# Patient Record
Sex: FEMALE | Race: WHITE | NOT HISPANIC OR LATINO | URBAN - METROPOLITAN AREA
[De-identification: names, ages, dates, MRNs, and addresses within clinical notes are randomized per-mention and may not be internally consistent; named-entity substitution may affect disease eponyms.]

---

## 2018-01-14 ENCOUNTER — EMERGENCY (EMERGENCY)
Facility: HOSPITAL | Age: 83
LOS: 1 days | Discharge: HOME | End: 2018-01-14
Attending: EMERGENCY MEDICINE | Admitting: EMERGENCY MEDICINE
Payer: MEDICARE

## 2018-01-14 VITALS
HEART RATE: 84 BPM | OXYGEN SATURATION: 97 % | TEMPERATURE: 98 F | DIASTOLIC BLOOD PRESSURE: 78 MMHG | RESPIRATION RATE: 19 BRPM | SYSTOLIC BLOOD PRESSURE: 175 MMHG

## 2018-01-14 VITALS
RESPIRATION RATE: 16 BRPM | DIASTOLIC BLOOD PRESSURE: 62 MMHG | SYSTOLIC BLOOD PRESSURE: 164 MMHG | HEART RATE: 78 BPM | OXYGEN SATURATION: 100 % | TEMPERATURE: 98 F

## 2018-01-14 DIAGNOSIS — Z90.49 ACQUIRED ABSENCE OF OTHER SPECIFIED PARTS OF DIGESTIVE TRACT: Chronic | ICD-10-CM

## 2018-01-14 LAB
ALBUMIN SERPL ELPH-MCNC: 4.1 G/DL — SIGNIFICANT CHANGE UP (ref 3.3–5)
ALP SERPL-CCNC: 73 U/L — SIGNIFICANT CHANGE UP (ref 40–120)
ALT FLD-CCNC: 23 U/L — SIGNIFICANT CHANGE UP (ref 4–33)
APPEARANCE UR: CLEAR — SIGNIFICANT CHANGE UP
AST SERPL-CCNC: 25 U/L — SIGNIFICANT CHANGE UP (ref 4–32)
BACTERIA # UR AUTO: SIGNIFICANT CHANGE UP
BASE EXCESS BLDV CALC-SCNC: 3.9 MMOL/L — SIGNIFICANT CHANGE UP
BASOPHILS # BLD AUTO: 0.04 K/UL — SIGNIFICANT CHANGE UP (ref 0–0.2)
BASOPHILS NFR BLD AUTO: 0.5 % — SIGNIFICANT CHANGE UP (ref 0–2)
BILIRUB SERPL-MCNC: 0.5 MG/DL — SIGNIFICANT CHANGE UP (ref 0.2–1.2)
BILIRUB UR-MCNC: NEGATIVE — SIGNIFICANT CHANGE UP
BLOOD GAS VENOUS - CREATININE: 0.64 MG/DL — SIGNIFICANT CHANGE UP (ref 0.5–1.3)
BLOOD UR QL VISUAL: NEGATIVE — SIGNIFICANT CHANGE UP
BUN SERPL-MCNC: 14 MG/DL — SIGNIFICANT CHANGE UP (ref 7–23)
CALCIUM SERPL-MCNC: 8.9 MG/DL — SIGNIFICANT CHANGE UP (ref 8.4–10.5)
CHLORIDE BLDV-SCNC: 97 MMOL/L — SIGNIFICANT CHANGE UP (ref 96–108)
CHLORIDE SERPL-SCNC: 95 MMOL/L — LOW (ref 98–107)
CK MB BLD-MCNC: 2.56 NG/ML — SIGNIFICANT CHANGE UP (ref 1–4.7)
CK MB BLD-MCNC: SIGNIFICANT CHANGE UP (ref 0–2.5)
CK SERPL-CCNC: 67 U/L — SIGNIFICANT CHANGE UP (ref 25–170)
CO2 SERPL-SCNC: 26 MMOL/L — SIGNIFICANT CHANGE UP (ref 22–31)
COLOR SPEC: SIGNIFICANT CHANGE UP
CREAT SERPL-MCNC: 0.66 MG/DL — SIGNIFICANT CHANGE UP (ref 0.5–1.3)
EOSINOPHIL # BLD AUTO: 0.09 K/UL — SIGNIFICANT CHANGE UP (ref 0–0.5)
EOSINOPHIL NFR BLD AUTO: 1.1 % — SIGNIFICANT CHANGE UP (ref 0–6)
GAS PNL BLDV: 128 MMOL/L — LOW (ref 136–146)
GLUCOSE BLDV-MCNC: 121 — HIGH (ref 70–99)
GLUCOSE SERPL-MCNC: 118 MG/DL — HIGH (ref 70–99)
GLUCOSE UR-MCNC: NEGATIVE — SIGNIFICANT CHANGE UP
HCO3 BLDV-SCNC: 27 MMOL/L — SIGNIFICANT CHANGE UP (ref 20–27)
HCT VFR BLD CALC: 37.2 % — SIGNIFICANT CHANGE UP (ref 34.5–45)
HCT VFR BLDV CALC: 41.1 % — SIGNIFICANT CHANGE UP (ref 34.5–45)
HGB BLD-MCNC: 13.1 G/DL — SIGNIFICANT CHANGE UP (ref 11.5–15.5)
HGB BLDV-MCNC: 13.4 G/DL — SIGNIFICANT CHANGE UP (ref 11.5–15.5)
IMM GRANULOCYTES # BLD AUTO: 0.04 # — SIGNIFICANT CHANGE UP
IMM GRANULOCYTES NFR BLD AUTO: 0.5 % — SIGNIFICANT CHANGE UP (ref 0–1.5)
KETONES UR-MCNC: NEGATIVE — SIGNIFICANT CHANGE UP
LACTATE BLDV-MCNC: 1 MMOL/L — SIGNIFICANT CHANGE UP (ref 0.5–2)
LEUKOCYTE ESTERASE UR-ACNC: HIGH
LYMPHOCYTES # BLD AUTO: 1.78 K/UL — SIGNIFICANT CHANGE UP (ref 1–3.3)
LYMPHOCYTES # BLD AUTO: 21.9 % — SIGNIFICANT CHANGE UP (ref 13–44)
MCHC RBC-ENTMCNC: 31 PG — SIGNIFICANT CHANGE UP (ref 27–34)
MCHC RBC-ENTMCNC: 35.2 % — SIGNIFICANT CHANGE UP (ref 32–36)
MCV RBC AUTO: 87.9 FL — SIGNIFICANT CHANGE UP (ref 80–100)
MONOCYTES # BLD AUTO: 0.57 K/UL — SIGNIFICANT CHANGE UP (ref 0–0.9)
MONOCYTES NFR BLD AUTO: 7 % — SIGNIFICANT CHANGE UP (ref 2–14)
NEUTROPHILS # BLD AUTO: 5.62 K/UL — SIGNIFICANT CHANGE UP (ref 1.8–7.4)
NEUTROPHILS NFR BLD AUTO: 69 % — SIGNIFICANT CHANGE UP (ref 43–77)
NITRITE UR-MCNC: NEGATIVE — SIGNIFICANT CHANGE UP
NRBC # FLD: 0 — SIGNIFICANT CHANGE UP
PCO2 BLDV: 46 MMHG — SIGNIFICANT CHANGE UP (ref 41–51)
PH BLDV: 7.41 PH — SIGNIFICANT CHANGE UP (ref 7.32–7.43)
PH UR: 7 — SIGNIFICANT CHANGE UP (ref 4.6–8)
PLATELET # BLD AUTO: 315 K/UL — SIGNIFICANT CHANGE UP (ref 150–400)
PMV BLD: 8.7 FL — SIGNIFICANT CHANGE UP (ref 7–13)
PO2 BLDV: 54 MMHG — HIGH (ref 35–40)
POTASSIUM BLDV-SCNC: 3.7 MMOL/L — SIGNIFICANT CHANGE UP (ref 3.4–4.5)
POTASSIUM SERPL-MCNC: 4.1 MMOL/L — SIGNIFICANT CHANGE UP (ref 3.5–5.3)
POTASSIUM SERPL-SCNC: 4.1 MMOL/L — SIGNIFICANT CHANGE UP (ref 3.5–5.3)
PROT SERPL-MCNC: 7 G/DL — SIGNIFICANT CHANGE UP (ref 6–8.3)
PROT UR-MCNC: NEGATIVE MG/DL — SIGNIFICANT CHANGE UP
RBC # BLD: 4.23 M/UL — SIGNIFICANT CHANGE UP (ref 3.8–5.2)
RBC # FLD: 12.4 % — SIGNIFICANT CHANGE UP (ref 10.3–14.5)
RBC CASTS # UR COMP ASSIST: SIGNIFICANT CHANGE UP (ref 0–?)
SAO2 % BLDV: 87 % — HIGH (ref 60–85)
SODIUM SERPL-SCNC: 135 MMOL/L — SIGNIFICANT CHANGE UP (ref 135–145)
SP GR SPEC: 1 — LOW (ref 1–1.04)
SQUAMOUS # UR AUTO: SIGNIFICANT CHANGE UP
TROPONIN T SERPL-MCNC: < 0.06 NG/ML — SIGNIFICANT CHANGE UP (ref 0–0.06)
UROBILINOGEN FLD QL: NORMAL MG/DL — SIGNIFICANT CHANGE UP
WBC # BLD: 8.14 K/UL — SIGNIFICANT CHANGE UP (ref 3.8–10.5)
WBC # FLD AUTO: 8.14 K/UL — SIGNIFICANT CHANGE UP (ref 3.8–10.5)
WBC UR QL: SIGNIFICANT CHANGE UP (ref 0–?)

## 2018-01-14 PROCEDURE — 71046 X-RAY EXAM CHEST 2 VIEWS: CPT | Mod: 26

## 2018-01-14 PROCEDURE — 99285 EMERGENCY DEPT VISIT HI MDM: CPT | Mod: GC

## 2018-01-14 RX ORDER — SODIUM CHLORIDE 9 MG/ML
500 INJECTION INTRAMUSCULAR; INTRAVENOUS; SUBCUTANEOUS ONCE
Qty: 0 | Refills: 0 | Status: COMPLETED | OUTPATIENT
Start: 2018-01-14 | End: 2018-01-14

## 2018-01-14 RX ORDER — SODIUM CHLORIDE 9 MG/ML
1000 INJECTION INTRAMUSCULAR; INTRAVENOUS; SUBCUTANEOUS ONCE
Qty: 0 | Refills: 0 | Status: COMPLETED | OUTPATIENT
Start: 2018-01-14 | End: 2018-01-14

## 2018-01-14 RX ADMIN — SODIUM CHLORIDE 1000 MILLILITER(S): 9 INJECTION INTRAMUSCULAR; INTRAVENOUS; SUBCUTANEOUS at 18:49

## 2018-01-14 RX ADMIN — SODIUM CHLORIDE 500 MILLILITER(S): 9 INJECTION INTRAMUSCULAR; INTRAVENOUS; SUBCUTANEOUS at 17:25

## 2018-01-14 NOTE — ED PROVIDER NOTE - CARE PLAN
Instructions for follow-up, activity and diet:	Follow-up with your Primary Care Physician within 24-48 hours.  Please return to the Emergency Department immediately for any new, worsening or concerning symptoms; specifically those included in the attached information brochure. Principal Discharge DX:	URI (upper respiratory infection)  Instructions for follow-up, activity and diet:	Follow-up with your Primary Care Physician within 24-48 hours.  Please return to the Emergency Department immediately for any new, worsening or concerning symptoms; specifically those included in the attached information brochure. Principal Discharge DX:	URI (upper respiratory infection)  Assessment and plan of treatment:	Follow-up with your Primary Care Physician within 24-48 hours.  Please return to the Emergency Department immediately for any new, worsening or concerning symptoms; specifically those included in the attached information brochure.

## 2018-01-14 NOTE — ED ADULT NURSE NOTE - OBJECTIVE STATEMENT
81 y/o female presents to ED with c/o URI symptoms.  Pt states that she has had nasal congestion, non productive cough and generalized weakness since Tuesday.  Pt states that she went to see her PMD on Thursday and was given antibiotics, not feeling better.  Pt states that she feels weaker today.  Pt denies fever or chills, no n/v/d, denies SOB, no CP, no abd pain.  Pt states that she has been taking PO fluids, has not taken any Tylenol or Motrin.  Pt awake A&Ox3, ambulatory with steady gait, IV established, labs drawn and sent, providers evaluating.

## 2018-01-14 NOTE — ED PROVIDER NOTE - OBJECTIVE STATEMENT
82 year-old female with history of Hypertension presents to the Emergency Department for weakness x 1 day.  Patient mentions that he has been having URI symptoms of cough, sore throat and sinus congestion ongoing for the past 6 days.  Reports generalized weakness going on since this morning since AM.  No fevers, chills, nausea, vomiting.  Has SOB ongoing during this time as well - exacerbated with exertion.  On/off CP for years - not worsened lately - got a echo last year which did not show acute abnormalities.  Saw her PCP 3 days ago and had a CXR done - results not back yet - and given Levofloxacin.

## 2018-01-14 NOTE — ED PROVIDER NOTE - ATTENDING CONTRIBUTION TO CARE
romel: URI symptoms including cough, sore throat and sinus congestion approx one week. rx levoflox by PCP several days ago. CXR done, results?? No in addition c/o weakness.   exam unremarkable.   Impression: need to consider infection including viral. CXR nl .UA 10-25 wbcs. hydrated while in ED. Pt felt well enough to go home and recc opt f/u.   Continue levo for ua fidnings (while no sx of UTI)

## 2018-01-14 NOTE — ED PROVIDER NOTE - MEDICAL DECISION MAKING DETAILS
82 year-old female with history of Hypertension presents to the Emergency Department for weakness x 1 day ~ likely dehydration and malaise from recent illness.  Viral vs PNA.  Plan to do a CXR and EKG.  Basic labs with CBC, CMP.  Ongoing CP for years - not changing - unlikely to be due to ACS; plan to do troponin.

## 2018-01-15 ENCOUNTER — EMERGENCY (EMERGENCY)
Facility: HOSPITAL | Age: 83
LOS: 1 days | Discharge: HOME | End: 2018-01-15
Attending: EMERGENCY MEDICINE | Admitting: EMERGENCY MEDICINE
Payer: MEDICARE

## 2018-01-15 VITALS
DIASTOLIC BLOOD PRESSURE: 81 MMHG | TEMPERATURE: 98 F | SYSTOLIC BLOOD PRESSURE: 173 MMHG | HEART RATE: 87 BPM | RESPIRATION RATE: 20 BRPM | OXYGEN SATURATION: 99 %

## 2018-01-15 VITALS
HEART RATE: 75 BPM | RESPIRATION RATE: 16 BRPM | DIASTOLIC BLOOD PRESSURE: 77 MMHG | SYSTOLIC BLOOD PRESSURE: 134 MMHG | TEMPERATURE: 98 F | OXYGEN SATURATION: 99 %

## 2018-01-15 DIAGNOSIS — Z90.49 ACQUIRED ABSENCE OF OTHER SPECIFIED PARTS OF DIGESTIVE TRACT: Chronic | ICD-10-CM

## 2018-01-15 LAB
ALBUMIN SERPL ELPH-MCNC: 3.8 G/DL — SIGNIFICANT CHANGE UP (ref 3.3–5)
ALP SERPL-CCNC: 73 U/L — SIGNIFICANT CHANGE UP (ref 40–120)
ALT FLD-CCNC: 25 U/L — SIGNIFICANT CHANGE UP (ref 4–33)
APPEARANCE UR: CLEAR — SIGNIFICANT CHANGE UP
APTT BLD: 27.4 SEC — LOW (ref 27.5–37.4)
AST SERPL-CCNC: 26 U/L — SIGNIFICANT CHANGE UP (ref 4–32)
BASE EXCESS BLDV CALC-SCNC: 1.9 MMOL/L — SIGNIFICANT CHANGE UP
BILIRUB SERPL-MCNC: 0.7 MG/DL — SIGNIFICANT CHANGE UP (ref 0.2–1.2)
BILIRUB UR-MCNC: NEGATIVE — SIGNIFICANT CHANGE UP
BLOOD GAS VENOUS - CREATININE: 0.57 MG/DL — SIGNIFICANT CHANGE UP (ref 0.5–1.3)
BLOOD UR QL VISUAL: NEGATIVE — SIGNIFICANT CHANGE UP
BUN SERPL-MCNC: 7 MG/DL — SIGNIFICANT CHANGE UP (ref 7–23)
BUN SERPL-MCNC: 9 MG/DL — SIGNIFICANT CHANGE UP (ref 7–23)
CALCIUM SERPL-MCNC: 8 MG/DL — LOW (ref 8.4–10.5)
CALCIUM SERPL-MCNC: 8.6 MG/DL — SIGNIFICANT CHANGE UP (ref 8.4–10.5)
CHLORIDE BLDV-SCNC: 92 MMOL/L — LOW (ref 96–108)
CHLORIDE SERPL-SCNC: 104 MMOL/L — SIGNIFICANT CHANGE UP (ref 98–107)
CHLORIDE SERPL-SCNC: 89 MMOL/L — LOW (ref 98–107)
CO2 SERPL-SCNC: 22 MMOL/L — SIGNIFICANT CHANGE UP (ref 22–31)
CO2 SERPL-SCNC: 24 MMOL/L — SIGNIFICANT CHANGE UP (ref 22–31)
COLOR SPEC: SIGNIFICANT CHANGE UP
CREAT SERPL-MCNC: 0.54 MG/DL — SIGNIFICANT CHANGE UP (ref 0.5–1.3)
CREAT SERPL-MCNC: 0.6 MG/DL — SIGNIFICANT CHANGE UP (ref 0.5–1.3)
GAS PNL BLDV: 121 MMOL/L — LOW (ref 136–146)
GLUCOSE BLDV-MCNC: 105 — HIGH (ref 70–99)
GLUCOSE SERPL-MCNC: 105 MG/DL — HIGH (ref 70–99)
GLUCOSE SERPL-MCNC: 113 MG/DL — HIGH (ref 70–99)
GLUCOSE UR-MCNC: NEGATIVE — SIGNIFICANT CHANGE UP
HCO3 BLDV-SCNC: 26 MMOL/L — SIGNIFICANT CHANGE UP (ref 20–27)
HCT VFR BLD CALC: 35.9 % — SIGNIFICANT CHANGE UP (ref 34.5–45)
HCT VFR BLDV CALC: 40.2 % — SIGNIFICANT CHANGE UP (ref 34.5–45)
HGB BLD-MCNC: 13.2 G/DL — SIGNIFICANT CHANGE UP (ref 11.5–15.5)
HGB BLDV-MCNC: 13.1 G/DL — SIGNIFICANT CHANGE UP (ref 11.5–15.5)
INR BLD: 1.03 — SIGNIFICANT CHANGE UP (ref 0.88–1.17)
KETONES UR-MCNC: NEGATIVE — SIGNIFICANT CHANGE UP
LACTATE BLDV-MCNC: 0.9 MMOL/L — SIGNIFICANT CHANGE UP (ref 0.5–2)
LEUKOCYTE ESTERASE UR-ACNC: NEGATIVE — SIGNIFICANT CHANGE UP
MCHC RBC-ENTMCNC: 32 PG — SIGNIFICANT CHANGE UP (ref 27–34)
MCHC RBC-ENTMCNC: 36.8 % — HIGH (ref 32–36)
MCV RBC AUTO: 86.9 FL — SIGNIFICANT CHANGE UP (ref 80–100)
NITRITE UR-MCNC: NEGATIVE — SIGNIFICANT CHANGE UP
NRBC # FLD: 0 — SIGNIFICANT CHANGE UP
PCO2 BLDV: 41 MMHG — SIGNIFICANT CHANGE UP (ref 41–51)
PH BLDV: 7.42 PH — SIGNIFICANT CHANGE UP (ref 7.32–7.43)
PH UR: 7 — SIGNIFICANT CHANGE UP (ref 4.6–8)
PLATELET # BLD AUTO: 283 K/UL — SIGNIFICANT CHANGE UP (ref 150–400)
PMV BLD: 8.5 FL — SIGNIFICANT CHANGE UP (ref 7–13)
PO2 BLDV: 45 MMHG — HIGH (ref 35–40)
POTASSIUM BLDV-SCNC: 3.3 MMOL/L — LOW (ref 3.4–4.5)
POTASSIUM SERPL-MCNC: 3.6 MMOL/L — SIGNIFICANT CHANGE UP (ref 3.5–5.3)
POTASSIUM SERPL-MCNC: 3.8 MMOL/L — SIGNIFICANT CHANGE UP (ref 3.5–5.3)
POTASSIUM SERPL-SCNC: 3.6 MMOL/L — SIGNIFICANT CHANGE UP (ref 3.5–5.3)
POTASSIUM SERPL-SCNC: 3.8 MMOL/L — SIGNIFICANT CHANGE UP (ref 3.5–5.3)
PROT SERPL-MCNC: 6.8 G/DL — SIGNIFICANT CHANGE UP (ref 6–8.3)
PROT UR-MCNC: NEGATIVE MG/DL — SIGNIFICANT CHANGE UP
PROTHROM AB SERPL-ACNC: 11.9 SEC — SIGNIFICANT CHANGE UP (ref 9.8–13.1)
RBC # BLD: 4.13 M/UL — SIGNIFICANT CHANGE UP (ref 3.8–5.2)
RBC # FLD: 12.1 % — SIGNIFICANT CHANGE UP (ref 10.3–14.5)
RBC CASTS # UR COMP ASSIST: SIGNIFICANT CHANGE UP (ref 0–?)
SAO2 % BLDV: 81.3 % — SIGNIFICANT CHANGE UP (ref 60–85)
SODIUM SERPL-SCNC: 126 MMOL/L — LOW (ref 135–145)
SODIUM SERPL-SCNC: 140 MMOL/L — SIGNIFICANT CHANGE UP (ref 135–145)
SP GR SPEC: 1 — LOW (ref 1–1.04)
SQUAMOUS # UR AUTO: SIGNIFICANT CHANGE UP
UROBILINOGEN FLD QL: NORMAL MG/DL — SIGNIFICANT CHANGE UP
WBC # BLD: 9.89 K/UL — SIGNIFICANT CHANGE UP (ref 3.8–10.5)
WBC # FLD AUTO: 9.89 K/UL — SIGNIFICANT CHANGE UP (ref 3.8–10.5)
WBC UR QL: SIGNIFICANT CHANGE UP (ref 0–?)

## 2018-01-15 PROCEDURE — 99285 EMERGENCY DEPT VISIT HI MDM: CPT | Mod: GC

## 2018-01-15 PROCEDURE — 70450 CT HEAD/BRAIN W/O DYE: CPT | Mod: 26

## 2018-01-15 RX ORDER — SODIUM CHLORIDE 9 MG/ML
1000 INJECTION INTRAMUSCULAR; INTRAVENOUS; SUBCUTANEOUS ONCE
Qty: 0 | Refills: 0 | Status: COMPLETED | OUTPATIENT
Start: 2018-01-15 | End: 2018-01-15

## 2018-01-15 RX ORDER — CEFTRIAXONE 500 MG/1
1 INJECTION, POWDER, FOR SOLUTION INTRAMUSCULAR; INTRAVENOUS ONCE
Qty: 0 | Refills: 0 | Status: COMPLETED | OUTPATIENT
Start: 2018-01-15 | End: 2018-01-15

## 2018-01-15 RX ORDER — CEPHALEXIN 500 MG
1 CAPSULE ORAL
Qty: 14 | Refills: 0
Start: 2018-01-15 | End: 2018-01-21

## 2018-01-15 RX ORDER — MECLIZINE HCL 12.5 MG
25 TABLET ORAL ONCE
Qty: 0 | Refills: 0 | Status: COMPLETED | OUTPATIENT
Start: 2018-01-15 | End: 2018-01-15

## 2018-01-15 RX ADMIN — CEFTRIAXONE 100 GRAM(S): 500 INJECTION, POWDER, FOR SOLUTION INTRAMUSCULAR; INTRAVENOUS at 14:11

## 2018-01-15 RX ADMIN — Medication 25 MILLIGRAM(S): at 12:37

## 2018-01-15 RX ADMIN — SODIUM CHLORIDE 1000 MILLILITER(S): 9 INJECTION INTRAMUSCULAR; INTRAVENOUS; SUBCUTANEOUS at 12:32

## 2018-01-15 RX ADMIN — SODIUM CHLORIDE 1000 MILLILITER(S): 9 INJECTION INTRAMUSCULAR; INTRAVENOUS; SUBCUTANEOUS at 16:30

## 2018-01-15 NOTE — ED PROVIDER NOTE - ATTENDING CONTRIBUTION TO CARE
Chelly: 81 yo female seen yesterday in the ED for URI symptoms. Pt had a UTI but had already been on levaquin and was d/c home after IV hydration. Pt initially felt better but this morning woke up again c/o generalized weakness and was brought back  to the hospital by her son. Pt denies fevers, chills, abdominal pain, vomiting, chest pain and SOB. Pt c/o headache and unsteady gait. Exam: CNII-XII intact, AxOx4, 5/5 strength x 4 extremities. No dysmetria or disdiadochokinesia, no ataxia. pt mabulated multiple times to the bathroom observed by me without assistance with normal gait. cardiac, lung, and abdominal exam unremarkable. MMM, pink conjunctiva. A/P- 81 yo female with UTI/URI and headache. will obtain labs, u/a, cxr and ct head. will give iv hydration and reassess.

## 2018-01-15 NOTE — ED ADULT NURSE NOTE - OBJECTIVE STATEMENT
p/t is 82y old female,  received awake and responsive c/o of mild headaches, dizziness, and weakness for past few days, p/t ambulatory, denies any chest pain denies sob,  nad noted

## 2018-01-15 NOTE — ED ADULT TRIAGE NOTE - CHIEF COMPLAINT QUOTE
pt returning for persistent eladio. ring in ears and dizziness with HA and interm.  blur vision started this AM, denies weakness to upper and lower extremities pt AOX 3 no facial droop.

## 2018-01-15 NOTE — ED PROVIDER NOTE - SHIFT CHANGE DETAILS
I have signed over this patient to the above attending physician. Pertinent history, physical exam findings and workup thus far in the ED have been discussed. The pending tests and plan, including Ct head were signed over.  All questions from the above attending physician have been answered.

## 2018-01-15 NOTE — ED PROVIDER NOTE - MEDICAL DECISION MAKING DETAILS
81yo female with generalized weakness in setting of URI, now with dizziness/room spinning and ringing in ears, likely peripheral vertigo related to congestion, normal neuro exam (will assess gait after fluids), will hydrate, meclizine, CT head, reassess, if unable to walk will have neuro eval and admit 83yo female with generalized weakness in setting of URI, now with dizziness/room spinning and ringing in ears, likely peripheral vertigo related to congestion, normal neuro exam, ambulating here without difficulty, will hydrate, meclizine, CT head, reassess, Also with UTI on UA yesterday and still with urinary frequency while on levaquin, will switch to ceftriaxone

## 2018-01-15 NOTE — ED PROVIDER NOTE - OBJECTIVE STATEMENT
81yo female h/o htn and chronic bronchitis, seen in ED yesterday for generalized weakness in setting of URI currently on levaquin. Pt was evaluated in ED yesterday, neg chest xray and labs wnl, improved after fluids, now returns for worsening generalized weakness as well as dizziness and unsteady gait. Pt feels both weakness and room spinning when trying to walk. Also had headache and ear ringing earlier in day. Currently no symptoms at rest. No extremity weakness. Pt had episode of seeing dark but since resolved and no current vision changes. Pt has chronic cough, no worsening, no fevers, no chills. + chronic decreased appetitite, no nausea, no vomiting.

## 2018-01-15 NOTE — ED PROVIDER NOTE - PROGRESS NOTE DETAILS
[place]: Pt ambulating back and forth to bathroom without difficulty. Treated with ceftriaxone for possisble UTI from yesterdays positive urine and urinary frequency despite negative UA today as pt was on levaquin. Awaiting CT head. [place]: pt improved, awaiting Ct head read, if negative will dc with keflex and close pmd follow up RPT BMP improved, pt eating, ambulating, feels well for dc

## 2018-01-15 NOTE — ED PROVIDER NOTE - ENMT, MLM
Airway patent, Nasal mucosa clear. Mouth with DRY mucosa. Throat has no vesicles, no oropharyngeal exudates and uvula is midline. Left TM normal, right TM with cerumen

## 2018-01-16 LAB — SPECIMEN SOURCE: SIGNIFICANT CHANGE UP

## 2018-01-17 LAB — BACTERIA UR CULT: SIGNIFICANT CHANGE UP

## 2018-07-24 ENCOUNTER — EMERGENCY (EMERGENCY)
Facility: HOSPITAL | Age: 83
LOS: 1 days | Discharge: HOME | End: 2018-07-24
Attending: EMERGENCY MEDICINE | Admitting: EMERGENCY MEDICINE
Payer: MEDICARE

## 2018-07-24 VITALS
HEART RATE: 80 BPM | OXYGEN SATURATION: 99 % | TEMPERATURE: 98 F | DIASTOLIC BLOOD PRESSURE: 90 MMHG | SYSTOLIC BLOOD PRESSURE: 197 MMHG | RESPIRATION RATE: 16 BRPM

## 2018-07-24 DIAGNOSIS — Z90.49 ACQUIRED ABSENCE OF OTHER SPECIFIED PARTS OF DIGESTIVE TRACT: Chronic | ICD-10-CM

## 2018-07-24 LAB
ALBUMIN SERPL ELPH-MCNC: 4.1 G/DL — SIGNIFICANT CHANGE UP (ref 3.3–5)
ALP SERPL-CCNC: 84 U/L — SIGNIFICANT CHANGE UP (ref 40–120)
ALT FLD-CCNC: 29 U/L — SIGNIFICANT CHANGE UP (ref 4–33)
APPEARANCE UR: CLEAR — SIGNIFICANT CHANGE UP
APTT BLD: 28.9 SEC — SIGNIFICANT CHANGE UP (ref 27.5–37.4)
AST SERPL-CCNC: 40 U/L — HIGH (ref 4–32)
BASOPHILS # BLD AUTO: 0.04 K/UL — SIGNIFICANT CHANGE UP (ref 0–0.2)
BASOPHILS NFR BLD AUTO: 0.4 % — SIGNIFICANT CHANGE UP (ref 0–2)
BILIRUB SERPL-MCNC: 0.5 MG/DL — SIGNIFICANT CHANGE UP (ref 0.2–1.2)
BILIRUB UR-MCNC: NEGATIVE — SIGNIFICANT CHANGE UP
BLOOD UR QL VISUAL: HIGH
BUN SERPL-MCNC: 11 MG/DL — SIGNIFICANT CHANGE UP (ref 7–23)
CALCIUM SERPL-MCNC: 8.8 MG/DL — SIGNIFICANT CHANGE UP (ref 8.4–10.5)
CHLORIDE SERPL-SCNC: 91 MMOL/L — LOW (ref 98–107)
CO2 SERPL-SCNC: 24 MMOL/L — SIGNIFICANT CHANGE UP (ref 22–31)
COLOR SPEC: SIGNIFICANT CHANGE UP
CREAT SERPL-MCNC: 0.59 MG/DL — SIGNIFICANT CHANGE UP (ref 0.5–1.3)
EOSINOPHIL # BLD AUTO: 0.03 K/UL — SIGNIFICANT CHANGE UP (ref 0–0.5)
EOSINOPHIL NFR BLD AUTO: 0.3 % — SIGNIFICANT CHANGE UP (ref 0–6)
GLUCOSE SERPL-MCNC: 230 MG/DL — HIGH (ref 70–99)
GLUCOSE UR-MCNC: 300 — SIGNIFICANT CHANGE UP
HBA1C BLD-MCNC: 5.7 % — HIGH (ref 4–5.6)
HCT VFR BLD CALC: 38.5 % — SIGNIFICANT CHANGE UP (ref 34.5–45)
HGB BLD-MCNC: 13.5 G/DL — SIGNIFICANT CHANGE UP (ref 11.5–15.5)
IMM GRANULOCYTES # BLD AUTO: 0.06 # — SIGNIFICANT CHANGE UP
IMM GRANULOCYTES NFR BLD AUTO: 0.6 % — SIGNIFICANT CHANGE UP (ref 0–1.5)
INR BLD: 1 — SIGNIFICANT CHANGE UP (ref 0.88–1.17)
KETONES UR-MCNC: NEGATIVE — SIGNIFICANT CHANGE UP
LEUKOCYTE ESTERASE UR-ACNC: NEGATIVE — SIGNIFICANT CHANGE UP
LYMPHOCYTES # BLD AUTO: 0.98 K/UL — LOW (ref 1–3.3)
LYMPHOCYTES # BLD AUTO: 10.4 % — LOW (ref 13–44)
MCHC RBC-ENTMCNC: 30.5 PG — SIGNIFICANT CHANGE UP (ref 27–34)
MCHC RBC-ENTMCNC: 35.1 % — SIGNIFICANT CHANGE UP (ref 32–36)
MCV RBC AUTO: 87.1 FL — SIGNIFICANT CHANGE UP (ref 80–100)
MONOCYTES # BLD AUTO: 0.55 K/UL — SIGNIFICANT CHANGE UP (ref 0–0.9)
MONOCYTES NFR BLD AUTO: 5.8 % — SIGNIFICANT CHANGE UP (ref 2–14)
MUCOUS THREADS # UR AUTO: SIGNIFICANT CHANGE UP
NEUTROPHILS # BLD AUTO: 7.78 K/UL — HIGH (ref 1.8–7.4)
NEUTROPHILS NFR BLD AUTO: 82.5 % — HIGH (ref 43–77)
NITRITE UR-MCNC: NEGATIVE — SIGNIFICANT CHANGE UP
NRBC # FLD: 0 — SIGNIFICANT CHANGE UP
PH UR: 7 — SIGNIFICANT CHANGE UP (ref 4.6–8)
PLATELET # BLD AUTO: 277 K/UL — SIGNIFICANT CHANGE UP (ref 150–400)
PMV BLD: 9.3 FL — SIGNIFICANT CHANGE UP (ref 7–13)
POTASSIUM SERPL-MCNC: 3.6 MMOL/L — SIGNIFICANT CHANGE UP (ref 3.5–5.3)
POTASSIUM SERPL-SCNC: 3.6 MMOL/L — SIGNIFICANT CHANGE UP (ref 3.5–5.3)
PROT SERPL-MCNC: 7.4 G/DL — SIGNIFICANT CHANGE UP (ref 6–8.3)
PROT UR-MCNC: NEGATIVE MG/DL — SIGNIFICANT CHANGE UP
PROTHROM AB SERPL-ACNC: 11.1 SEC — SIGNIFICANT CHANGE UP (ref 9.8–13.1)
RBC # BLD: 4.42 M/UL — SIGNIFICANT CHANGE UP (ref 3.8–5.2)
RBC # FLD: 12.6 % — SIGNIFICANT CHANGE UP (ref 10.3–14.5)
RBC CASTS # UR COMP ASSIST: SIGNIFICANT CHANGE UP (ref 0–?)
SODIUM SERPL-SCNC: 129 MMOL/L — LOW (ref 135–145)
SP GR SPEC: 1 — LOW (ref 1–1.04)
TROPONIN T, HIGH SENSITIVITY: 12 NG/L — SIGNIFICANT CHANGE UP (ref ?–14)
TROPONIN T, HIGH SENSITIVITY: 12 NG/L — SIGNIFICANT CHANGE UP (ref ?–14)
UROBILINOGEN FLD QL: NORMAL MG/DL — SIGNIFICANT CHANGE UP
WBC # BLD: 9.44 K/UL — SIGNIFICANT CHANGE UP (ref 3.8–10.5)
WBC # FLD AUTO: 9.44 K/UL — SIGNIFICANT CHANGE UP (ref 3.8–10.5)
WBC UR QL: SIGNIFICANT CHANGE UP (ref 0–?)

## 2018-07-24 PROCEDURE — 71046 X-RAY EXAM CHEST 2 VIEWS: CPT | Mod: 26

## 2018-07-24 PROCEDURE — 73030 X-RAY EXAM OF SHOULDER: CPT | Mod: 26,LT

## 2018-07-24 PROCEDURE — 99219: CPT | Mod: GC

## 2018-07-24 PROCEDURE — 72125 CT NECK SPINE W/O DYE: CPT | Mod: 26

## 2018-07-24 PROCEDURE — 70450 CT HEAD/BRAIN W/O DYE: CPT | Mod: 26

## 2018-07-24 PROCEDURE — 93010 ELECTROCARDIOGRAM REPORT: CPT | Mod: 59,GC

## 2018-07-24 PROCEDURE — 93306 TTE W/DOPPLER COMPLETE: CPT | Mod: 26

## 2018-07-24 RX ORDER — ZOLPIDEM TARTRATE 10 MG/1
5 TABLET ORAL AT BEDTIME
Qty: 0 | Refills: 0 | Status: DISCONTINUED | OUTPATIENT
Start: 2018-07-24 | End: 2018-07-24

## 2018-07-24 RX ORDER — SODIUM CHLORIDE 9 MG/ML
500 INJECTION INTRAMUSCULAR; INTRAVENOUS; SUBCUTANEOUS ONCE
Qty: 0 | Refills: 0 | Status: COMPLETED | OUTPATIENT
Start: 2018-07-24 | End: 2018-07-24

## 2018-07-24 RX ORDER — TRAZODONE HCL 50 MG
50 TABLET ORAL AT BEDTIME
Qty: 0 | Refills: 0 | Status: DISCONTINUED | OUTPATIENT
Start: 2018-07-24 | End: 2018-07-28

## 2018-07-24 RX ORDER — ACETAMINOPHEN 500 MG
650 TABLET ORAL ONCE
Qty: 0 | Refills: 0 | Status: COMPLETED | OUTPATIENT
Start: 2018-07-24 | End: 2018-07-24

## 2018-07-24 RX ORDER — AMLODIPINE BESYLATE 2.5 MG/1
5 TABLET ORAL DAILY
Qty: 0 | Refills: 0 | Status: DISCONTINUED | OUTPATIENT
Start: 2018-07-24 | End: 2018-07-28

## 2018-07-24 RX ORDER — LISINOPRIL 2.5 MG/1
10 TABLET ORAL DAILY
Qty: 0 | Refills: 0 | Status: DISCONTINUED | OUTPATIENT
Start: 2018-07-24 | End: 2018-07-28

## 2018-07-24 RX ORDER — TETANUS TOXOID, REDUCED DIPHTHERIA TOXOID AND ACELLULAR PERTUSSIS VACCINE, ADSORBED 5; 2.5; 8; 8; 2.5 [IU]/.5ML; [IU]/.5ML; UG/.5ML; UG/.5ML; UG/.5ML
0.5 SUSPENSION INTRAMUSCULAR ONCE
Qty: 0 | Refills: 0 | Status: COMPLETED | OUTPATIENT
Start: 2018-07-24 | End: 2018-07-24

## 2018-07-24 RX ORDER — SERTRALINE 25 MG/1
100 TABLET, FILM COATED ORAL DAILY
Qty: 0 | Refills: 0 | Status: DISCONTINUED | OUTPATIENT
Start: 2018-07-24 | End: 2018-07-28

## 2018-07-24 RX ORDER — IPRATROPIUM/ALBUTEROL SULFATE 18-103MCG
3 AEROSOL WITH ADAPTER (GRAM) INHALATION ONCE
Qty: 0 | Refills: 0 | Status: COMPLETED | OUTPATIENT
Start: 2018-07-24 | End: 2018-07-24

## 2018-07-24 RX ORDER — METOPROLOL TARTRATE 50 MG
100 TABLET ORAL DAILY
Qty: 0 | Refills: 0 | Status: DISCONTINUED | OUTPATIENT
Start: 2018-07-24 | End: 2018-07-28

## 2018-07-24 RX ADMIN — Medication 3 MILLILITER(S): at 11:06

## 2018-07-24 RX ADMIN — Medication 650 MILLIGRAM(S): at 12:05

## 2018-07-24 RX ADMIN — Medication 650 MILLIGRAM(S): at 10:23

## 2018-07-24 RX ADMIN — TETANUS TOXOID, REDUCED DIPHTHERIA TOXOID AND ACELLULAR PERTUSSIS VACCINE, ADSORBED 0.5 MILLILITER(S): 5; 2.5; 8; 8; 2.5 SUSPENSION INTRAMUSCULAR at 16:59

## 2018-07-24 RX ADMIN — SODIUM CHLORIDE 1000 MILLILITER(S): 9 INJECTION INTRAMUSCULAR; INTRAVENOUS; SUBCUTANEOUS at 10:23

## 2018-07-24 RX ADMIN — SODIUM CHLORIDE 500 MILLILITER(S): 9 INJECTION INTRAMUSCULAR; INTRAVENOUS; SUBCUTANEOUS at 10:55

## 2018-07-24 RX ADMIN — Medication 3 MILLILITER(S): at 10:23

## 2018-07-24 RX ADMIN — SODIUM CHLORIDE 500 MILLILITER(S): 9 INJECTION INTRAMUSCULAR; INTRAVENOUS; SUBCUTANEOUS at 19:22

## 2018-07-24 RX ADMIN — SODIUM CHLORIDE 500 MILLILITER(S): 9 INJECTION INTRAMUSCULAR; INTRAVENOUS; SUBCUTANEOUS at 18:11

## 2018-07-24 RX ADMIN — SERTRALINE 100 MILLIGRAM(S): 25 TABLET, FILM COATED ORAL at 22:24

## 2018-07-24 RX ADMIN — Medication 50 MILLIGRAM(S): at 22:24

## 2018-07-24 NOTE — ED PROVIDER NOTE - MUSCULOSKELETAL, MLM
Spine appears normal, range of motion is not limited, no muscle or joint tenderness, No hip tenderness, no pain on axial loading, no midline spinal tenderness, FROM of all four extremities

## 2018-07-24 NOTE — SOCIAL WORK INITIAL EVALUATION ADULT - PLAN
Spoke with patient and family at bedside in ED.  Patient is a very pleasant 82 year old, Baptism, , white female.  She is alert and oriented x4, independent with all ADL's.  Patient declined need for services or community resources at this time.  Patient reports she is managing well and has good family support.

## 2018-07-24 NOTE — ED PROVIDER NOTE - CARE PLAN
Principal Discharge DX:	Syncope, unspecified syncope type  Secondary Diagnosis:	Bronchiectasis without complication

## 2018-07-24 NOTE — ED CDU PROVIDER INITIAL DAY NOTE - OBJECTIVE STATEMENT
82 F hx HTN, bronchiectasis, depression, comes to the ED sp a fall down 4 steps occurring approx 12 pm yesterday while standing from seated position. Fall was unwitnessed with trauma noted to left temporal area, left shoulder and right forearm. Found  by mail man. Was able  to ambulate indoors without difficulty, maintaining ADLs since incident. No reported incontinence, tongue biting, chest pain, palpitations, shortness of breath.  However patient cannot recall complete preceding event. No recent fevers/chills/ new cough/ new sob, sensation/.motor changes to the extremities. Notes generalized weakness and intermittent dizziness that is chronic- no dysuria/ n/v/d, travel or sick contacts, no new medication changes.  CT head and neck okay, Pt sent to CDU for tele monitoring and echo.       Home meds include amlodipine, benazepril, xanax .5mg, and metoprolol.

## 2018-07-24 NOTE — ED CDU PROVIDER INITIAL DAY NOTE - ATTENDING CONTRIBUTION TO CARE
I, Jennifer Cabot, MD, have performed a history and physical exam of the patient and discussed their management with the ACP.  I reviewed the PA's note and agree with the documented findings and plan of care. My medical decision making and observations are found above.    Cabot: 82F with PMH of HTN, bronchiectasis, depression, who comes to the ED after a fall down 4 steps that occurred ~ 12pm yesterday.  Pt does not remember how it happened.  Found by .  + L head strike.  No N/V.  Ambulated afterwards.  No numbness, weakness.  + pain to L shoulder and L face, otherwise no complaints.  Trauma noted to left temporal area, left shoulder and right forearm.  Pt denies F/C/N/V/D/urinary sx/CP/SOB, incontinence, tongue biting.  On exam, HDS, NAD, AAOx3, PERRLA, CN 2-12 intact, 5/5 strength, SILT, head with ecchymosis to L temporal area, no periorbital tenderness, cspine NTTP in midline, + FROM, rest of spine NTTP in midline, lungs CTAB, heart sounds normal, chest wall NTTP, abd benign, pelvis stable, L shoulder with ecchymosis and R forearm with ecchymosis and abrasions.  All ext without deformity or edema, + FROM, 2+ pulses throughout.  CT head and Cspine neg, CXR neg, EKG unchanged, trop 12 x 2, UA with mod blood.  Will admit to CDU for L shoulder XR, tetanus, ECHO.

## 2018-07-24 NOTE — ED PROVIDER NOTE - ATTENDING CONTRIBUTION TO CARE
Attending Attestation: Dr. Nichols  I have personally performed a history and physical examination of the patient and discussed management with the resident as well as the patient.  I reviewed the resident's note and agree with the documented findings and plan of care.  I have authored and modified critical sections of the Provider Note, including but not limited to HPI, Physical Exam and MDM. 82F with unwitnessed fall and LOC, no evidence of sz.  Also appears SOB and states it is more than usual. Will obtain labs/ cth, rule out electrolyte abnormalities, tele monitor for arrythmia, anemia, acs, ekg wnl.  Provide neb trial, reassess.  CDU vs admit.

## 2018-07-24 NOTE — ED ADULT TRIAGE NOTE - CHIEF COMPLAINT QUOTE
pt states that she was sitting on her steps outside yesterday and when she got up she fell.  Pt cannot recall why she fell, unknown LOC.  Pt c/o pain to left cheek and generalized weakness.  PMH HTN, depression

## 2018-07-24 NOTE — ED PROVIDER NOTE - MEDICAL DECISION MAKING DETAILS
82F with syncopal event, no evidence of sz, will obtain labs/ cth, rule out electrolyte abnormalities, tele monitor for arrythmia, anemia, acs, ekg wnl 82F with unwitnessed fall and LOC, no evidence of sz.  Also appears SOB and states it is more than usual. Will obtain labs/ cth, rule out electrolyte abnormalities, tele monitor for arrythmia, anemia, acs, ekg wnl.  Provide neb trial, reassess.  CDU vs admit.

## 2018-07-24 NOTE — ED ADULT NURSE NOTE - OBJECTIVE STATEMENT
Pt received in spot 25. Alert and oriented x3, ambulatory. Pt lives alone (grandson lives in upstairs apartment). Pt was sitting on stairs outside yesterday when she suddenly fell down 5 steps. Pt does not recall events prior to fall. +LOC for 2 minutes as per patient. Pt states when she woke up she was on the floor and a  was helping her off the floor. Ecchymosis noted to right arm and shoulder. Small abrasion noted above left eyebrow. Co pain to left cheek and generalized weakness. Hx of htn, depression. Denies recent med changes. Denies fevers, chills, n/v/d, dizziness. Labs sent. IV placed. On cardiac monitor. VS as stated. Report endorsed to primary RN Khadijah.

## 2018-07-24 NOTE — ED CDU PROVIDER INITIAL DAY NOTE - MEDICAL DECISION MAKING DETAILS
Syncope:  Tele monitoring, echo  shoulder pain- lt xray  abrasoins- tdap Syncope:  Tele monitoring, echo  shoulder pain- lt xray, abrasions- tdap    Cabot: 82F with PMH of HTN, bronchiectasis, depression, who comes to the ED after a fall down 4 steps that occurred ~ 12pm yesterday.  Pt does not remember how it happened.  Found by .  + L head strike.  No N/V.  Ambulated afterwards.  Trauma noted to left temporal area, left shoulder and right forearm.  Pt denies F/C/N/V/D/urinary sx/CP/SOB, incontinence, tongue biting.  On exam, HDS, NAD, AAOx3, PERRLA, CN 2-12 intact, 5/5 strength, SILT, head with ecchymosis to L temporal area, no periorbital tenderness, cspine NTTP in midline, + FROM, rest of spine NTTP in midline, lungs CTAB, heart sounds normal, chest wall NTTP, abd benign, pelvis stable, L shoulder with ecchymosis and R forearm with ecchymosis and abrasions.  All ext without deformity or edema, + FROM, 2+ pulses throughout.  CT head and Cspine neg, CXR neg, EKG unchanged, trop 12 x 2, UA with mod blood.  Will admit to CDU for L shoulder XR, tetanus, ECHO.

## 2018-07-24 NOTE — ED PROVIDER NOTE - OBJECTIVE STATEMENT
82 F hx HTN, bronchiectasis, depression, c/o fall down 4 steps occurring approx 12 pm while seated, unwitnessed with trauma to left temporal area, left shoulder and right forearm was found by , then was able to ambulate indoors without difficulty, maintaining ADLs, 82 F hx HTN, bronchiectasis, depression, c/o fall down 4 steps occurring approx 12 pm while standing from seated position, unwitnessed with trauma to left temporal area, left shoulder and right forearm was found by , then was able to ambulate indoors without difficulty, maintaining ADLs since incident. No reported incontinence, tongue biting, chest pain, palpitations, however patient cannot recall complete preceding event. No recent fevers/chills/ new cough/ new sob, notes generalized weakness and intermittent dizziness that is chronic- no dysuria/ n/v/d, travel or sick contacts, no new medication changes    Home meds include amlodipine, benazepril, xanax .5mg, and metoprolol. 82 F hx HTN, bronchiectasis, depression, c/o fall down 4 steps occurring approx 12 pm while trying to stand from seated position, unwitnessed with trauma to left temporal area, left shoulder and right forearm was found by , then was able to ambulate indoors without difficulty, maintaining ADLs since incident. No reported incontinence, tongue biting, chest pain, palpitations, however patient cannot recall complete preceding event. No recent fevers/chills/ new cough/ new sob, notes generalized weakness and intermittent dizziness that is chronic- no dysuria/ n/v/d, travel or sick contacts, no new medication changes.  States she has veeb feeling weaker than usual lately.     Home meds include amlodipine, benazepril, xanax .5mg, and metoprolol.

## 2018-07-24 NOTE — ED ADULT NURSE REASSESSMENT NOTE - NS ED NURSE REASSESS COMMENT FT1
Patient is alert and oriented x4, no c/o pain no respiratory distress. Pt. has bruising on her right lower arm inner aspect and left orbital area. Out of bed ambulating to bathroom no dizzyness, will continue to monitor.

## 2018-07-24 NOTE — ED PROVIDER NOTE - NEUROLOGICAL, MLM
Alert and oriented, no focal deficits, no motor or sensory deficits. CN II- XII intact,, normal gait.

## 2018-07-24 NOTE — ED CDU PROVIDER INITIAL DAY NOTE - PROGRESS NOTE DETAILS
[place]: 82F with PMH of HTN, bronchiectasis, depression, who comes to the ED after a fall down 4 steps that occurred ~ 12pm yesterday.  Pt does not remember how it happened.  Found by .  + L head strike.  No N/V.  Ambulated afterwards.  No numbness, weakness.  + pain to L shoulder and L face, otherwise no complaints.  Trauma noted to left temporal area, left shoulder and right forearm.  Pt denies F/C/N/V/D/urinary sx/CP/SOB, incontinence, tongue biting.  On exam, HDS, NAD, AAOx3, PERRLA, CN 2-12 intact, 5/5 strength, SILT, head with ecchymosis to L temporal area, no periorbital tenderness, cspine NTTP in midline, + FROM, rest of spine NTTP in midline, lungs CTAB, heart sounds normal, chest wall NTTP, abd benign, pelvis stable, L shoulder with ecchymosis and R forearm with ecchymosis and abrasions.  All ext without deformity or edema, + FROM, 2+ pulses throughout.  CT head and Cspine neg, CXR neg, EKG unchanged, trop 12 x 2, UA with mod blood.  Will admit to CDU for L shoulder XR, tetanus, ECHO.

## 2018-07-25 VITALS
OXYGEN SATURATION: 100 % | TEMPERATURE: 98 F | SYSTOLIC BLOOD PRESSURE: 172 MMHG | RESPIRATION RATE: 18 BRPM | HEART RATE: 77 BPM | DIASTOLIC BLOOD PRESSURE: 77 MMHG

## 2018-07-25 LAB
BACTERIA UR CULT: SIGNIFICANT CHANGE UP
SPECIMEN SOURCE: SIGNIFICANT CHANGE UP

## 2018-07-25 PROCEDURE — 99217: CPT | Mod: GC

## 2018-07-25 RX ADMIN — LISINOPRIL 10 MILLIGRAM(S): 2.5 TABLET ORAL at 06:55

## 2018-07-25 RX ADMIN — AMLODIPINE BESYLATE 5 MILLIGRAM(S): 2.5 TABLET ORAL at 06:55

## 2018-07-25 NOTE — ED CDU PROVIDER DISPOSITION NOTE - CLINICAL COURSE
Filomena: Syncope after standing yesterday. No HA/CP/SOB. EKG, labs, echo, CT, tele monitoring unremarkable. Walked in CDU w/o problem. Wants to go home. Close family lives upstairs. Discharge home.

## 2018-07-25 NOTE — ED CDU PROVIDER SUBSEQUENT DAY NOTE - ATTENDING CONTRIBUTION TO CARE
I performed a face-to-face evaluation of the patient and performed a history and physical examination. I agree with the history and physical examination.    [place]a: Syncope after standing yesterday. No HA/CP/SOB. EKG, labs, echo, CT, tele monitoring unremarkable. Walked in CDU w/o problem. Wants to go home. Close family lives upstairs.

## 2018-07-25 NOTE — ED CDU PROVIDER SUBSEQUENT DAY NOTE - HISTORY
82F with PMH of HTN, bronchiectasis, depression, who is admitted to the CDU for echo and tele monitoring s/o fall down stairs, unknown if trip and fall vs syncope w/ +head struck, questionable LOC. Pt presented to the ED BIBEMS, states she fell down 4 steps at home, which occurred around 12pm yesterday. Pt does not remember how it happened. Found by .  + L head strike.  No N/V.  Ambulated afterwards.  No numbness, weakness.  + pain to L shoulder and L face, otherwise no complaints. Denies F/C/N/V/D/urinary sx/CP/SOB, incontinence of bowel/bladder.

## 2018-07-25 NOTE — ED CDU PROVIDER SUBSEQUENT DAY NOTE - ENMT NEGATIVE STATEMENT, MLM
Ears: no ear pain and no hearing problems.Nose: no nasal congestion and no nasal drainage.Mouth/Throat: no dysphagia, no hoarseness and no throat pain.Neck: no lumps, no pain, no stiffness and no swollen glands Ears: no ear pain and no hearing problems. Nose: no nasal congestion and no nasal drainage. Mouth/Throat: no dysphagia, no hoarseness and no throat pain.Neck: no lumps, no pain, no stiffness and no swollen glands

## 2018-08-15 ENCOUNTER — EMERGENCY (EMERGENCY)
Facility: HOSPITAL | Age: 83
LOS: 1 days | Discharge: HOME | End: 2018-08-15
Attending: EMERGENCY MEDICINE | Admitting: EMERGENCY MEDICINE
Payer: COMMERCIAL

## 2018-08-15 VITALS
TEMPERATURE: 99 F | SYSTOLIC BLOOD PRESSURE: 192 MMHG | HEART RATE: 70 BPM | OXYGEN SATURATION: 96 % | DIASTOLIC BLOOD PRESSURE: 74 MMHG | RESPIRATION RATE: 18 BRPM

## 2018-08-15 DIAGNOSIS — Z90.49 ACQUIRED ABSENCE OF OTHER SPECIFIED PARTS OF DIGESTIVE TRACT: Chronic | ICD-10-CM

## 2018-08-15 PROBLEM — J47.9 BRONCHIECTASIS, UNCOMPLICATED: Chronic | Status: ACTIVE | Noted: 2018-07-24

## 2018-08-15 LAB
ALBUMIN SERPL ELPH-MCNC: 4.1 G/DL — SIGNIFICANT CHANGE UP (ref 3.3–5)
ALP SERPL-CCNC: 85 U/L — SIGNIFICANT CHANGE UP (ref 40–120)
ALT FLD-CCNC: 21 U/L — SIGNIFICANT CHANGE UP (ref 4–33)
AST SERPL-CCNC: 24 U/L — SIGNIFICANT CHANGE UP (ref 4–32)
BASE EXCESS BLDV CALC-SCNC: 4.6 MMOL/L — SIGNIFICANT CHANGE UP
BASOPHILS # BLD AUTO: 0.06 K/UL — SIGNIFICANT CHANGE UP (ref 0–0.2)
BASOPHILS NFR BLD AUTO: 0.7 % — SIGNIFICANT CHANGE UP (ref 0–2)
BILIRUB SERPL-MCNC: 0.5 MG/DL — SIGNIFICANT CHANGE UP (ref 0.2–1.2)
BLOOD GAS VENOUS - CREATININE: 0.59 MG/DL — SIGNIFICANT CHANGE UP (ref 0.5–1.3)
BUN SERPL-MCNC: 14 MG/DL — SIGNIFICANT CHANGE UP (ref 7–23)
CALCIUM SERPL-MCNC: 9.1 MG/DL — SIGNIFICANT CHANGE UP (ref 8.4–10.5)
CHLORIDE BLDV-SCNC: 100 MMOL/L — SIGNIFICANT CHANGE UP (ref 96–108)
CHLORIDE SERPL-SCNC: 96 MMOL/L — LOW (ref 98–107)
CO2 SERPL-SCNC: 25 MMOL/L — SIGNIFICANT CHANGE UP (ref 22–31)
CREAT SERPL-MCNC: 0.61 MG/DL — SIGNIFICANT CHANGE UP (ref 0.5–1.3)
EOSINOPHIL # BLD AUTO: 0.17 K/UL — SIGNIFICANT CHANGE UP (ref 0–0.5)
EOSINOPHIL NFR BLD AUTO: 1.9 % — SIGNIFICANT CHANGE UP (ref 0–6)
GAS PNL BLDV: 130 MMOL/L — LOW (ref 136–146)
GLUCOSE BLDV-MCNC: 140 — HIGH (ref 70–99)
GLUCOSE SERPL-MCNC: 130 MG/DL — HIGH (ref 70–99)
HCO3 BLDV-SCNC: 27 MMOL/L — SIGNIFICANT CHANGE UP (ref 20–27)
HCT VFR BLD CALC: 36.7 % — SIGNIFICANT CHANGE UP (ref 34.5–45)
HCT VFR BLDV CALC: 40.4 % — SIGNIFICANT CHANGE UP (ref 34.5–45)
HGB BLD-MCNC: 12.7 G/DL — SIGNIFICANT CHANGE UP (ref 11.5–15.5)
HGB BLDV-MCNC: 13.1 G/DL — SIGNIFICANT CHANGE UP (ref 11.5–15.5)
IMM GRANULOCYTES # BLD AUTO: 0.04 # — SIGNIFICANT CHANGE UP
IMM GRANULOCYTES NFR BLD AUTO: 0.4 % — SIGNIFICANT CHANGE UP (ref 0–1.5)
LACTATE BLDV-MCNC: 0.8 MMOL/L — SIGNIFICANT CHANGE UP (ref 0.5–2)
LYMPHOCYTES # BLD AUTO: 2.05 K/UL — SIGNIFICANT CHANGE UP (ref 1–3.3)
LYMPHOCYTES # BLD AUTO: 22.4 % — SIGNIFICANT CHANGE UP (ref 13–44)
MCHC RBC-ENTMCNC: 29.8 PG — SIGNIFICANT CHANGE UP (ref 27–34)
MCHC RBC-ENTMCNC: 34.6 % — SIGNIFICANT CHANGE UP (ref 32–36)
MCV RBC AUTO: 86.2 FL — SIGNIFICANT CHANGE UP (ref 80–100)
MONOCYTES # BLD AUTO: 0.79 K/UL — SIGNIFICANT CHANGE UP (ref 0–0.9)
MONOCYTES NFR BLD AUTO: 8.6 % — SIGNIFICANT CHANGE UP (ref 2–14)
NEUTROPHILS # BLD AUTO: 6.04 K/UL — SIGNIFICANT CHANGE UP (ref 1.8–7.4)
NEUTROPHILS NFR BLD AUTO: 66 % — SIGNIFICANT CHANGE UP (ref 43–77)
NRBC # FLD: 0 — SIGNIFICANT CHANGE UP
PCO2 BLDV: 53 MMHG — HIGH (ref 41–51)
PH BLDV: 7.37 PH — SIGNIFICANT CHANGE UP (ref 7.32–7.43)
PLATELET # BLD AUTO: 315 K/UL — SIGNIFICANT CHANGE UP (ref 150–400)
PMV BLD: 8.9 FL — SIGNIFICANT CHANGE UP (ref 7–13)
PO2 BLDV: 55 MMHG — HIGH (ref 35–40)
POTASSIUM BLDV-SCNC: 3.9 MMOL/L — SIGNIFICANT CHANGE UP (ref 3.4–4.5)
POTASSIUM SERPL-MCNC: 4.2 MMOL/L — SIGNIFICANT CHANGE UP (ref 3.5–5.3)
POTASSIUM SERPL-SCNC: 4.2 MMOL/L — SIGNIFICANT CHANGE UP (ref 3.5–5.3)
PROT SERPL-MCNC: 7.4 G/DL — SIGNIFICANT CHANGE UP (ref 6–8.3)
RBC # BLD: 4.26 M/UL — SIGNIFICANT CHANGE UP (ref 3.8–5.2)
RBC # FLD: 12.6 % — SIGNIFICANT CHANGE UP (ref 10.3–14.5)
SAO2 % BLDV: 84.8 % — SIGNIFICANT CHANGE UP (ref 60–85)
SODIUM SERPL-SCNC: 135 MMOL/L — SIGNIFICANT CHANGE UP (ref 135–145)
WBC # BLD: 9.15 K/UL — SIGNIFICANT CHANGE UP (ref 3.8–10.5)
WBC # FLD AUTO: 9.15 K/UL — SIGNIFICANT CHANGE UP (ref 3.8–10.5)

## 2018-08-15 PROCEDURE — 71046 X-RAY EXAM CHEST 2 VIEWS: CPT | Mod: 26

## 2018-08-15 PROCEDURE — 99283 EMERGENCY DEPT VISIT LOW MDM: CPT | Mod: GC

## 2018-08-15 NOTE — ED ADULT NURSE NOTE - OBJECTIVE STATEMENT
rec'd pt aaox3 in room 11 c/o a persistent cough, worsening over the past few days, states she noticed blood tinged sputum today when coughing up phlegm.  Pt. dx w/ bronchiectasis, states she coughs on a regular basis, has noticed blood in the phlegm that she coughs up in the past but not anytime in the recent months.  Pt. denies CP, reports mild 4/10 "lung pain" when coughing and mild SOB.  Denies HA, n/v/d, urinary symptoms, fever/chills, states she has never been dx or treated for tuberculosis in the past.  Pt. presently hypertensive, states she is complaint w/ her BP meds but is "nervous" at the present time.  VS otherwise stable.  20G IV saline lock placed in the L AC, labs drawn and sent as ordered.  Pt. awaiting results and dispo.

## 2018-08-15 NOTE — ED PROVIDER NOTE - ATTENDING CONTRIBUTION TO CARE
romel: hx bronchiectasis; etio??  last CT 2 months ago as opt with his pulmonary physician.  today, new onset coughing blood. small amounts. no other complaints.  exam: unremarkable. small amts of blood coughed in ED (few blood stains on towel)  CXR unremarkable.   Will discharge as pt is stable and no longer coughing blood and recc opt f/u with pulmonary next 1-2 days.

## 2018-08-15 NOTE — ED PROVIDER NOTE - OBJECTIVE STATEMENT
81 y/o F hx HTN, chronic bronchitis, bronchiectasis presents with hemoptysis 81 y/o F hx HTN, chronic bronchitis, bronchiectasis presents with hemoptysis x2 today described as small amount of bright red blood mixed with saliva. Patient states she has chronic cough and shortness of breath at baseline 2/2 bronchiectasis, but denies any acute worsening of the cough or shortness of breath. She denies any fever, chest pain, back pain, sore throat, rhinorrhea, neck pain, abd pain, n/v/d, or skin changes. She is followed by a pulmonologist, had a follow-up CT chest 2 months ago but unsure of the results. 83 y/o F hx HTN, chronic bronchitis, bronchiectasis presents with scant hemoptysis x2 today described as small amount of bright red blood mixed with saliva. Patient states she has chronic cough and shortness of breath at baseline 2/2 bronchiectasis, but denies any acute worsening of the cough or shortness of breath. She denies any fever, chest pain, back pain, sore throat, rhinorrhea, neck pain, abd pain, n/v/d, or skin changes. She is followed by a pulmonologist, had a follow-up CT chest 2 months ago but unsure of the results.

## 2018-08-15 NOTE — ED ADULT NURSE NOTE - NSIMPLEMENTINTERV_GEN_ALL_ED
Implemented All Fall with Harm Risk Interventions:  [place] to call system. Call bell, personal items and telephone within reach. Instruct patient to call for assistance. Room bathroom lighting operational. Non-slip footwear when patient is off stretcher. Physically safe environment: no spills, clutter or unnecessary equipment. Stretcher in lowest position, wheels locked, appropriate side rails in place. Provide visual cue, wrist band, yellow gown, etc. Monitor gait and stability. Monitor for mental status changes and reorient to person, place, and time. Review medications for side effects contributing to fall risk. Reinforce activity limits and safety measures with patient and family. Provide visual clues: red socks.

## 2018-08-15 NOTE — ED PROVIDER NOTE - PROGRESS NOTE DETAILS
Labs and chest x-ray were unremarkable. Patient has had no more hemoptysis in the Emergency Department. Patient currently asymptomatic. Has good follow-up with pulmonologist. Patient will be discharged home with instructions to follow-up with pulmonologist for outpatient work-up and return precautions.

## 2018-08-15 NOTE — ED PROVIDER NOTE - PLAN OF CARE
You were seen in the ED for coughing up blood. Your labwork and chest x-ray showed no abnormalities. You have not coughed up more blood while being observed in the ED. You should follow-up with your pulmonologist in the next 24-48 hours for further evaluation and work-up. Return to the ED immediately for any fevers, persistent coughing up blood, shortness of breath, chest pain, lightheadedness, or any other new or concerning symptoms.

## 2018-08-15 NOTE — ED PROVIDER NOTE - MEDICAL DECISION MAKING DETAILS
81 y/o F hx HTN, chronic bronchitis, bronchiectasis presents with scant hemoptysis x2. Will obtain basic labs, chest x-ray, reevaluate.

## 2018-08-15 NOTE — ED PROVIDER NOTE - CARE PLAN
Principal Discharge DX:	Hemoptysis Principal Discharge DX:	Hemoptysis  Assessment and plan of treatment:	You were seen in the ED for coughing up blood. Your labwork and chest x-ray showed no abnormalities. You have not coughed up more blood while being observed in the ED. You should follow-up with your pulmonologist in the next 24-48 hours for further evaluation and work-up. Return to the ED immediately for any fevers, persistent coughing up blood, shortness of breath, chest pain, lightheadedness, or any other new or concerning symptoms.

## 2018-08-16 VITALS
SYSTOLIC BLOOD PRESSURE: 174 MMHG | DIASTOLIC BLOOD PRESSURE: 60 MMHG | HEART RATE: 61 BPM | OXYGEN SATURATION: 97 % | RESPIRATION RATE: 16 BRPM

## 2019-04-19 NOTE — ED PROVIDER NOTE - CROS ED SKIN ALL NEG
Take motrin and tylenol for pain as needed. Nothing in vagina for 6 weeks, no sex, no tampons. Avoid strenous activity, no heavy lifting,  no pushing,  ambulation daily as tolerated. Shower daily, clean wound well and keep dry after. Follow up with your gynecologist in 2 weeks negative...

## 2019-07-06 ENCOUNTER — TRANSCRIPTION ENCOUNTER (OUTPATIENT)
Age: 84
End: 2019-07-06

## 2019-07-06 ENCOUNTER — EMERGENCY (EMERGENCY)
Facility: HOSPITAL | Age: 84
LOS: 1 days | Discharge: ANOTHER HOSPITAL | End: 2019-07-06
Attending: EMERGENCY MEDICINE | Admitting: EMERGENCY MEDICINE
Payer: MEDICARE

## 2019-07-06 ENCOUNTER — INPATIENT (INPATIENT)
Facility: HOSPITAL | Age: 84
LOS: 0 days | Discharge: HOME | DRG: 185 | End: 2019-07-07
Attending: SURGERY | Admitting: SURGERY
Payer: MEDICARE

## 2019-07-06 VITALS
HEART RATE: 60 BPM | TEMPERATURE: 98 F | SYSTOLIC BLOOD PRESSURE: 125 MMHG | OXYGEN SATURATION: 95 % | RESPIRATION RATE: 18 BRPM | DIASTOLIC BLOOD PRESSURE: 73 MMHG

## 2019-07-06 VITALS
HEART RATE: 60 BPM | RESPIRATION RATE: 16 BRPM | SYSTOLIC BLOOD PRESSURE: 127 MMHG | HEIGHT: 61 IN | DIASTOLIC BLOOD PRESSURE: 76 MMHG | WEIGHT: 119.93 LBS | OXYGEN SATURATION: 95 % | TEMPERATURE: 98 F

## 2019-07-06 VITALS
HEART RATE: 77 BPM | OXYGEN SATURATION: 98 % | SYSTOLIC BLOOD PRESSURE: 170 MMHG | DIASTOLIC BLOOD PRESSURE: 72 MMHG | RESPIRATION RATE: 16 BRPM | TEMPERATURE: 98 F

## 2019-07-06 DIAGNOSIS — Z90.49 ACQUIRED ABSENCE OF OTHER SPECIFIED PARTS OF DIGESTIVE TRACT: Chronic | ICD-10-CM

## 2019-07-06 LAB
ALBUMIN SERPL ELPH-MCNC: 4.1 G/DL — SIGNIFICANT CHANGE UP (ref 3.3–5)
ALP SERPL-CCNC: 75 U/L — SIGNIFICANT CHANGE UP (ref 40–120)
ALT FLD-CCNC: 26 U/L — SIGNIFICANT CHANGE UP (ref 4–33)
ANION GAP SERPL CALC-SCNC: 12 MMO/L — SIGNIFICANT CHANGE UP (ref 7–14)
ANION GAP SERPL CALC-SCNC: 12 MMO/L — SIGNIFICANT CHANGE UP (ref 7–14)
APPEARANCE UR: CLEAR — SIGNIFICANT CHANGE UP
APTT BLD: 28.2 SEC — SIGNIFICANT CHANGE UP (ref 27.5–36.3)
AST SERPL-CCNC: 32 U/L — SIGNIFICANT CHANGE UP (ref 4–32)
BASOPHILS # BLD AUTO: 0.03 K/UL — SIGNIFICANT CHANGE UP (ref 0–0.2)
BASOPHILS NFR BLD AUTO: 0.3 % — SIGNIFICANT CHANGE UP (ref 0–2)
BILIRUB SERPL-MCNC: 0.7 MG/DL — SIGNIFICANT CHANGE UP (ref 0.2–1.2)
BILIRUB UR-MCNC: NEGATIVE — SIGNIFICANT CHANGE UP
BLD GP AB SCN SERPL QL: NEGATIVE — SIGNIFICANT CHANGE UP
BLOOD UR QL VISUAL: NEGATIVE — SIGNIFICANT CHANGE UP
BUN SERPL-MCNC: 11 MG/DL — SIGNIFICANT CHANGE UP (ref 7–23)
BUN SERPL-MCNC: 9 MG/DL — SIGNIFICANT CHANGE UP (ref 7–23)
CALCIUM SERPL-MCNC: 9.4 MG/DL — SIGNIFICANT CHANGE UP (ref 8.4–10.5)
CALCIUM SERPL-MCNC: 9.5 MG/DL — SIGNIFICANT CHANGE UP (ref 8.4–10.5)
CHLORIDE SERPL-SCNC: 92 MMOL/L — LOW (ref 98–107)
CHLORIDE SERPL-SCNC: 95 MMOL/L — LOW (ref 98–107)
CHLORIDE UR-SCNC: 24 MMOL/L — SIGNIFICANT CHANGE UP
CO2 SERPL-SCNC: 25 MMOL/L — SIGNIFICANT CHANGE UP (ref 22–31)
CO2 SERPL-SCNC: 28 MMOL/L — SIGNIFICANT CHANGE UP (ref 22–31)
COLOR SPEC: COLORLESS — SIGNIFICANT CHANGE UP
CREAT SERPL-MCNC: 0.6 MG/DL — SIGNIFICANT CHANGE UP (ref 0.5–1.3)
CREAT SERPL-MCNC: 0.61 MG/DL — SIGNIFICANT CHANGE UP (ref 0.5–1.3)
EOSINOPHIL # BLD AUTO: 0.02 K/UL — SIGNIFICANT CHANGE UP (ref 0–0.5)
EOSINOPHIL NFR BLD AUTO: 0.2 % — SIGNIFICANT CHANGE UP (ref 0–6)
GLUCOSE SERPL-MCNC: 113 MG/DL — HIGH (ref 70–99)
GLUCOSE SERPL-MCNC: 136 MG/DL — HIGH (ref 70–99)
GLUCOSE UR-MCNC: NEGATIVE — SIGNIFICANT CHANGE UP
HCT VFR BLD CALC: 34.9 % — SIGNIFICANT CHANGE UP (ref 34.5–45)
HCT VFR BLD CALC: 35.7 % — SIGNIFICANT CHANGE UP (ref 34.5–45)
HGB BLD-MCNC: 12.6 G/DL — SIGNIFICANT CHANGE UP (ref 11.5–15.5)
HGB BLD-MCNC: 12.8 G/DL — SIGNIFICANT CHANGE UP (ref 11.5–15.5)
IMM GRANULOCYTES NFR BLD AUTO: 0.5 % — SIGNIFICANT CHANGE UP (ref 0–1.5)
INR BLD: 1.04 — SIGNIFICANT CHANGE UP (ref 0.88–1.17)
KETONES UR-MCNC: NEGATIVE — SIGNIFICANT CHANGE UP
LEUKOCYTE ESTERASE UR-ACNC: NEGATIVE — SIGNIFICANT CHANGE UP
LYMPHOCYTES # BLD AUTO: 1.14 K/UL — SIGNIFICANT CHANGE UP (ref 1–3.3)
LYMPHOCYTES # BLD AUTO: 11.6 % — LOW (ref 13–44)
MCHC RBC-ENTMCNC: 30.7 PG — SIGNIFICANT CHANGE UP (ref 27–34)
MCHC RBC-ENTMCNC: 32.3 PG — SIGNIFICANT CHANGE UP (ref 27–34)
MCHC RBC-ENTMCNC: 35.9 GM/DL — SIGNIFICANT CHANGE UP (ref 32–36)
MCHC RBC-ENTMCNC: 36.1 % — HIGH (ref 32–36)
MCV RBC AUTO: 85.1 FL — SIGNIFICANT CHANGE UP (ref 80–100)
MCV RBC AUTO: 90.2 FL — SIGNIFICANT CHANGE UP (ref 80–100)
MONOCYTES # BLD AUTO: 0.69 K/UL — SIGNIFICANT CHANGE UP (ref 0–0.9)
MONOCYTES NFR BLD AUTO: 7 % — SIGNIFICANT CHANGE UP (ref 2–14)
NEUTROPHILS # BLD AUTO: 7.9 K/UL — HIGH (ref 1.8–7.4)
NEUTROPHILS NFR BLD AUTO: 80.4 % — HIGH (ref 43–77)
NITRITE UR-MCNC: NEGATIVE — SIGNIFICANT CHANGE UP
NRBC # FLD: 0 K/UL — SIGNIFICANT CHANGE UP (ref 0–0)
OSMOLALITY SERPL: 284 MOSMO/KG — SIGNIFICANT CHANGE UP (ref 275–295)
OSMOLALITY UR: 130 MOSMO/KG — SIGNIFICANT CHANGE UP (ref 50–1200)
PH UR: 7.5 — SIGNIFICANT CHANGE UP (ref 5–8)
PLATELET # BLD AUTO: 271 K/UL — SIGNIFICANT CHANGE UP (ref 150–400)
PLATELET # BLD AUTO: 288 K/UL — SIGNIFICANT CHANGE UP (ref 150–400)
PMV BLD: 8.6 FL — SIGNIFICANT CHANGE UP (ref 7–13)
POTASSIUM SERPL-MCNC: 3.9 MMOL/L — SIGNIFICANT CHANGE UP (ref 3.5–5.3)
POTASSIUM SERPL-MCNC: 4 MMOL/L — SIGNIFICANT CHANGE UP (ref 3.5–5.3)
POTASSIUM SERPL-SCNC: 3.9 MMOL/L — SIGNIFICANT CHANGE UP (ref 3.5–5.3)
POTASSIUM SERPL-SCNC: 4 MMOL/L — SIGNIFICANT CHANGE UP (ref 3.5–5.3)
POTASSIUM UR-SCNC: 13 MMOL/L — SIGNIFICANT CHANGE UP
PROT SERPL-MCNC: 7.1 G/DL — SIGNIFICANT CHANGE UP (ref 6–8.3)
PROT UR-MCNC: NEGATIVE — SIGNIFICANT CHANGE UP
PROTHROM AB SERPL-ACNC: 11.6 SEC — SIGNIFICANT CHANGE UP (ref 9.8–13.1)
RBC # BLD: 3.95 M/UL — SIGNIFICANT CHANGE UP (ref 3.8–5.2)
RBC # BLD: 4.1 M/UL — SIGNIFICANT CHANGE UP (ref 3.8–5.2)
RBC # FLD: 11.6 % — SIGNIFICANT CHANGE UP (ref 10.3–14.5)
RBC # FLD: 12.3 % — SIGNIFICANT CHANGE UP (ref 10.3–14.5)
RH IG SCN BLD-IMP: NEGATIVE — SIGNIFICANT CHANGE UP
SODIUM SERPL-SCNC: 129 MMOL/L — LOW (ref 135–145)
SODIUM SERPL-SCNC: 135 MMOL/L — SIGNIFICANT CHANGE UP (ref 135–145)
SODIUM UR-SCNC: 30 MMOL/L — SIGNIFICANT CHANGE UP
SP GR SPEC: 1 — SIGNIFICANT CHANGE UP (ref 1–1.04)
TROPONIN T, HIGH SENSITIVITY: 11 NG/L — SIGNIFICANT CHANGE UP (ref ?–14)
TROPONIN T, HIGH SENSITIVITY: 11 NG/L — SIGNIFICANT CHANGE UP (ref ?–14)
TSH SERPL-MCNC: 1.86 UIU/ML — SIGNIFICANT CHANGE UP (ref 0.27–4.2)
UROBILINOGEN FLD QL: NORMAL — SIGNIFICANT CHANGE UP
WBC # BLD: 9.2 K/UL — SIGNIFICANT CHANGE UP (ref 3.8–10.5)
WBC # BLD: 9.83 K/UL — SIGNIFICANT CHANGE UP (ref 3.8–10.5)
WBC # FLD AUTO: 9.2 K/UL — SIGNIFICANT CHANGE UP (ref 3.8–10.5)
WBC # FLD AUTO: 9.83 K/UL — SIGNIFICANT CHANGE UP (ref 3.8–10.5)

## 2019-07-06 PROCEDURE — 99285 EMERGENCY DEPT VISIT HI MDM: CPT | Mod: 25

## 2019-07-06 PROCEDURE — 74177 CT ABD & PELVIS W/CONTRAST: CPT | Mod: 26

## 2019-07-06 PROCEDURE — 70450 CT HEAD/BRAIN W/O DYE: CPT | Mod: 26

## 2019-07-06 PROCEDURE — 93010 ELECTROCARDIOGRAM REPORT: CPT | Mod: 77

## 2019-07-06 PROCEDURE — 71101 X-RAY EXAM UNILAT RIBS/CHEST: CPT | Mod: 26,RT

## 2019-07-06 PROCEDURE — 93010 ELECTROCARDIOGRAM REPORT: CPT

## 2019-07-06 PROCEDURE — 71260 CT THORAX DX C+: CPT | Mod: 26

## 2019-07-06 PROCEDURE — 71046 X-RAY EXAM CHEST 2 VIEWS: CPT | Mod: 26,59

## 2019-07-06 RX ORDER — MORPHINE SULFATE 50 MG/1
4 CAPSULE, EXTENDED RELEASE ORAL ONCE
Refills: 0 | Status: DISCONTINUED | OUTPATIENT
Start: 2019-07-06 | End: 2019-07-06

## 2019-07-06 RX ORDER — ACETAMINOPHEN 500 MG
650 TABLET ORAL ONCE
Refills: 0 | Status: COMPLETED | OUTPATIENT
Start: 2019-07-06 | End: 2019-07-06

## 2019-07-06 RX ORDER — LIDOCAINE 4 G/100G
1 CREAM TOPICAL ONCE
Refills: 0 | Status: COMPLETED | OUTPATIENT
Start: 2019-07-06 | End: 2019-07-06

## 2019-07-06 RX ORDER — OXYCODONE HYDROCHLORIDE 5 MG/1
5 TABLET ORAL ONCE
Refills: 0 | Status: DISCONTINUED | OUTPATIENT
Start: 2019-07-06 | End: 2019-07-06

## 2019-07-06 RX ORDER — SODIUM CHLORIDE 9 MG/ML
500 INJECTION INTRAMUSCULAR; INTRAVENOUS; SUBCUTANEOUS ONCE
Refills: 0 | Status: COMPLETED | OUTPATIENT
Start: 2019-07-06 | End: 2019-07-06

## 2019-07-06 RX ORDER — MORPHINE SULFATE 50 MG/1
2 CAPSULE, EXTENDED RELEASE ORAL ONCE
Refills: 0 | Status: DISCONTINUED | OUTPATIENT
Start: 2019-07-06 | End: 2019-07-06

## 2019-07-06 RX ORDER — IBUPROFEN 200 MG
600 TABLET ORAL ONCE
Refills: 0 | Status: COMPLETED | OUTPATIENT
Start: 2019-07-06 | End: 2019-07-06

## 2019-07-06 RX ADMIN — OXYCODONE HYDROCHLORIDE 5 MILLIGRAM(S): 5 TABLET ORAL at 23:10

## 2019-07-06 RX ADMIN — LIDOCAINE 1 PATCH: 4 CREAM TOPICAL at 23:10

## 2019-07-06 RX ADMIN — MORPHINE SULFATE 4 MILLIGRAM(S): 50 CAPSULE, EXTENDED RELEASE ORAL at 21:35

## 2019-07-06 RX ADMIN — MORPHINE SULFATE 2 MILLIGRAM(S): 50 CAPSULE, EXTENDED RELEASE ORAL at 16:28

## 2019-07-06 RX ADMIN — LIDOCAINE 1 PATCH: 4 CREAM TOPICAL at 16:27

## 2019-07-06 RX ADMIN — MORPHINE SULFATE 4 MILLIGRAM(S): 50 CAPSULE, EXTENDED RELEASE ORAL at 21:18

## 2019-07-06 RX ADMIN — SODIUM CHLORIDE 500 MILLILITER(S): 9 INJECTION INTRAMUSCULAR; INTRAVENOUS; SUBCUTANEOUS at 20:04

## 2019-07-06 RX ADMIN — Medication 650 MILLIGRAM(S): at 23:10

## 2019-07-06 RX ADMIN — Medication 600 MILLIGRAM(S): at 23:10

## 2019-07-06 NOTE — ED PROVIDER NOTE - NS ED ROS FT
CONST: no fevers, chills, or lightheadedness  EYES: no pain, no vision change  HENT: atraumatic, no sore throat  CV: no chest pain, no palpitations  RESP: no shortness of breath  ABD: + abdominal pain, no nausea/vomiting  : no dysuria, no hematuria  MSK: R sided back/flank pain  NEURO: no headache, no focal weakness or loss of sensation  SKIN:  bruising on R flank area

## 2019-07-06 NOTE — ED PROVIDER NOTE - OBJECTIVE STATEMENT
Pt is an 84 y/o F nonsmoker PMHx HTN, chronic bronchitis, cholecystectomy p/w right chest wall pain since last night.  Pt states she fell in bathroom yesterday evening, but cannot recall fall or how she fell; pt states she does not think she hit her head.  Pt recalls walking to bathroom last night and she recalls getting up from floor and walking back to bed.  Pt notes moderate intensity right sided chest wall pain and bruising, which worsens with movement and deep breaths.  Pt states she takes Aspirin, but no other blood thinning agents.  Pt denies any fevers, chills, numbness, weakness, SOB, palpitations, headache, neck pain, back pain, abdominal pain, hematuria, dizziness, lightheadedness, calf pain/swelling, h/o dvt/pe, hemoptysis, h/o malignancy, recent surgeries, hormonal replacement therapy or any other specific complaints.

## 2019-07-06 NOTE — ED ADULT NURSE NOTE - NSIMPLEMENTINTERV_GEN_ALL_ED
Implemented All Fall Risk Interventions:  [place] to call system. Call bell, personal items and telephone within reach. Instruct patient to call for assistance. Room bathroom lighting operational. Non-slip footwear when patient is off stretcher. Physically safe environment: no spills, clutter or unnecessary equipment. Stretcher in lowest position, wheels locked, appropriate side rails in place. Provide visual cue, wrist band, yellow gown, etc. Monitor gait and stability. Monitor for mental status changes and reorient to person, place, and time. Review medications for side effects contributing to fall risk. Reinforce activity limits and safety measures with patient and family.

## 2019-07-06 NOTE — ED ADULT NURSE NOTE - NSIMPLEMENTINTERV_GEN_ALL_ED
Implemented All Fall Risk Interventions:  [place] to call system. Call bell, personal items and telephone within reach. Instruct patient to call for assistance. Room bathroom lighting operational. Non-slip footwear when patient is off stretcher. Physically safe environment: no spills, clutter or unnecessary equipment. Stretcher in lowest position, wheels locked, appropriate side rails in place. Provide visual cue, wrist band, yellow gown, etc. Monitor gait and stability. Monitor for mental status changes and reorient to person, place, and time. Review medications for side effects contributing to fall risk. Reinforce activity limits and safety measures with patient and family.

## 2019-07-06 NOTE — CONSULT NOTE ADULT - SUBJECTIVE AND OBJECTIVE BOX
Level III Trauma Activation    CC: Patient is a 83y old  Female who presents with a chief complaint of Fall     HPI: 83 F with PMH of HTN and bronchiectasis transferred from [facility] for multiple rib fractures. Patient had a fall last night, tripped over a rug on her way to the bathroom. Patient was able to pick herself up and went back to sleep, doesn't recall the details of what happened. She denies any lightheadedness or dizziness prior to the fall. No chest pain or shortness of breath. No nausea, vomiting, abdominal pain, diarrhea. Patient woke up in the morning with R sided chest wall and flank pain, saw that she had a lot of bruising and went to the ED. At [facility] patient had CTH, CT A/P, Xrays showing rib fractures.    Primary Survey  A - airway intact  B - bilateral breath sounds and good chest rise  C - initially BP: 141/56 (07-06-19 @ 22:22)  D - GCS 15 on arrival  Exposure obtained    Secondary survey  Gen: NAD  HEENT: NC/AT  CV: s1, s2, RRR  Pulm: CTA B/L  Chest: R chest wall tenderness to palpation, ecchymosis present throughout. 750+ on IS.   Abd: Soft, ND, NT, no rebound, no guarding  Groin: Normal appearing, No hip instability.   Back: no TTP, no palpable runoff, stepoff, or deformity    PMH  HTN, depression, bronchiectasis, macular degeneration.     PSH  Cholecystectomy at [facility]  ASA.   Allergies    No Known Allergies    Intolerances        Social:   Grandson lives upstairs, no smoking, drinking.     Labs:  Obtained at [facility].                     Imaging:   Obtained at [facility]. Level III Trauma Activation    CC: Patient is a 83y old  Female who presents with a chief complaint of Fall     HPI: 83 F with PMH of HTN and bronchiectasis transferred from [facility] for multiple rib fractures. Patient had a fall last night, tripped over a rug on her way to the bathroom. Patient was able to pick herself up and went back to sleep, doesn't recall the details of what happened. She denies any lightheadedness or dizziness prior to the fall. No chest pain or shortness of breath. No nausea, vomiting, abdominal pain, diarrhea. Patient woke up in the morning with R sided chest wall and flank pain, saw that she had a lot of bruising and went to the ED. At [facility] patient had CTH, CT A/P, Xrays showing rib fractures.    Primary Survey  A - airway intact  B - bilateral breath sounds and good chest rise  C - initially BP: 141/56 (07-06-19 @ 22:22)  D - GCS 15 on arrival  Exposure obtained    Secondary survey  Gen: NAD  HEENT: NC/AT  CV: s1, s2, RRR  Pulm: CTA B/L  Chest: R chest wall tenderness to palpation, ecchymosis present throughout. 750+ on IS.   Abd: Soft, ND, NT, no rebound, no guarding  Groin: Normal appearing, No hip instability.   Back: no TTP, no palpable runoff, stepoff, or deformity    PMH  HTN, depression, bronchiectasis, macular degeneration.     PSH  Cholecystectomy at [facility].   Allergies    No Known Allergies    Intolerances        Social:   Grandson lives upstairs, no smoking, drinking.     Labs:  Obtained at [facility]: largely unremarkable.     Imaging:   ******PRELIMINARY REPORT******            INTERPRETATION:  Acute fracture of right 10th and 11th ribs.    ******PRELIMINARY REPORT******            INTERPRETATION:  Acute fractures of inferior right lateral ribs.    Clear lungs.    CT head noncon  IMPRESSION:     Stable exam. No acute intracranial hemorrhage, mass effect from edema or   calvarial fracture. Chronic microvascular changes.    CT C/A/P  IMPRESSION:     Chest CT: Acute fractures of the right 9th, 10th and 11th ribs. No   pneumothorax or pleural effusion.  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 5 mm triangular nodule adjacent to the minor fissure the right   middle lobe is likely a intrafissural lymph node.    CT abdomen/pelvis: No posttraumatic sequela in the abdomen or pelvis.  2.6 x 1.6 cm indeterminate right adrenal nodule for which nonemergent   adrenal CT or MRI can be performed.

## 2019-07-06 NOTE — CONSULT NOTE ADULT - ATTENDING COMMENTS
seen and examined    right 9-10 lateral segmental displaced rib fractures  right 11-12 posterior nondisplaced rib fractures  -pain control    cystic bronchiectasis, severe in bilateral lower lobes, mild in RML and lingula  -unlikely MAC infection since most severe in lower lobes    2.6 x 1.6 cm indeterminate right adrenal nodule  -will need adrenal protocol CT as an outpatient    I discussed fall prevention with the patient and her son at bedside in the ER. She tripped and fell while using the bathroom overnight. I recommended installing nightlight and avoiding walking with socks on hardwood floors. seen and examined    right 9-10 lateral segmental displaced rib fractures  right 11-12 posterior nondisplaced rib fractures  -pain control    cystic bronchiectasis, severe in bilateral lower lobes, mild in RML and lingula  -unlikely MAC infection (as suggested in CT scan report) since most severe in lower lobes    2.6 x 1.6 cm indeterminate right adrenal nodule (HU = 60)  -will need adrenal protocol CT as an outpatient      I discussed fall prevention with the patient and her son at bedside in the ER. She tripped and fell while using the bathroom overnight. I recommended installing nightlight and avoiding walking with socks on hardwood floors.

## 2019-07-06 NOTE — ED PROVIDER NOTE - PHYSICAL EXAMINATION
*GEN:   comfortable, in no acute distress, AOx3  *EYES:   pupils equally round and reactive to light  *HEENT:   airway patent, moist mucosal membranes, full ROM neck  *CV:   regular rate and rhythm  *RESP:   clear to auscultation bilaterally, non-labored  *ABD:   soft, non-tender  *:   no cva/flank tenderness  *EXTREM:   R sided chest wall, abdominal tenderness  *SKIN:   dry, intact, bruising on R lateral side of abdomen, back   *NEURO:   AOx3, no focal weakness or loss of sensation

## 2019-07-06 NOTE — ED ADULT NURSE NOTE - OBJECTIVE STATEMENT
82 yo F w/ PMHx of HTN presents to ED via EMS transfer from [facility] c/o R rib fx x3. EMS reports pt being transferred for trauma consult, was seen at [facility] today after fall last night. Pt states she fell last night while walking to bathroom, but does not remember events of the fall. Pt denies pain or symptoms beside pain to R flank. Bruising present to R side flank and R shoulder. Pt reports increase in pain w/ movement. Pt denies any CP, SOB, N/V, fever, chills, urinary complaints, constipation, diarrhea, HA, dizziness, weakness. Pt A&Ox4, lungs CTA, +central pulses. Abdomen soft, not tender, not distended. Ambulating w/ steady gait, safety and comfort maintained, no acute distress noted at this time. 82 yo F w/ PMHx of HTN presents to ED via EMS transfer from [facility] c/o R rib fx x3. EMS reports pt being transferred for trauma consult, was seen at [facility] today after fall last night. Pt states she fell last night while walking to bathroom, but does not remember events of the fall. Today was still experiencing pain, went to [facility] for further eval, found to have 3 consecutive rib fx on R side. Received morphine for pain at [facility] prior to transfer. Pt denies pain or symptoms beside pain to R flank. Bruising present to R side flank and R shoulder. Pt reports increase in pain w/ movement. Pt denies any CP, SOB, N/V, fever, chills, urinary complaints, constipation, diarrhea, HA, dizziness, weakness. Pt A&Ox4, lungs CTA, +central pulses. Abdomen soft, not tender, not distended. Ambulates at baseline w/ no assistance, safety and comfort maintained, no acute distress noted at this time.

## 2019-07-06 NOTE — ED PROVIDER NOTE - ATTENDING CONTRIBUTION TO CARE
Dr. Wetzel (Attending Physician)  I performed a history and physical exam of the patient and discussed their management with the resident. I reviewed the resident's note and agree with the documented findings and plan of care. My medical decision making and observations are found above.

## 2019-07-06 NOTE — ED ADULT TRIAGE NOTE - CHIEF COMPLAINT QUOTE
unwitnessed fall last night in bathroom, denies hitting head/LOC, but states "I don't know why I fell", on 81mg ASA daily, c/o rt side pain, denies CP/HA/SOB/N/V/dizzy, pt ambulating in waiting room with no assistive device with steady gait

## 2019-07-06 NOTE — CONSULT NOTE ADULT - ASSESSMENT
83F presenting as transfer for Level III Trauma Consult following fall, found to have R rib fractures.     - Multimodal pain control for Rib Fx.   - Patient currently with adequate inspiration, continue to minimize splinting.   - Recommend Medicine admit for workup for possible syncope, pain control.     Trauma Surgery Pager #[phone] 83F presenting as transfer for Level III Trauma Consult following fall, found to have R rib fractures.     - Multimodal pain control for Rib Fx.   - Patient currently with adequate inspiration, continue to minimize splinting.   - Recommend Medicine admit for workup for possible syncope, pain control.   - Discussed with Trauma Attending Surgeon On-Call.     Trauma Surgery Pager #[phone]

## 2019-07-06 NOTE — ED PROVIDER NOTE - OBJECTIVE STATEMENT
83 F with PMH of HTN and bronchiectasis transferred from [facility] for multiple rib fractures. Patient had a fall last night, tripped over a rug on her way to the bathroom. Patient was able to pick herself up and went back to sleep, doesn't recall the details of what happened. She denies any lightheadedness or dizziness prior to the fall. No chest pain or shortness of breath. No nausea, vomiting, abdominal pain, diarrhea. Patient woke up in the morning with R sided chest wall and flank pain, saw that she had a lot of bruising and went to the ED. At [facility] patient had CTH, CT A/P, Xrays showing rib fractures. 83 F with PMH of HTN and bronchiectasis transferred from [facility] for multiple rib fractures. Patient had a fall last night, tripped over a rug in the bathroom. Patient was able to pick herself up and went back to sleep, doesn't recall the details of what happened. She did not hit her head or lose consciousness. Patient denies any lightheadedness or dizziness prior to the fall. No chest pain or shortness of breath. No nausea, vomiting, abdominal pain, diarrhea. Patient woke up in the morning with R sided chest wall and flank pain, saw that she had a lot of bruising and went to the ED. At [facility] patient had CTH, CT A/P, Xrays showing rib fractures.

## 2019-07-06 NOTE — ED PROVIDER NOTE - ATTENDING CONTRIBUTION TO CARE
I performed a face to face evaluation of this patient and obtained a history and performed a full exam.  I agree with the history, physical exam and plan of the PA.    Pt is an 82 y/o F nonsmoker PMHx HTN, chronic bronchitis, cholecystectomy p/w right chest wall pain since last night.  Patient reports fall in bathroom, was able to ambulate to bed after incident.  She does not recall further details and does not know if she sustained syncope vs. mechanical fall.  On ASA 81 mg, no blood thinners.  Currently reports only right sided chest wall pain, no sob.  VSS.  Exam non-toxic appearing, HEENT atraumatic, no c/t/l spine tenderness or stepoff, +large right sided ecchymoses to right chest with tenderness, +b/l breath sounds, no focal neuro deficit, distal pulses 2+.  Will obtain CT, x-rays, labs, ua, ekg.  If negative trauma workup, will likely admit for syncope workup given unk etiology of fall last night.

## 2019-07-06 NOTE — ED ADULT NURSE NOTE - CHPI ED NUR SYMPTOMS NEG
no back pain/no tingling/no abrasion/no deformity/no fever/no numbness/no stiffness/no weakness/no difficulty bearing weight

## 2019-07-06 NOTE — ED PROVIDER NOTE - PROGRESS NOTE DETAILS
ZACK OLIVEIRA DO- accepted s/o from Dr. Mcclure. elderly female with fall vs syncope yesterday and brusing to L chest/flank. cxr showing rib fractures. signed out to f/u CT chest/abd/pelvis to eval for amount of rib fractures and other injuries. PA RODRIGUES:  CT shows three consecutive rib fractures w/o pneumothorax.  Pt accepted for transfer to [facility].

## 2019-07-06 NOTE — ED PROVIDER NOTE - CLINICAL SUMMARY MEDICAL DECISION MAKING FREE TEXT BOX
Pt is an 84 y/o F nonsmoker PMHx HTN, chronic bronchitis, cholecystectomy p/w right chest wall pain since last night -- r/o rib fracture, r/o intra-abdominal pathology, r/o pneumothorax -- labs, trop, ct head, ct chest, ct abd and pelvis, cxr, xray ribs, pain control

## 2019-07-07 ENCOUNTER — TRANSCRIPTION ENCOUNTER (OUTPATIENT)
Age: 84
End: 2019-07-07

## 2019-07-07 VITALS
RESPIRATION RATE: 18 BRPM | DIASTOLIC BLOOD PRESSURE: 74 MMHG | HEART RATE: 62 BPM | SYSTOLIC BLOOD PRESSURE: 138 MMHG | OXYGEN SATURATION: 95 % | TEMPERATURE: 98 F

## 2019-07-07 DIAGNOSIS — S22.39XA FRACTURE OF ONE RIB, UNSPECIFIED SIDE, INITIAL ENCOUNTER FOR CLOSED FRACTURE: ICD-10-CM

## 2019-07-07 LAB
ANION GAP SERPL CALC-SCNC: 9 MMOL/L — SIGNIFICANT CHANGE UP (ref 5–17)
BUN SERPL-MCNC: 9 MG/DL — SIGNIFICANT CHANGE UP (ref 7–23)
CALCIUM SERPL-MCNC: 8.5 MG/DL — SIGNIFICANT CHANGE UP (ref 8.4–10.5)
CHLORIDE SERPL-SCNC: 97 MMOL/L — SIGNIFICANT CHANGE UP (ref 96–108)
CO2 SERPL-SCNC: 28 MMOL/L — SIGNIFICANT CHANGE UP (ref 22–31)
CREAT SERPL-MCNC: 0.58 MG/DL — SIGNIFICANT CHANGE UP (ref 0.5–1.3)
GLUCOSE SERPL-MCNC: 167 MG/DL — HIGH (ref 70–99)
MAGNESIUM SERPL-MCNC: 2.1 MG/DL — SIGNIFICANT CHANGE UP (ref 1.6–2.6)
POTASSIUM SERPL-MCNC: 4.2 MMOL/L — SIGNIFICANT CHANGE UP (ref 3.5–5.3)
POTASSIUM SERPL-SCNC: 4.2 MMOL/L — SIGNIFICANT CHANGE UP (ref 3.5–5.3)
SODIUM SERPL-SCNC: 134 MMOL/L — LOW (ref 135–145)
TROPONIN T, HIGH SENSITIVITY RESULT: 16 NG/L — SIGNIFICANT CHANGE UP (ref 0–51)

## 2019-07-07 PROCEDURE — 83735 ASSAY OF MAGNESIUM: CPT

## 2019-07-07 PROCEDURE — 99285 EMERGENCY DEPT VISIT HI MDM: CPT

## 2019-07-07 PROCEDURE — 85027 COMPLETE CBC AUTOMATED: CPT

## 2019-07-07 PROCEDURE — 99232 SBSQ HOSP IP/OBS MODERATE 35: CPT

## 2019-07-07 PROCEDURE — 97161 PT EVAL LOW COMPLEX 20 MIN: CPT

## 2019-07-07 PROCEDURE — 84484 ASSAY OF TROPONIN QUANT: CPT

## 2019-07-07 PROCEDURE — G0378: CPT

## 2019-07-07 PROCEDURE — 80048 BASIC METABOLIC PNL TOTAL CA: CPT

## 2019-07-07 PROCEDURE — 93005 ELECTROCARDIOGRAM TRACING: CPT

## 2019-07-07 RX ORDER — LIDOCAINE 4 G/100G
1 CREAM TOPICAL EVERY 24 HOURS
Refills: 0 | Status: DISCONTINUED | OUTPATIENT
Start: 2019-07-07 | End: 2019-07-07

## 2019-07-07 RX ORDER — ACETAMINOPHEN 500 MG
2 TABLET ORAL
Qty: 0 | Refills: 0 | DISCHARGE
Start: 2019-07-07

## 2019-07-07 RX ORDER — METOPROLOL TARTRATE 50 MG
100 TABLET ORAL DAILY
Refills: 0 | Status: DISCONTINUED | OUTPATIENT
Start: 2019-07-07 | End: 2019-07-07

## 2019-07-07 RX ORDER — ACETAMINOPHEN 500 MG
2 TABLET ORAL
Qty: 56 | Refills: 0
Start: 2019-07-07 | End: 2019-07-13

## 2019-07-07 RX ORDER — ZOLPIDEM TARTRATE 10 MG/1
5 TABLET ORAL AT BEDTIME
Refills: 0 | Status: DISCONTINUED | OUTPATIENT
Start: 2019-07-07 | End: 2019-07-07

## 2019-07-07 RX ORDER — TRAZODONE HCL 50 MG
50 TABLET ORAL DAILY
Refills: 0 | Status: DISCONTINUED | OUTPATIENT
Start: 2019-07-07 | End: 2019-07-07

## 2019-07-07 RX ORDER — HEPARIN SODIUM 5000 [USP'U]/ML
5000 INJECTION INTRAVENOUS; SUBCUTANEOUS EVERY 8 HOURS
Refills: 0 | Status: DISCONTINUED | OUTPATIENT
Start: 2019-07-07 | End: 2019-07-07

## 2019-07-07 RX ORDER — IBUPROFEN 200 MG
1 TABLET ORAL
Qty: 28 | Refills: 0
Start: 2019-07-07 | End: 2019-07-13

## 2019-07-07 RX ORDER — MULTIVIT-MIN/FERROUS GLUCONATE 9 MG/15 ML
1 LIQUID (ML) ORAL DAILY
Refills: 0 | Status: DISCONTINUED | OUTPATIENT
Start: 2019-07-07 | End: 2019-07-07

## 2019-07-07 RX ORDER — AMLODIPINE BESYLATE 2.5 MG/1
10 TABLET ORAL DAILY
Refills: 0 | Status: DISCONTINUED | OUTPATIENT
Start: 2019-07-07 | End: 2019-07-07

## 2019-07-07 RX ORDER — ACETAMINOPHEN 500 MG
650 TABLET ORAL EVERY 6 HOURS
Refills: 0 | Status: DISCONTINUED | OUTPATIENT
Start: 2019-07-07 | End: 2019-07-07

## 2019-07-07 RX ORDER — AMLODIPINE BESYLATE 2.5 MG/1
1 TABLET ORAL
Qty: 0 | Refills: 0 | DISCHARGE

## 2019-07-07 RX ORDER — ENOXAPARIN SODIUM 100 MG/ML
30 INJECTION SUBCUTANEOUS DAILY
Refills: 0 | Status: DISCONTINUED | OUTPATIENT
Start: 2019-07-07 | End: 2019-07-07

## 2019-07-07 RX ORDER — SERTRALINE 25 MG/1
100 TABLET, FILM COATED ORAL DAILY
Refills: 0 | Status: DISCONTINUED | OUTPATIENT
Start: 2019-07-07 | End: 2019-07-07

## 2019-07-07 RX ORDER — IBUPROFEN 200 MG
400 TABLET ORAL EVERY 6 HOURS
Refills: 0 | Status: DISCONTINUED | OUTPATIENT
Start: 2019-07-07 | End: 2019-07-07

## 2019-07-07 RX ORDER — IBUPROFEN 200 MG
1 TABLET ORAL
Qty: 0 | Refills: 0 | DISCHARGE
Start: 2019-07-07

## 2019-07-07 RX ORDER — LOSARTAN POTASSIUM 100 MG/1
100 TABLET, FILM COATED ORAL DAILY
Refills: 0 | Status: DISCONTINUED | OUTPATIENT
Start: 2019-07-07 | End: 2019-07-07

## 2019-07-07 RX ORDER — TRAMADOL HYDROCHLORIDE 50 MG/1
25 TABLET ORAL EVERY 6 HOURS
Refills: 0 | Status: DISCONTINUED | OUTPATIENT
Start: 2019-07-07 | End: 2019-07-07

## 2019-07-07 RX ORDER — ASPIRIN/CALCIUM CARB/MAGNESIUM 324 MG
81 TABLET ORAL DAILY
Refills: 0 | Status: DISCONTINUED | OUTPATIENT
Start: 2019-07-07 | End: 2019-07-07

## 2019-07-07 RX ADMIN — Medication 1 TABLET(S): at 12:39

## 2019-07-07 RX ADMIN — LOSARTAN POTASSIUM 100 MILLIGRAM(S): 100 TABLET, FILM COATED ORAL at 05:49

## 2019-07-07 RX ADMIN — Medication 650 MILLIGRAM(S): at 05:49

## 2019-07-07 RX ADMIN — Medication 650 MILLIGRAM(S): at 06:19

## 2019-07-07 RX ADMIN — Medication 650 MILLIGRAM(S): at 00:46

## 2019-07-07 RX ADMIN — Medication 650 MILLIGRAM(S): at 12:39

## 2019-07-07 RX ADMIN — LIDOCAINE 1 PATCH: 4 CREAM TOPICAL at 12:37

## 2019-07-07 RX ADMIN — Medication 600 MILLIGRAM(S): at 00:46

## 2019-07-07 RX ADMIN — Medication 50 MILLIGRAM(S): at 12:39

## 2019-07-07 RX ADMIN — Medication 650 MILLIGRAM(S): at 13:20

## 2019-07-07 RX ADMIN — SERTRALINE 100 MILLIGRAM(S): 25 TABLET, FILM COATED ORAL at 12:39

## 2019-07-07 RX ADMIN — Medication 100 MILLIGRAM(S): at 10:44

## 2019-07-07 RX ADMIN — OXYCODONE HYDROCHLORIDE 5 MILLIGRAM(S): 5 TABLET ORAL at 00:46

## 2019-07-07 RX ADMIN — ENOXAPARIN SODIUM 30 MILLIGRAM(S): 100 INJECTION SUBCUTANEOUS at 12:39

## 2019-07-07 RX ADMIN — Medication 81 MILLIGRAM(S): at 12:40

## 2019-07-07 RX ADMIN — AMLODIPINE BESYLATE 10 MILLIGRAM(S): 2.5 TABLET ORAL at 05:49

## 2019-07-07 RX ADMIN — LIDOCAINE 1 PATCH: 4 CREAM TOPICAL at 08:08

## 2019-07-07 NOTE — PHYSICAL THERAPY INITIAL EVALUATION ADULT - PERTINENT HX OF CURRENT PROBLEM, REHAB EVAL
83 F transferred from [facility] for multiple rib fractures. Pt had a fall last night, tripped over a rug on her way to the bathroom. Pt was able to pick herself up and went back to sleep, doesn't recall the details of what happened. She denies any lightheadedness or dizziness prior to the fall. No chest pain or shortness of breath.

## 2019-07-07 NOTE — H&P ADULT - ASSESSMENT
83F presenting as transfer for Level III Trauma Consult following fall, found to have R rib fractures.     - Admit to Trauma Surgery.   - Multimodal pain control for Rib Fx.   - Patient currently with adequate inspiration, continue to minimize splinting.   - Regular diet as tolerated.   - Discussed with Trauma Attending Surgeon On-Call.     Trauma Surgery Pager #[phone]

## 2019-07-07 NOTE — DISCHARGE NOTE PROVIDER - CARE PROVIDER_API CALL
Mg Bone)  Surgery; Surgical Critical Care  [address] [place] [address]  Phone: [phone]  Fax: [phone]  Follow Up Time: Joe Choudhury (MD)  Surgery; Surgical Critical Care  [address] [facility], [address], [place] [id number]  Phone: [phone]  Fax: [phone]  Follow Up Time:

## 2019-07-07 NOTE — PROGRESS NOTE ADULT - SUBJECTIVE AND OBJECTIVE BOX
HPI: 83 F with PMH of HTN and bronchiectasis transferred from [facility] for multiple rib fractures. Patient had a fall last night, tripped over a rug on her way to the bathroom. Patient was able to pick herself up and went back to sleep, doesn't recall the details of what happened. She denies any lightheadedness or dizziness prior to the fall. No chest pain or shortness of breath. No nausea, vomiting, abdominal pain, diarrhea. Patient woke up in the morning with R sided chest wall and flank pain, saw that she had a lot of bruising and went to the ED. At [facility] patient had CTH, CT A/P, Xrays showing rib fractures.    Vital Signs Last 24 Hrs  T(C): 36.7 (07 Jul 2019 04:51), Max: 36.7 (07 Jul 2019 04:51)  T(F): 98.1 (07 Jul 2019 04:51), Max: 98.1 (07 Jul 2019 04:51)  HR: 67 (07 Jul 2019 10:43) (57 - 71)  BP: 154/82 (07 Jul 2019 10:43) (127/76 - 154/82)  BP(mean): --  RR: 18 (07 Jul 2019 04:51) (16 - 18)  SpO2: 94% (07 Jul 2019 04:51) (94% - 98%)    I&O's Summary    06 Jul 2019 07:01  -  07 Jul 2019 07:00  --------------------------------------------------------  IN: 240 mL / OUT: 0 mL / NET: 240 mL        SUBJECTIVE: Pt seen and examined at the bedside. She is resting comfortably. Pain controlled. She has been out of bed. No acute events o/n.    Pain: [X ] YES [ ] NO  Pain (0-10):              Pain Control Adequate: [X ] YES [ ] NO  SOB: [ ]YES [X ] NO  Chest Discomfort: [ ] YES [X ] NO    Nausea: [ ] YES [X ] NO           Vomiting: [ ] YES [X ] NO  Flatus: [ ] YES [ ] NO             Bowel Movement: [ ] YES [ ] NO     Void: [ ]YES [ ]No    Physical Exam:  General Appearance: Appears well, NAD  Neck: Supple  Chest: Equal expansion bilaterally, equal breath sounds. Moderate chest wall tenderness, appropriate  CV: Pulse regular presently  Abdomen: Soft, nontense, appropriate incisional tenderness, dressings clean and dry and intact  Extremities: Grossly symmetric, SCD's in place     LABS:                        12.8   9.2   )-----------( 288      ( 06 Jul 2019 23:21 )             35.7     07-06    134<L>  |  97  |  9   ----------------------------<  167<H>  4.2   |  28  |  0.58    Ca    8.5      06 Jul 2019 23:21  Mg     2.1     07-06            RADIOLOGY & ADDITIONAL STUDIES:    Bari Carney MD  Surgery Resident

## 2019-07-07 NOTE — H&P ADULT - HISTORY OF PRESENT ILLNESS
CC: Patient is a 83y old  Female who presents with a chief complaint of Fall     HPI: 83 F with PMH of HTN and bronchiectasis transferred from [facility] for multiple rib fractures. Patient had a fall last night, tripped over a rug on her way to the bathroom. Patient was able to pick herself up and went back to sleep, doesn't recall the details of what happened. She denies any lightheadedness or dizziness prior to the fall. No chest pain or shortness of breath. No nausea, vomiting, abdominal pain, diarrhea. Patient woke up in the morning with R sided chest wall and flank pain, saw that she had a lot of bruising and went to the ED. At [facility] patient had CTH, CT A/P, Xrays showing rib fractures.    Primary Survey  A - airway intact  B - bilateral breath sounds and good chest rise  C - initially BP: 141/56 (07-06-19 @ 22:22)  D - GCS 15 on arrival  Exposure obtained    Secondary survey  Gen: NAD  HEENT: NC/AT  CV: s1, s2, RRR  Pulm: CTA B/L  Chest: R chest wall tenderness to palpation, ecchymosis present throughout. 750+ on IS.   Abd: Soft, ND, NT, no rebound, no guarding  Groin: Normal appearing, No hip instability.   Back: no TTP, no palpable runoff, stepoff, or deformity

## 2019-07-07 NOTE — H&P ADULT - ATTENDING COMMENTS
Patient seen and examined on AM rounds  Doing very well   Ambulating independently on unit  Pain with very good control  No new complaints    - I have discussed the incidental finding of an adrenal nodule on the patient's CT scan with her and have directly given her a copy of the CT report.  I have discussed that she finds to follow up with her primary care physician.  - ok to discharge home from trauma standpoint.

## 2019-07-07 NOTE — DISCHARGE NOTE PROVIDER - NSDCCPCAREPLAN_GEN_ALL_CORE_FT
PRINCIPAL DISCHARGE DIAGNOSIS  Diagnosis: Rib fractures  Assessment and Plan of Treatment:       SECONDARY DISCHARGE DIAGNOSES  Diagnosis: Right adrenal mass  Assessment and Plan of Treatment: Follow up with your primary care doctor to schedule a CT scan with adrenal protocol.

## 2019-07-07 NOTE — PROGRESS NOTE ADULT - ASSESSMENT
83F presenting as transfer for Level III Trauma Consult following fall, found to have R rib fractures.     Plan:  -Multimodal pain control for Rib Fx.   - Patient currently with adequate inspiration, continue to minimize splinting.   - Regular diet as tolerated.   -Discuss incidental adrenal nodule found on imaging and advise pt. to f/u with PCP  -D/c home following PT eval & OK

## 2019-07-07 NOTE — PHYSICAL THERAPY INITIAL EVALUATION ADULT - PRECAUTIONS/LIMITATIONS, REHAB EVAL
Pt woke up in the morning with R sided chest wall and flank pain, saw that she had a lot of bruising and went to the ED. At [facility] patient had CTH, CT A/P, Xrays showing rib fractures./fall precautions

## 2019-07-07 NOTE — H&P ADULT - NSHPLABSRESULTS_GEN_ALL_CORE
Labs Obtained at [facility]: largely unremarkable.     INTERPRETATION:  Acute fractures of inferior right lateral ribs.    Clear lungs.    CT head noncon  IMPRESSION:     Stable exam. No acute intracranial hemorrhage, mass effect from edema or   calvarial fracture. Chronic microvascular changes.    CT C/A/P  IMPRESSION:     Chest CT: Acute fractures of the right 9th, 10th and 11th ribs. No   pneumothorax or pleural effusion.  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 5 mm triangular nodule adjacent to the minor fissure the right   middle lobe is likely a intrafissural lymph node.    CT abdomen/pelvis: No posttraumatic sequela in the abdomen or pelvis.  2.6 x 1.6 cm indeterminate right adrenal nodule for wh

## 2019-07-07 NOTE — PHYSICAL THERAPY INITIAL EVALUATION ADULT - ADDITIONAL COMMENTS
Pt lives in private home with 5 steps to enter, first floor set-up inside home. Grandson lives upstairs. Prior to admission pt was independent with all functional mobility and did not use an AD to ambulate.

## 2019-07-07 NOTE — DISCHARGE NOTE PROVIDER - NSDCFUADDINST_GEN_ALL_CORE_FT
Please follow-up with your primary care provider within 1 week.    Please follow-up with Dr. Bone in 1-2 weeks regarding this issue.

## 2019-07-07 NOTE — DISCHARGE NOTE PROVIDER - HOSPITAL COURSE
HPI: 83 F with PMH of HTN and bronchiectasis transferred from [facility] for multiple rib fractures. Patient had a fall last night, tripped over a rug on her way to the bathroom. Patient was able to pick herself up and went back to sleep, doesn't recall the details of what happened. She denies any lightheadedness or dizziness prior to the fall. No chest pain or shortness of breath. No nausea, vomiting, abdominal pain, diarrhea. Patient woke up in the morning with R sided chest wall and flank pain, saw that she had a lot of bruising and went to the ED. At [facility] patient had CTH, CT A/P, Xrays showing rib fractures. HPI: 83 F with PMH of HTN and bronchiectasis transferred from [facility] for multiple rib fractures. Patient had a fall last night, tripped over a rug on her way to the bathroom. Patient was able to pick herself up and went back to sleep, doesn't recall the details of what happened. She denies any lightheadedness or dizziness prior to the fall. No chest pain or shortness of breath. No nausea, vomiting, abdominal pain, diarrhea. Patient woke up in the morning of presentation with R sided chest wall and flank pain, saw that she had a lot of bruising and went to the ED. At [facility] patient had CTH, CT A/P, Xrays showing rib fractures. She was transferred to [facility] and admitted to the trauma service. The patient's pain was controlled with ibuprofen and tylenol as needed for moderate pain. She was given supportive care as needed and her imaging was reviewed.         At the time of discharge, the patient is pulling approximately 1000mL on incentive spirometry and notes no significant issues. Her pain is well controlled.        There was also an incidental finding on this CT A/P of a right adrenal nodule measuring 2.6 x 1.6 cm. This nodule should be followed up by the patient's primary care provider.        At the time of discharge, the patient was hemodynamically stable, was tolerating PO diet, was voiding urine, was ambulating, and was comfortable with adequate pain control. The patient was instructed to follow up with Dr. Choudhury, within 1-2 weeks after discharge from the hospital. The patient/family felt comfortable with discharge. The patient was discharged to home.  The patient had no other issues.

## 2019-07-07 NOTE — DISCHARGE NOTE NURSING/CASE MANAGEMENT/SOCIAL WORK - NSDCDPATPORTLINK_GEN_ALL_CORE
You can access the [employer] Patient Portal, offered by [facility], by registering with the following website: [email or web address]

## 2019-07-07 NOTE — DISCHARGE NOTE PROVIDER - CARE PROVIDERS DIRECT ADDRESSES
[email or web address]

## 2019-07-08 LAB
BACTERIA UR CULT: SIGNIFICANT CHANGE UP
SPECIMEN SOURCE: SIGNIFICANT CHANGE UP

## 2019-07-09 NOTE — ED POST DISCHARGE NOTE - RESULT SUMMARY
culture grew 3 or more types of organisms  which indicate collection contamination, consider recollection only if clinically indicated. Patient elderly pt/ Patient contact # [phone] no answer alt # [phone] message left with Call Back  P.A. number and hours for return call back. and Admin resident #.

## 2019-07-27 ENCOUNTER — INPATIENT (INPATIENT)
Facility: HOSPITAL | Age: 84
LOS: 0 days | Discharge: HOME | End: 2019-07-28
Attending: HOSPITALIST | Admitting: HOSPITALIST
Payer: MEDICARE

## 2019-07-27 VITALS
DIASTOLIC BLOOD PRESSURE: 78 MMHG | SYSTOLIC BLOOD PRESSURE: 178 MMHG | RESPIRATION RATE: 18 BRPM | OXYGEN SATURATION: 96 % | HEART RATE: 98 BPM | TEMPERATURE: 98 F

## 2019-07-27 DIAGNOSIS — Z90.49 ACQUIRED ABSENCE OF OTHER SPECIFIED PARTS OF DIGESTIVE TRACT: Chronic | ICD-10-CM

## 2019-07-27 DIAGNOSIS — E87.1 HYPO-OSMOLALITY AND HYPONATREMIA: ICD-10-CM

## 2019-07-27 LAB
ALBUMIN SERPL ELPH-MCNC: 4 G/DL — SIGNIFICANT CHANGE UP (ref 3.3–5)
ALP SERPL-CCNC: 85 U/L — SIGNIFICANT CHANGE UP (ref 40–120)
ALT FLD-CCNC: 19 U/L — SIGNIFICANT CHANGE UP (ref 4–33)
ANION GAP SERPL CALC-SCNC: 11 MMO/L — SIGNIFICANT CHANGE UP (ref 7–14)
ANION GAP SERPL CALC-SCNC: 12 MMO/L — SIGNIFICANT CHANGE UP (ref 7–14)
APPEARANCE UR: CLEAR — SIGNIFICANT CHANGE UP
AST SERPL-CCNC: 20 U/L — SIGNIFICANT CHANGE UP (ref 4–32)
BASOPHILS # BLD AUTO: 0.05 K/UL — SIGNIFICANT CHANGE UP (ref 0–0.2)
BASOPHILS NFR BLD AUTO: 0.5 % — SIGNIFICANT CHANGE UP (ref 0–2)
BILIRUB SERPL-MCNC: 0.4 MG/DL — SIGNIFICANT CHANGE UP (ref 0.2–1.2)
BILIRUB UR-MCNC: NEGATIVE — SIGNIFICANT CHANGE UP
BLOOD UR QL VISUAL: NEGATIVE — SIGNIFICANT CHANGE UP
BUN SERPL-MCNC: 14 MG/DL — SIGNIFICANT CHANGE UP (ref 7–23)
BUN SERPL-MCNC: 14 MG/DL — SIGNIFICANT CHANGE UP (ref 7–23)
CALCIUM SERPL-MCNC: 9 MG/DL — SIGNIFICANT CHANGE UP (ref 8.4–10.5)
CALCIUM SERPL-MCNC: 9.2 MG/DL — SIGNIFICANT CHANGE UP (ref 8.4–10.5)
CHLORIDE SERPL-SCNC: 92 MMOL/L — LOW (ref 98–107)
CHLORIDE SERPL-SCNC: 95 MMOL/L — LOW (ref 98–107)
CO2 SERPL-SCNC: 22 MMOL/L — SIGNIFICANT CHANGE UP (ref 22–31)
CO2 SERPL-SCNC: 23 MMOL/L — SIGNIFICANT CHANGE UP (ref 22–31)
COLOR SPEC: COLORLESS — SIGNIFICANT CHANGE UP
CREAT SERPL-MCNC: 0.5 MG/DL — SIGNIFICANT CHANGE UP (ref 0.5–1.3)
CREAT SERPL-MCNC: 0.54 MG/DL — SIGNIFICANT CHANGE UP (ref 0.5–1.3)
EOSINOPHIL # BLD AUTO: 0.18 K/UL — SIGNIFICANT CHANGE UP (ref 0–0.5)
EOSINOPHIL NFR BLD AUTO: 1.9 % — SIGNIFICANT CHANGE UP (ref 0–6)
GLUCOSE SERPL-MCNC: 121 MG/DL — HIGH (ref 70–99)
GLUCOSE SERPL-MCNC: 187 MG/DL — HIGH (ref 70–99)
GLUCOSE UR-MCNC: NEGATIVE — SIGNIFICANT CHANGE UP
HCT VFR BLD CALC: 33.6 % — LOW (ref 34.5–45)
HGB BLD-MCNC: 11.8 G/DL — SIGNIFICANT CHANGE UP (ref 11.5–15.5)
IMM GRANULOCYTES NFR BLD AUTO: 0.3 % — SIGNIFICANT CHANGE UP (ref 0–1.5)
KETONES UR-MCNC: NEGATIVE — SIGNIFICANT CHANGE UP
LEUKOCYTE ESTERASE UR-ACNC: NEGATIVE — SIGNIFICANT CHANGE UP
LYMPHOCYTES # BLD AUTO: 1.25 K/UL — SIGNIFICANT CHANGE UP (ref 1–3.3)
LYMPHOCYTES # BLD AUTO: 13.4 % — SIGNIFICANT CHANGE UP (ref 13–44)
MAGNESIUM SERPL-MCNC: 1.7 MG/DL — SIGNIFICANT CHANGE UP (ref 1.6–2.6)
MCHC RBC-ENTMCNC: 29.9 PG — SIGNIFICANT CHANGE UP (ref 27–34)
MCHC RBC-ENTMCNC: 35.1 % — SIGNIFICANT CHANGE UP (ref 32–36)
MCV RBC AUTO: 85.3 FL — SIGNIFICANT CHANGE UP (ref 80–100)
MONOCYTES # BLD AUTO: 0.68 K/UL — SIGNIFICANT CHANGE UP (ref 0–0.9)
MONOCYTES NFR BLD AUTO: 7.3 % — SIGNIFICANT CHANGE UP (ref 2–14)
NEUTROPHILS # BLD AUTO: 7.14 K/UL — SIGNIFICANT CHANGE UP (ref 1.8–7.4)
NEUTROPHILS NFR BLD AUTO: 76.6 % — SIGNIFICANT CHANGE UP (ref 43–77)
NITRITE UR-MCNC: NEGATIVE — SIGNIFICANT CHANGE UP
NRBC # FLD: 0 K/UL — SIGNIFICANT CHANGE UP (ref 0–0)
OSMOLALITY SERPL: 267 MOSMO/KG — LOW (ref 275–295)
OSMOLALITY UR: 223 MOSMO/KG — SIGNIFICANT CHANGE UP (ref 50–1200)
PH UR: 7 — SIGNIFICANT CHANGE UP (ref 5–8)
PHOSPHATE SERPL-MCNC: 2.5 MG/DL — SIGNIFICANT CHANGE UP (ref 2.5–4.5)
PLATELET # BLD AUTO: 358 K/UL — SIGNIFICANT CHANGE UP (ref 150–400)
PMV BLD: 8.2 FL — SIGNIFICANT CHANGE UP (ref 7–13)
POTASSIUM SERPL-MCNC: 3.7 MMOL/L — SIGNIFICANT CHANGE UP (ref 3.5–5.3)
POTASSIUM SERPL-MCNC: 4.4 MMOL/L — SIGNIFICANT CHANGE UP (ref 3.5–5.3)
POTASSIUM SERPL-SCNC: 3.7 MMOL/L — SIGNIFICANT CHANGE UP (ref 3.5–5.3)
POTASSIUM SERPL-SCNC: 4.4 MMOL/L — SIGNIFICANT CHANGE UP (ref 3.5–5.3)
PROT SERPL-MCNC: 6.4 G/DL — SIGNIFICANT CHANGE UP (ref 6–8.3)
PROT UR-MCNC: NEGATIVE — SIGNIFICANT CHANGE UP
RBC # BLD: 3.94 M/UL — SIGNIFICANT CHANGE UP (ref 3.8–5.2)
RBC # FLD: 12 % — SIGNIFICANT CHANGE UP (ref 10.3–14.5)
SODIUM SERPL-SCNC: 126 MMOL/L — LOW (ref 135–145)
SODIUM SERPL-SCNC: 129 MMOL/L — LOW (ref 135–145)
SODIUM UR-SCNC: 37 MMOL/L — SIGNIFICANT CHANGE UP
SP GR SPEC: 1.01 — SIGNIFICANT CHANGE UP (ref 1–1.04)
TROPONIN T, HIGH SENSITIVITY: 10 NG/L — SIGNIFICANT CHANGE UP (ref ?–14)
TROPONIN T, HIGH SENSITIVITY: 11 NG/L — SIGNIFICANT CHANGE UP (ref ?–14)
TSH SERPL-MCNC: 1.23 UIU/ML — SIGNIFICANT CHANGE UP (ref 0.27–4.2)
UROBILINOGEN FLD QL: NORMAL — SIGNIFICANT CHANGE UP
WBC # BLD: 9.33 K/UL — SIGNIFICANT CHANGE UP (ref 3.8–10.5)
WBC # FLD AUTO: 9.33 K/UL — SIGNIFICANT CHANGE UP (ref 3.8–10.5)

## 2019-07-27 PROCEDURE — 71046 X-RAY EXAM CHEST 2 VIEWS: CPT | Mod: 26

## 2019-07-27 NOTE — ED ADULT NURSE REASSESSMENT NOTE - NS ED NURSE REASSESS COMMENT FT1
Received report from DORIAN Hill. Pt A&OX3, laying in bed comfortably. Vitals as noted. Respirations are equal and nonlabored, no distress noted. Pt c/o weakness but denies pain, sob, dizziness. Pt appears to be in no distress at this time. family at bedside. awaiting further plan

## 2019-07-27 NOTE — ED PROVIDER NOTE - MUSCULOSKELETAL NEGATIVE STATEMENT, MLM
positive weakness, mild pain over fx ribs, no back pain, no gout, no musculoskeletal pain, no neck pain

## 2019-07-27 NOTE — ED PROVIDER NOTE - PHYSICAL EXAMINATION
General appearance: NAD, conversant, afebrile    Eyes: anicteric sclerae, YASEMIN, EOMI   Pulm: CTA bl, normal respiratory effort and no intercostal retractions, normal work of breathing,    CV: RRR, No murmurs, rubs, or gallops. 2+ peripheral pulses.   Abdomen: Soft, non-tender, non-distended; no masses or hepatosplenomegaly.   Extremities: 1+ peripheral edema or extremity lymphadenopathy. 5/5 strength in all four extremities.   Skin: Dry, normal temperature, turgor and texture; no rash, ulcers or subcutaneous nodules   Psych: Cooperative; alert and oriented to person, place and time   Neuro: CN 2-12 intact, no fnds

## 2019-07-27 NOTE — ED ADULT TRIAGE NOTE - CHIEF COMPLAINT QUOTE
Pt. c/o weakness, dry cough, dizziness, and sob x few days. States she recently had dosages changed on her depression meds. Denies cp or fevers. Pmhx; bronchiectasis

## 2019-07-27 NOTE — ED PROVIDER NOTE - ATTENDING CONTRIBUTION TO CARE
I have personally performed a face to face bedside history and physical examination of this patient. I have discussed the history, examination, review of systems, assessment and plan of management with the resident. I have reviewed the electronic medical record and amended it to reflect my history, review of systems, physical exam, assessment and plan.    84 yo F pmh depression, anxiety, bronchiectasis, recent hospitalization for right sided rib fx presents with fatigue, sob.  Patient vague with history, but reports these sx for the last months but worsening in  3 days.  Denies cp, fever, cough, nausea, vomiting, dysuria.  Per son, has had similar presentations to ED with largely unremarkable workups.  No new falls or trauma.  Recently had anti-depressent decreased by psychiatrist.  VSS afebrile.  Exam non-toxic appearing, no focal neuro deficit, lungs clear, healing right sided chest wall ecchymoses that is non-tender, abdomen soft.  Unclear etiology of sx, no clear infectious source.  No c/f cva.  No c/f PE, ACS.  Will send labs, ekg, cxr, ua.  Dispo pending.

## 2019-07-27 NOTE — ED PROVIDER NOTE - CLINICAL SUMMARY MEDICAL DECISION MAKING FREE TEXT BOX
Patient with hyponatremia; consider SIADH.  Would send TSH and cortisol levels.  Repeat Na is 129 from 126; consider fluid restricting; appears euvolemic on exam.

## 2019-07-27 NOTE — ED ADULT NURSE NOTE - OBJECTIVE STATEMENT
Prince RN: Patient is an 84 y/o F a&ox4, primarily Polish speaking, translation services offered, patient refused requesting son to translate at bedside, p/w a c/c of SOB x3 days.  Patient endorses cough, denies fevers/chills, abd pain, GI/ symptoms, chest pain, N/V/D.  Patient reports she had recent change on xanax dosage from 0.5mg to 0.25mg.  Patient in nad, respirations unlabored, appears comfortable.

## 2019-07-27 NOTE — ED PROVIDER NOTE - OBJECTIVE STATEMENT
84yo female with pmhx depression, anxiety, brochiectasis, presenting with weakness, fatigue and sob.  States symptoms began 2 weeks ago following a fall where she fractured right ribs 9-11.  She was evaluated in the ED and discharged with pain medication that she has since finished and now takes tylenol for the pain.  States she feels sob at rest, not positionally dependent.  Also states alprazolam and zolpidem dosages were halved wednesday by pmd.  Denies fevers, n/v, cough, cp, abd pain.

## 2019-07-28 ENCOUNTER — TRANSCRIPTION ENCOUNTER (OUTPATIENT)
Age: 84
End: 2019-07-28

## 2019-07-28 VITALS
DIASTOLIC BLOOD PRESSURE: 97 MMHG | SYSTOLIC BLOOD PRESSURE: 121 MMHG | OXYGEN SATURATION: 97 % | TEMPERATURE: 98 F | RESPIRATION RATE: 17 BRPM | HEART RATE: 78 BPM

## 2019-07-28 DIAGNOSIS — J47.9 BRONCHIECTASIS, UNCOMPLICATED: ICD-10-CM

## 2019-07-28 DIAGNOSIS — F41.9 ANXIETY DISORDER, UNSPECIFIED: ICD-10-CM

## 2019-07-28 DIAGNOSIS — R53.83 OTHER FATIGUE: ICD-10-CM

## 2019-07-28 DIAGNOSIS — I10 ESSENTIAL (PRIMARY) HYPERTENSION: ICD-10-CM

## 2019-07-28 DIAGNOSIS — E87.1 HYPO-OSMOLALITY AND HYPONATREMIA: ICD-10-CM

## 2019-07-28 DIAGNOSIS — J90 PLEURAL EFFUSION, NOT ELSEWHERE CLASSIFIED: ICD-10-CM

## 2019-07-28 DIAGNOSIS — Z29.9 ENCOUNTER FOR PROPHYLACTIC MEASURES, UNSPECIFIED: ICD-10-CM

## 2019-07-28 LAB
ANION GAP SERPL CALC-SCNC: 11 MMO/L — SIGNIFICANT CHANGE UP (ref 7–14)
BUN SERPL-MCNC: 12 MG/DL — SIGNIFICANT CHANGE UP (ref 7–23)
CALCIUM SERPL-MCNC: 9.1 MG/DL — SIGNIFICANT CHANGE UP (ref 8.4–10.5)
CHLORIDE SERPL-SCNC: 95 MMOL/L — LOW (ref 98–107)
CO2 SERPL-SCNC: 23 MMOL/L — SIGNIFICANT CHANGE UP (ref 22–31)
CORTIS SERPL-MCNC: 28 UG/DL — HIGH (ref 2.7–18.4)
CREAT SERPL-MCNC: 0.53 MG/DL — SIGNIFICANT CHANGE UP (ref 0.5–1.3)
GLUCOSE SERPL-MCNC: 126 MG/DL — HIGH (ref 70–99)
HCT VFR BLD CALC: 33.8 % — LOW (ref 34.5–45)
HGB BLD-MCNC: 12.1 G/DL — SIGNIFICANT CHANGE UP (ref 11.5–15.5)
MAGNESIUM SERPL-MCNC: 1.9 MG/DL — SIGNIFICANT CHANGE UP (ref 1.6–2.6)
MCHC RBC-ENTMCNC: 31.4 PG — SIGNIFICANT CHANGE UP (ref 27–34)
MCHC RBC-ENTMCNC: 35.8 % — SIGNIFICANT CHANGE UP (ref 32–36)
MCV RBC AUTO: 87.8 FL — SIGNIFICANT CHANGE UP (ref 80–100)
NRBC # FLD: 0 K/UL — SIGNIFICANT CHANGE UP (ref 0–0)
PHOSPHATE SERPL-MCNC: 3.1 MG/DL — SIGNIFICANT CHANGE UP (ref 2.5–4.5)
PLATELET # BLD AUTO: 371 K/UL — SIGNIFICANT CHANGE UP (ref 150–400)
PMV BLD: 8.4 FL — SIGNIFICANT CHANGE UP (ref 7–13)
POTASSIUM SERPL-MCNC: 4.1 MMOL/L — SIGNIFICANT CHANGE UP (ref 3.5–5.3)
POTASSIUM SERPL-SCNC: 4.1 MMOL/L — SIGNIFICANT CHANGE UP (ref 3.5–5.3)
RBC # BLD: 3.85 M/UL — SIGNIFICANT CHANGE UP (ref 3.8–5.2)
RBC # FLD: 12.2 % — SIGNIFICANT CHANGE UP (ref 10.3–14.5)
SODIUM SERPL-SCNC: 129 MMOL/L — LOW (ref 135–145)
WBC # BLD: 7.16 K/UL — SIGNIFICANT CHANGE UP (ref 3.8–10.5)
WBC # FLD AUTO: 7.16 K/UL — SIGNIFICANT CHANGE UP (ref 3.8–10.5)

## 2019-07-28 PROCEDURE — 99223 1ST HOSP IP/OBS HIGH 75: CPT

## 2019-07-28 PROCEDURE — 12345: CPT | Mod: NC

## 2019-07-28 RX ORDER — LOSARTAN POTASSIUM 100 MG/1
100 TABLET, FILM COATED ORAL DAILY
Refills: 0 | Status: DISCONTINUED | OUTPATIENT
Start: 2019-07-28 | End: 2019-07-28

## 2019-07-28 RX ORDER — BUDESONIDE AND FORMOTEROL FUMARATE DIHYDRATE 160; 4.5 UG/1; UG/1
2 AEROSOL RESPIRATORY (INHALATION)
Refills: 0 | Status: DISCONTINUED | OUTPATIENT
Start: 2019-07-28 | End: 2019-07-28

## 2019-07-28 RX ORDER — SERTRALINE 25 MG/1
100 TABLET, FILM COATED ORAL DAILY
Refills: 0 | Status: DISCONTINUED | OUTPATIENT
Start: 2019-07-28 | End: 2019-07-28

## 2019-07-28 RX ORDER — ZOLPIDEM TARTRATE 10 MG/1
1 TABLET ORAL
Qty: 0 | Refills: 0 | DISCHARGE

## 2019-07-28 RX ORDER — TRAZODONE HCL 50 MG
50 TABLET ORAL DAILY
Refills: 0 | Status: DISCONTINUED | OUTPATIENT
Start: 2019-07-28 | End: 2019-07-28

## 2019-07-28 RX ORDER — METOPROLOL TARTRATE 50 MG
100 TABLET ORAL
Qty: 0 | Refills: 0 | DISCHARGE

## 2019-07-28 RX ORDER — METOPROLOL TARTRATE 50 MG
100 TABLET ORAL DAILY
Refills: 0 | Status: DISCONTINUED | OUTPATIENT
Start: 2019-07-28 | End: 2019-07-28

## 2019-07-28 RX ORDER — ALPRAZOLAM 0.25 MG
0.25 TABLET ORAL AT BEDTIME
Refills: 0 | Status: DISCONTINUED | OUTPATIENT
Start: 2019-07-28 | End: 2019-07-28

## 2019-07-28 RX ORDER — AMLODIPINE BESYLATE 2.5 MG/1
10 TABLET ORAL DAILY
Refills: 0 | Status: DISCONTINUED | OUTPATIENT
Start: 2019-07-28 | End: 2019-07-28

## 2019-07-28 RX ORDER — ALPRAZOLAM 0.25 MG
0 TABLET ORAL
Qty: 0 | Refills: 0 | DISCHARGE

## 2019-07-28 RX ORDER — ACETAMINOPHEN 500 MG
650 TABLET ORAL EVERY 6 HOURS
Refills: 0 | Status: DISCONTINUED | OUTPATIENT
Start: 2019-07-28 | End: 2019-07-28

## 2019-07-28 RX ORDER — ASPIRIN/CALCIUM CARB/MAGNESIUM 324 MG
81 TABLET ORAL DAILY
Refills: 0 | Status: DISCONTINUED | OUTPATIENT
Start: 2019-07-28 | End: 2019-07-28

## 2019-07-28 RX ADMIN — AMLODIPINE BESYLATE 10 MILLIGRAM(S): 2.5 TABLET ORAL at 07:26

## 2019-07-28 RX ADMIN — LOSARTAN POTASSIUM 100 MILLIGRAM(S): 100 TABLET, FILM COATED ORAL at 07:26

## 2019-07-28 RX ADMIN — Medication 81 MILLIGRAM(S): at 12:54

## 2019-07-28 RX ADMIN — Medication 100 MILLIGRAM(S): at 07:26

## 2019-07-28 NOTE — DISCHARGE NOTE PROVIDER - NSDCCPCAREPLAN_GEN_ALL_CORE_FT
PRINCIPAL DISCHARGE DIAGNOSIS  Diagnosis: Fatigue  Assessment and Plan of Treatment: You presented to the hospital with weakness and fatigue, however after few hours reported that you are back to baseline. Your workup in the hospital did not reveal any source of infection, and your blood work was largely normal. Please follow up with your PCP regularly.      SECONDARY DISCHARGE DIAGNOSES  Diagnosis: Hyponatremia  Assessment and Plan of Treatment: You were found to have low sodium on your blood work, which improved during your stay. Please restrict your fluid intake to 1.5L per day for the next few days until you see your primary care doctor for repeat blood work.

## 2019-07-28 NOTE — H&P ADULT - NSHPPHYSICALEXAM_GEN_ALL_CORE
Vital Signs Last 24 Hrs  T(C): 36.6 (28 Jul 2019 01:16), Max: 36.8 (27 Jul 2019 14:41)  T(F): 97.8 (28 Jul 2019 01:16), Max: 98.3 (27 Jul 2019 14:41)  HR: 77 (28 Jul 2019 01:16) (70 - 98)  BP: 154/65 (28 Jul 2019 01:16) (138/60 - 178/78)  BP(mean): --  RR: 16 (28 Jul 2019 01:16) (16 - 18)  SpO2: 96% (28 Jul 2019 01:16) (96% - 97%)    GENERAL: No acute distress, well-developed  ENT: EOMI, PERRL, conjunctiva and sclera clear, Neck supple, No JVD, moist mucosa  CHEST/LUNG: Clear to auscultation bilaterally; No wheeze, equal breath sounds bilaterally, no pain on palpation of the R lower lung  HEART: Regular rate and rhythm; No murmurs, rubs, or gallops  ABDOMEN: Soft, Nontender, Nondistended; Bowel sounds present  EXTREMITIES: No clubbing, cyanosis, or edema  PSYCH: Nl behavior, mildly anxious  NEUROLOGY: AAOx3, non-focal  SKIN: Normal color, No rashes or lesions

## 2019-07-28 NOTE — PROGRESS NOTE ADULT - PROBLEM SELECTOR PLAN 1
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  - Will also check orthostatic vitals as patient c/o dizziness with standing - Suspect her worsening fatigue is likely in the setting of the recent fall with R sided rib fractures, while the patient's son states that she is mobile and eating well I suspect she has likely become more sedentary since her fall  - this morning, patient reports she is feeling well and would like to go home  - will discuss with son regarding PT evaluation   - 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

## 2019-07-28 NOTE — DISCHARGE NOTE PROVIDER - NSDCFUADDAPPT_GEN_ALL_CORE_FT
1. Please follow up with your primary care doctor and pulmonologist within 1 week of discharge. You will need repeat blood work at your PCP's office to evaluate your low sodium, and a chest x ray with your lung doctor to evaluation a small fluid collection around your lung.

## 2019-07-28 NOTE — DISCHARGE NOTE PROVIDER - PROVIDER TOKENS
FREE:[LAST:[Parish],FIRST:[Preet],PHONE:[(   )    -],FAX:[(   )    -],ADDRESS:[Address: [address] [place] [address]  Phone: [phone]]]

## 2019-07-28 NOTE — H&P ADULT - PROBLEM SELECTOR PLAN 6
- c/w home sertraline, trazodone, and decreased dose of alprazolam prn (Reference #: [id number]), holding ambien for now

## 2019-07-28 NOTE — PROGRESS NOTE ADULT - PROBLEM SELECTOR PLAN 3
- Noted to have a small pleural effusion associated with the area of her known rib fractures  - Would check a AM CXR to assess stability, consider Thoracic eval in AM if the effusion is worsening  - Would encourage incentive spirometer, pain control as needed - Noted to have a small pleural effusion associated with the area of her known rib fractures  - follow up AM CXR to evaluate for stability   - Would encourage incentive spirometer, pain control as needed

## 2019-07-28 NOTE — PROGRESS NOTE ADULT - PROBLEM SELECTOR PLAN 5
- Breo ellipta not on formulary, will order for symbicort for now - Breo ellipta not on formulary, will order for symbicort for now  - CT chest from 7/6 concerning for LINDSEY- however patient does not have any symptoms of disseminated LINDSEY  - will need close pulmonary follow up

## 2019-07-28 NOTE — H&P ADULT - NSHPREVIEWOFSYSTEMS_GEN_ALL_CORE
REVIEW OF SYSTEMS:    CONSTITUTIONAL: +Generalized weakness/fatigue, No fevers or chills  EYES: No visual changes or eye discharge  ENT: No rhinorrhea or sore throat  NECK: No pain or stiffness  RESPIRATORY: +Chronic cough 2/2 bronchiectasis, no sputum production, no pleuritic chest pain, No wheezing, No shortness of breath  CARDIOVASCULAR: No chest pain or palpitations; No lower extremity edema  GASTROINTESTINAL: No abdominal or epigastric pain. No nausea, vomiting, or hematemesis; No diarrhea or constipation. No melena or hematochezia.  BACK: No back pain  GENITOURINARY: No dysuria, frequency or hematuria  NEUROLOGICAL: No numbness or weakness  SKIN: No itching, burning, rashes, or lesions

## 2019-07-28 NOTE — DISCHARGE NOTE PROVIDER - CARE PROVIDER_API CALL
Preet Lugo  Address: [address] [place] [address]  Phone: [phone]  Phone: (   )    -  Fax: (   )    -  Follow Up Time:

## 2019-07-28 NOTE — H&P ADULT - PROBLEM SELECTOR PLAN 3
- Noted to have a small pleural effusion associated with the area of her known rib fractures  - Would check a AM CXR to assess stability, consider Thoracic eval in AM if the effusion is worsening  - Would encourage incentive spirometer, pain control as needed

## 2019-07-28 NOTE — PROGRESS NOTE ADULT - ASSESSMENT
This is an 83F with history as above who presents to the hospital with complaints of worsening fatigue. Also with mild hyponatremia and new small R sided pleural effusion. 83F with PMhx of     This is an 83F with history as above who presents to the hospital with complaints of worsening fatigue. Also with mild hyponatremia and new small R sided pleural effusion. 83F with PMhx of HTN, Bronchiectasis, and asthma, with recent admission for rib fracture, presents to the ED with worsening fatigue with course complicated by mild hyponatremia and new pleural effusion.

## 2019-07-28 NOTE — H&P ADULT - PROBLEM SELECTOR PLAN 7
Subjective:       Patient ID:  Patsy Morris is a 71 y.o. female who presents for   Chief Complaint   Patient presents with    Rash     under breast/ creases, curretn [place] of neosproin/lotrimun/neosporin      Pt is here today with a c/o of rash under her breast and to the creases of her legs. Pt states that rash has been present for about 3 weeks. She has tried OTC neosporin, gold bond powder and lotrimin powder.       Rash  - Initial  Affected locations: chest  Duration: 3 weeks  Signs / symptoms: burning and redness  Severity: mild  Timing: constant  Aggravated by: sweating  Relieving factors/Treatments tried: OTC antibiotic cream and OTC antifungals  Improvement on treatment: no relief    Lesion  - Initial  Affected locations: left cheek  Duration: several months.  Signs / symptoms: itching  Severity: moderate  Timing: intermittent  Aggravated by: nothing  Relieving factors/Treatments tried: nothing      Review of Systems   Skin: Positive for itching and rash.   Hematologic/Lymphatic: Does not bruise/bleed easily.        Objective:    Physical Exam   Constitutional: She appears well-developed and well-nourished. No distress.   HENT:   Mouth/Throat: Lips normal.    Eyes: Lids are normal.  No conjunctival no injection.   Neurological: She is alert and oriented to person, place, and time. She is not disoriented.   Psychiatric: She has a normal mood and affect.   Skin:   Areas Examined (abnormalities noted in diagram):   Head / Face Inspection Performed  Neck Inspection Performed  Chest / Axilla Inspection Performed  Abdomen Inspection Performed  Genitals / Buttocks / Groin Inspection Performed  RUE Inspected  LUE Inspection Performed  RLE Inspected  LLE Inspection Performed                   Diagram Legend     Erythematous scaling macule/papule c/w actinic keratosis       Vascular papule c/w angioma      Pigmented verrucoid papule/plaque c/w seborrheic keratosis      Yellow umbilicated papule c/w sebaceous  hyperplasia      Irregularly shaped tan macule c/w lentigo     1-2 mm smooth white papules consistent with Milia      Movable subcutaneous cyst with punctum c/w epidermal inclusion cyst      Subcutaneous movable cyst c/w pilar cyst      Firm pink to brown papule c/w dermatofibroma      Pedunculated fleshy papule(s) c/w skin tag(s)      Evenly pigmented macule c/w junctional nevus     Mildly variegated pigmented, slightly irregular-bordered macule c/w mildly atypical nevus      Flesh colored to evenly pigmented papule c/w intradermal nevus       Pink pearly papule/plaque c/w basal cell carcinoma      Erythematous hyperkeratotic cursted plaque c/w SCC      Surgical scar with no sign of skin cancer recurrence      Open and closed comedones      Inflammatory papules and pustules      Verrucoid papule consistent consistent with wart     Erythematous eczematous patches and plaques     Dystrophic onycholytic nail with subungual debris c/w onychomycosis     Umbilicated papule    Erythematous-base heme-crusted tan verrucoid plaque consistent with inflamed seborrheic keratosis     Erythematous Silvery Scaling Plaque c/w Psoriasis     See annotation      Assessment / Plan:        Inflamed seborrheic keratosis  Cryosurgery procedure note:  Risk, benefits, and alternatives of cryosurgery are discussed with the patient, including risk of hypo- or hyperpigmentation, scar, infection, recurrence, and need for additional treatment of site. Verbal consent obtained from patient. Liquid nitrogen cryosurgery applied to 1 lesion(s) to produce a freeze injury. Counseled patient that blisters may form and instructed patient on wound care with gentle cleansing and use of Vaseline ointment to keep moist until healed. Handout was provided, and patient was instructed to return to clinic in 1-2 months if lesions do not completely resolve.    Candidal intertrigo + Pruritus  -     ketoconazole (NIZORAL) 2 % cream; Apply to affected areas of body  BID.  Dispense: 30 g; Refill: 3  -     triamcinolone acetonide 0.025% (KENALOG) 0.025 % cream; AAA of body bid  Dispense: 30 g; Refill: 3  -  Mix above with milk of magnesia  -  Blow dry on cool after shower.  -  Recommended Zeasorb AF powder to affected areas daily for maintenance.    Seborrheic keratosis  These are benign, inherited growths without a malignant potential. Reassurance given to patient. No treatment is necessary. Handout was provided.    Follow-up in about 3 weeks (around 9/28/2018), or if symptoms worsen or fail to improve.   - SCDs for DVT ppx  - Fall precautions  - PT eval

## 2019-07-28 NOTE — H&P ADULT - ASSESSMENT
This is an 83F with history as above who presents to the hospital with complaints of worsening fatigue. Also with mild hyponatremia and new small R sided pleural effusion.

## 2019-07-28 NOTE — DISCHARGE NOTE NURSING/CASE MANAGEMENT/SOCIAL WORK - NSDCPNINST_GEN_ALL_CORE
Pt A/Ox4, able to make needs known. Patient remains clinically stable throughout the shift. Patient medicated as per order. patient is in no acute distress, VSS, afebrile.

## 2019-07-28 NOTE — PROGRESS NOTE ADULT - SUBJECTIVE AND OBJECTIVE BOX
Ezequiellucia Miriam PGY 2  Pager: [phone]/ [phone]    Patient is a 83y old  Female who presents with a chief complaint of Worsening fatigue/generalized weakness (2019 02:59)      SUBJECTIVE / OVERNIGHT EVENTS:  Patient seen and examined at bedside. Patient reports she is feeling well this morning. She states that at home she has been very weak and fatigued since she fell, and had some shortness of breath with exertion. She has not had any falls since she left. She denies headaches, chest pain, difficulty breathing at rest, abd pain or dysuria.     Other Review of Systems Negative.    MEDICATIONS  (STANDING):  amLODIPine   Tablet 10 milliGRAM(s) Oral daily  aspirin  chewable 81 milliGRAM(s) Oral daily  buDESOnide 160 MICROgram(s)/formoterol 4.5 MICROgram(s) Inhaler 2 Puff(s) Inhalation two times a day  losartan 100 milliGRAM(s) Oral daily  metoprolol succinate  milliGRAM(s) Oral daily  sertraline 100 milliGRAM(s) Oral daily  traZODone 50 milliGRAM(s) Oral daily    MEDICATIONS  (PRN):  acetaminophen   Tablet .. 650 milliGRAM(s) Oral every 6 hours PRN Severe Pain (7 - 10)  ALPRAZolam 0.25 milliGRAM(s) Oral at bedtime PRN Anxiety      OBJECTIVE:    Vital Signs Last 24 Hrs  T(C): 36.6 (2019 07:20), Max: 36.8 (2019 14:41)  T(F): 97.8 (2019 07:20), Max: 98.3 (2019 14:41)  HR: 77 (2019 01:16) (70 - 98)  BP: 154/65 (2019 01:16) (138/60 - 178/78)  BP(mean): --  RR: 18 (2019 07:20) (16 - 18)  SpO2: 98% (2019 07:20) (96% - 98%)    CAPILLARY BLOOD GLUCOSE        I&O's Summary      PHYSICAL EXAM:  GENERAL: NAD, well-developed,  sitting in bed speaking in full sentences  HEAD:  Atraumatic, Normocephalic  EYES: EOMI, PERRLA, conjunctiva and sclera clear  NECK: Supple, No JVD  CHEST/LUNG: bilateral lower lobe coarse rhonchi;  L>>R  HEART: Regular rate and rhythm; No murmurs, rubs, or gallops  ABDOMEN: Soft, Nontender, Nondistended; Bowel sounds present  EXTREMITIES:  2+ Peripheral Pulses, No clubbing, cyanosis, or edema  PSYCH: AAOx3  NEUROLOGY: non-focal, CN II- XII intact; 5/5 strength in upper and lower extremities bilaterally; sensation grossly intact      LABS:                        12.1   7.16  )-----------( 371      ( 2019 06:40 )             33.8     Auto Eosinophil # x     / Auto Eosinophil % x     / Auto Neutrophil # x     / Auto Neutrophil % x     / BANDS % x                            11.8   9.33  )-----------( 358      ( 2019 16:55 )             33.6     Auto Eosinophil # 0.18  / Auto Eosinophil % 1.9   / Auto Neutrophil # 7.14  / Auto Neutrophil % 76.6  / BANDS % x        07    129<L>  |  95<L>  |  12  ----------------------------<  126<H>  4.1   |  23  |  0.53      129<L>  |  95<L>  |  14  ----------------------------<  187<H>  3.7   |  23  |  0.54      126<L>  |  92<L>  |  14  ----------------------------<  121<H>  4.4   |  22  |  0.50    Ca    9.1      2019 06:40  Mg     1.9       Phos  3.1       TPro  6.4  /  Alb  4.0  /  TBili  0.4  /  DBili  x   /  AST  20  /  ALT  19  /  AlkPhos  85            Urinalysis Basic - ( 2019 16:45 )    Color: COLORLESS / Appearance: CLEAR / S.007 / pH: 7.0  Gluc: NEGATIVE / Ketone: NEGATIVE  / Bili: NEGATIVE / Urobili: NORMAL   Blood: NEGATIVE / Protein: NEGATIVE / Nitrite: NEGATIVE   Leuk Esterase: NEGATIVE / RBC: x / WBC x   Sq Epi: x / Non Sq Epi: x / Bacteria: x      < from: CT Chest w/ IV Cont (19 @ 18:20) >  IMPRESSION:     Chest CT: Acute fractures of the right 9th, 10th and 11th ribs. No   pneumothorax or pleural effusion.  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 5 mm triangular nodule adjacent to the minor fissure the right   middle lobe is likely a intrafissural lymph node.    CT abdomen/pelvis: No posttraumatic sequela in the abdomen or pelvis.  2.6 x 1.6 cm indeterminate right adrenal nodule for which nonemergent   adrenal CT or MRI can be performed.    < end of copied text > Ezequiellucia Miriam PGY 2  Pager: [phone]/ [phone]    Patient is a 83y old  Female who presents with a chief complaint of Worsening fatigue/generalized weakness (2019 02:59)      SUBJECTIVE / OVERNIGHT EVENTS:  Patient seen and examined at bedside. Patient reports she is feeling well this morning. She states that at home she has been very weak and fatigued since she fell, and had some shortness of breath with exertion. She has not had any falls since she left. She denies headaches, chest pain, difficulty breathing at rest, abd pain or dysuria.     Other Review of Systems Negative.    MEDICATIONS  (STANDING):  amLODIPine   Tablet 10 milliGRAM(s) Oral daily  aspirin  chewable 81 milliGRAM(s) Oral daily  buDESOnide 160 MICROgram(s)/formoterol 4.5 MICROgram(s) Inhaler 2 Puff(s) Inhalation two times a day  losartan 100 milliGRAM(s) Oral daily  metoprolol succinate  milliGRAM(s) Oral daily  sertraline 100 milliGRAM(s) Oral daily  traZODone 50 milliGRAM(s) Oral daily    MEDICATIONS  (PRN):  acetaminophen   Tablet .. 650 milliGRAM(s) Oral every 6 hours PRN Severe Pain (7 - 10)  ALPRAZolam 0.25 milliGRAM(s) Oral at bedtime PRN Anxiety      OBJECTIVE:    Vital Signs Last 24 Hrs  T(C): 36.6 (2019 07:20), Max: 36.8 (2019 14:41)  T(F): 97.8 (2019 07:20), Max: 98.3 (2019 14:41)  HR: 77 (2019 01:16) (70 - 98)  BP: 154/65 (2019 01:16) (138/60 - 178/78)  BP(mean): --  RR: 18 (2019 07:20) (16 - 18)  SpO2: 98% (2019 07:20) (96% - 98%)    CAPILLARY BLOOD GLUCOSE        I&O's Summary      PHYSICAL EXAM:  GENERAL: NAD, well-developed,  sitting in bed speaking in full sentences  HEAD:  Atraumatic, Normocephalic  EYES: EOMI, PERRLA, conjunctiva and sclera clear  NECK: Supple, No JVD  CHEST/LUNG: bilateral lower lobe coarse rhonchi;  L>>R  HEART: Regular rate and rhythm; No murmurs, rubs, or gallops  ABDOMEN: Soft, Nontender, Nondistended; Bowel sounds present  EXTREMITIES:  2+ Peripheral Pulses, No clubbing, cyanosis, or edema  PSYCH: AAOx3  NEUROLOGY: non-focal, CN II- XII intact; 5/5 strength in upper and lower extremities bilaterally; sensation grossly intact      LABS:                        12.1   7.16  )-----------( 371      ( 2019 06:40 )             33.8     Auto Eosinophil # x     / Auto Eosinophil % x     / Auto Neutrophil # x     / Auto Neutrophil % x     / BANDS % x                            11.8   9.33  )-----------( 358      ( 2019 16:55 )             33.6     Auto Eosinophil # 0.18  / Auto Eosinophil % 1.9   / Auto Neutrophil # 7.14  / Auto Neutrophil % 76.6  / BANDS % x        07    129<L>  |  95<L>  |  12  ----------------------------<  126<H>  4.1   |  23  |  0.53      129<L>  |  95<L>  |  14  ----------------------------<  187<H>  3.7   |  23  |  0.54      126<L>  |  92<L>  |  14  ----------------------------<  121<H>  4.4   |  22  |  0.50    Ca    9.1      2019 06:40  Mg     1.9       Phos  3.1       TPro  6.4  /  Alb  4.0  /  TBili  0.4  /  DBili  x   /  AST  20  /  ALT  19  /  AlkPhos  85            Urinalysis Basic - ( 2019 16:45 )    Color: COLORLESS / Appearance: CLEAR / S.007 / pH: 7.0  Gluc: NEGATIVE / Ketone: NEGATIVE  / Bili: NEGATIVE / Urobili: NORMAL   Blood: NEGATIVE / Protein: NEGATIVE / Nitrite: NEGATIVE   Leuk Esterase: NEGATIVE / RBC: x / WBC x   Sq Epi: x / Non Sq Epi: x / Bacteria: x    < from: Xray Chest 2 Views PA/Lat (19 @ 18:16) >  IMPRESSION:   Small right-sided pleural effusion which is new compared to study from   2019.  Redemonstration of right lower rib fractures as well as bilateral lower   lung scarring and bronchiectasis.      < end of copied text >    < from: CT Chest w/ IV Cont (19 @ 18:20) >  IMPRESSION:     Chest CT: Acute fractures of the right 9th, 10th and 11th ribs. No   pneumothorax or pleural effusion.  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 5 mm triangular nodule adjacent to the minor fissure the right   middle lobe is likely a intrafissural lymph node.    CT abdomen/pelvis: No posttraumatic sequela in the abdomen or pelvis.  2.6 x 1.6 cm indeterminate right adrenal nodule for which nonemergent   adrenal CT or MRI can be performed.    < end of copied text >

## 2019-07-28 NOTE — H&P ADULT - PROBLEM SELECTOR PLAN 1
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  - Will also check orthostatic vitals as patient c/o dizziness with standing

## 2019-07-28 NOTE — DISCHARGE NOTE NURSING/CASE MANAGEMENT/SOCIAL WORK - NSDCDPATPORTLINK_GEN_ALL_CORE
You can access the [facility] Portal, offered by [facility], by registering with the following website: [email or web address]

## 2019-07-28 NOTE — H&P ADULT - PROBLEM SELECTOR PLAN 2
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

## 2019-07-28 NOTE — H&P ADULT - NSHPSOCIALHISTORY_GEN_ALL_CORE
Lives in a 2 wilmer house on the bottom level  Ambulates independently  Denies any tobacco, EtOH, or illicit substance use

## 2019-07-28 NOTE — PROGRESS NOTE ADULT - PROBLEM SELECTOR PLAN 6
- c/w home sertraline, trazodone, and decreased dose of alprazolam prn (Reference #: [id number]), holding ambien for now
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
## 2019-07-28 NOTE — H&P ADULT - NSHPLABSRESULTS_GEN_ALL_CORE
LABS and ADDITIONAL STUDIES:                        11.8   9.33  )-----------( 358      ( 2019 16:55 )             33.6         129<L>  |  95<L>  |  14  ----------------------------<  187<H>  3.7   |  23  |  0.54    Ca    9.0      2019 21:21  Phos  2.5       Mg     1.7         TPro  6.4  /  Alb  4.0  /  TBili  0.4  /  DBili  x   /  AST  20  /  ALT  19  /  AlkPhos  85      LIVER FUNCTIONS - ( 2019 16:55 )  Alb: 4.0 g/dL / Pro: 6.4 g/dL / ALK PHOS: 85 u/L / ALT: 19 u/L / AST: 20 u/L / GGT: x           Urinalysis Basic - ( 2019 16:45 )  Color: COLORLESS / Appearance: CLEAR / S.007 / pH: 7.0  Gluc: NEGATIVE / Ketone: NEGATIVE  / Bili: NEGATIVE / Urobili: NORMAL   Blood: NEGATIVE / Protein: NEGATIVE / Nitrite: NEGATIVE   Leuk Esterase: NEGATIVE / RBC: x / WBC x   Sq Epi: x / Non Sq Epi: x / Bacteria: x    Troponin T, High Sensitivity (19 @ 16:55)    Troponin T, High Sensitivity: 10  Troponin T, High Sensitivity (19 @ 19:30)    Troponin T, High Sensitivity: 11    Osmolality, Serum (19 @ 18:20)    Osmolality, Serum: 267 mosmo/kg  Osmolality, Random Urine (19 @ 18:20)    Osmolality, Random Urine: 223 mosmo/kg  Sodium, Random Urine (19 @ 18:20)    Sodium, Random Urine: 37: Reference range not established for this test mmol/L    < from: Xray Chest 2 Views PA/Lat (19 @ 18:16) >  ******PRELIMINARY REPORT******        INTERPRETATION:  Small right pleural effusion, new from 2019.   Redemonstration of right lower rib fractures and bilateral lower lung   scarring and bronchiectasis.    < end of copied text >    EKG - NSR at 88, nl axis, QTc 404, no significant ST-T wave changes

## 2019-07-28 NOTE — DISCHARGE NOTE PROVIDER - HOSPITAL COURSE
83F with history of HTN, Bronchiectasis, and Anxiety who presents to the hospital with complaints of worsening fatigue/generalized weakness since her recent fall. As per chart review, the patient had a recent fall earlier in July where she fell after tripping of a rug. Had suffered fractures of the R lower ribs and was transferred to [facility] for a trauma eval. There she was noted to be stable and was discharged on July 7th to home (no PT needs as per PT eval). As per son, since coming home, the patient has become more tired and fatigued. Said that she has been compliant with her incentive spirometer and has been able to keep up her PO intake but she is still weak. Said that the patient also gets dizzy when she goes to stand up from laying or sitting down. He denies any worsening of the chest pain from her rib fractures, in fact said that the patient has not required any medications for pain in a while. He and the patient deny any fevers, chills or other infectious complaints. They said that her alprazolam and zolpidem doses were reduced in half recently by her psychiatrist as she was worried that the patient's fall might have been due to them. Otherwise no other complaints.         On arrival to the ED, her vitals were T 98.3, P 98, /78, RR 18, O2 sat 96% RA. Her lab work was significant for mild hyponatremia that improved in the ED without interventions. She had a CXR done that showed a new R sided small pleural effusion. She was then admitted to medicine.             On HD 2, patient reported she was feeling back to baseline. She was able to ambulate without assistance up and down the hallway without any shortness of breath, and asked to be discharged home. She was also found to have hyponatremia, which may be 2/2 to SIADH. Son reports family had been pushing her to drink more water over the last few days for fear that she was becoming dehydrated. Patient placed on fluid restriction and Na came up from 126-->129. Patient deferring any rehab or PT eval at this time. She will be discharged home with script for outpatient PT, and close follow up with her PCP for repeat blood work and her pulmonologist for repeat CXR for pleural effusion.

## 2019-07-28 NOTE — H&P ADULT - HISTORY OF PRESENT ILLNESS
This is an 83F with history of HTN, Bronchiectasis, and Anxiety who presents to the hospital with complaints of worsening fatigue/generalized weakness since her recent fall. As per chart review, the patient had a recent fall earlier in July where she fell after tripping of a rug. Had suffered fractures of the R lower ribs and was transferred to [facility] for a trauma eval. There she was noted to be stable and was discharged on July 7th to home (no PT needs as per PT eval). This is an 83F with history of HTN, Bronchiectasis, and Anxiety who presents to the hospital with complaints of worsening fatigue/generalized weakness since her recent fall. As per chart review, the patient had a recent fall earlier in July where she fell after tripping of a rug. Had suffered fractures of the R lower ribs and was transferred to [facility] for a trauma eval. There she was noted to be stable and was discharged on July 7th to home (no PT needs as per PT eval). As per son, since coming home, the patient has become more tired and fatigued. Said that she has been compliant with her incentive spirometer and has been able to keep up her PO intake but she is still weak. Said that the patient also gets dizzy when she goes to stand up from laying or sitting down. He denies any worsening of the chest pain from her rib fractures, in fact said that the patient has not required any medications for pain in a while. He and the patient deny any fevers, chills or other infectious complaints. They said that her alprazolam and zolpidem doses were reduced in half recently by her psychiatrist as she was worried that the patient's fall might have been due to them. Otherwise no other complaints.     On arrival to the ED, her vitals were T 98.3, P 98, /78, RR 18, O2 sat 96% RA. Her lab work was significant for mild hyponatremia that improved in the ED without interventions. She had a CXR done that showed a new R sided small pleural effusion. She was then admitted to medicine.

## 2020-01-26 NOTE — ED CDU PROVIDER SUBSEQUENT DAY NOTE - CPE EDP RESP NORM
Documentation clarification is required for accuracy in coding and billing, claim validation and reporting severity of illness, quality data and risk of mortality.
normal...

## 2020-08-24 ENCOUNTER — INPATIENT (INPATIENT)
Facility: HOSPITAL | Age: 85
LOS: 4 days | Discharge: HOME | End: 2020-08-29
Attending: STUDENT IN AN ORGANIZED HEALTH CARE EDUCATION/TRAINING PROGRAM | Admitting: STUDENT IN AN ORGANIZED HEALTH CARE EDUCATION/TRAINING PROGRAM
Payer: MEDICARE

## 2020-08-24 VITALS
TEMPERATURE: 97 F | SYSTOLIC BLOOD PRESSURE: 160 MMHG | DIASTOLIC BLOOD PRESSURE: 66 MMHG | RESPIRATION RATE: 16 BRPM | OXYGEN SATURATION: 95 % | HEART RATE: 75 BPM

## 2020-08-24 DIAGNOSIS — E87.1 HYPO-OSMOLALITY AND HYPONATREMIA: ICD-10-CM

## 2020-08-24 DIAGNOSIS — Z90.49 ACQUIRED ABSENCE OF OTHER SPECIFIED PARTS OF DIGESTIVE TRACT: Chronic | ICD-10-CM

## 2020-08-24 DIAGNOSIS — I10 ESSENTIAL (PRIMARY) HYPERTENSION: ICD-10-CM

## 2020-08-24 DIAGNOSIS — F41.9 ANXIETY DISORDER, UNSPECIFIED: ICD-10-CM

## 2020-08-24 DIAGNOSIS — J47.1 BRONCHIECTASIS WITH (ACUTE) EXACERBATION: ICD-10-CM

## 2020-08-24 DIAGNOSIS — Z29.9 ENCOUNTER FOR PROPHYLACTIC MEASURES, UNSPECIFIED: ICD-10-CM

## 2020-08-24 LAB
ALBUMIN SERPL ELPH-MCNC: 4.4 G/DL — SIGNIFICANT CHANGE UP (ref 3.3–5)
ALP SERPL-CCNC: 88 U/L — SIGNIFICANT CHANGE UP (ref 40–120)
ALT FLD-CCNC: 39 U/L — HIGH (ref 4–33)
ANION GAP SERPL CALC-SCNC: 12 MMO/L — SIGNIFICANT CHANGE UP (ref 7–14)
APPEARANCE UR: CLEAR — SIGNIFICANT CHANGE UP
AST SERPL-CCNC: 42 U/L — HIGH (ref 4–32)
BASOPHILS # BLD AUTO: 0.03 K/UL — SIGNIFICANT CHANGE UP (ref 0–0.2)
BASOPHILS NFR BLD AUTO: 0.2 % — SIGNIFICANT CHANGE UP (ref 0–2)
BILIRUB SERPL-MCNC: 0.7 MG/DL — SIGNIFICANT CHANGE UP (ref 0.2–1.2)
BILIRUB UR-MCNC: NEGATIVE — SIGNIFICANT CHANGE UP
BLOOD UR QL VISUAL: NEGATIVE — SIGNIFICANT CHANGE UP
BUN SERPL-MCNC: 15 MG/DL — SIGNIFICANT CHANGE UP (ref 7–23)
CA-I BLD-SCNC: 1.12 MMOL/L — SIGNIFICANT CHANGE UP (ref 1.03–1.23)
CALCIUM SERPL-MCNC: 9.4 MG/DL — SIGNIFICANT CHANGE UP (ref 8.4–10.5)
CHLORIDE SERPL-SCNC: 90 MMOL/L — LOW (ref 98–107)
CHLORIDE UR-SCNC: 90 MMOL/L — SIGNIFICANT CHANGE UP
CO2 SERPL-SCNC: 24 MMOL/L — SIGNIFICANT CHANGE UP (ref 22–31)
COLOR SPEC: COLORLESS — SIGNIFICANT CHANGE UP
CREAT SERPL-MCNC: 0.65 MG/DL — SIGNIFICANT CHANGE UP (ref 0.5–1.3)
EOSINOPHIL # BLD AUTO: 0.01 K/UL — SIGNIFICANT CHANGE UP (ref 0–0.5)
EOSINOPHIL NFR BLD AUTO: 0.1 % — SIGNIFICANT CHANGE UP (ref 0–6)
GLUCOSE SERPL-MCNC: 142 MG/DL — HIGH (ref 70–99)
GLUCOSE UR-MCNC: NEGATIVE — SIGNIFICANT CHANGE UP
HCT VFR BLD CALC: 34.8 % — SIGNIFICANT CHANGE UP (ref 34.5–45)
HGB BLD-MCNC: 12.8 G/DL — SIGNIFICANT CHANGE UP (ref 11.5–15.5)
IMM GRANULOCYTES NFR BLD AUTO: 1.2 % — SIGNIFICANT CHANGE UP (ref 0–1.5)
KETONES UR-MCNC: NEGATIVE — SIGNIFICANT CHANGE UP
LEUKOCYTE ESTERASE UR-ACNC: NEGATIVE — SIGNIFICANT CHANGE UP
LYMPHOCYTES # BLD AUTO: 1.5 K/UL — SIGNIFICANT CHANGE UP (ref 1–3.3)
LYMPHOCYTES # BLD AUTO: 12.3 % — LOW (ref 13–44)
MAGNESIUM SERPL-MCNC: 2 MG/DL — SIGNIFICANT CHANGE UP (ref 1.6–2.6)
MCHC RBC-ENTMCNC: 30.7 PG — SIGNIFICANT CHANGE UP (ref 27–34)
MCHC RBC-ENTMCNC: 36.8 % — HIGH (ref 32–36)
MCV RBC AUTO: 83.5 FL — SIGNIFICANT CHANGE UP (ref 80–100)
MONOCYTES # BLD AUTO: 1.08 K/UL — HIGH (ref 0–0.9)
MONOCYTES NFR BLD AUTO: 8.9 % — SIGNIFICANT CHANGE UP (ref 2–14)
NEUTROPHILS # BLD AUTO: 9.41 K/UL — HIGH (ref 1.8–7.4)
NEUTROPHILS NFR BLD AUTO: 77.3 % — HIGH (ref 43–77)
NITRITE UR-MCNC: NEGATIVE — SIGNIFICANT CHANGE UP
NRBC # FLD: 0 K/UL — SIGNIFICANT CHANGE UP (ref 0–0)
OSMOLALITY SERPL: 272 MOSMO/KG — LOW (ref 275–295)
OSMOLALITY UR: 298 MOSMO/KG — SIGNIFICANT CHANGE UP (ref 50–1200)
PH UR: 7 — SIGNIFICANT CHANGE UP (ref 5–8)
PLATELET # BLD AUTO: 378 K/UL — SIGNIFICANT CHANGE UP (ref 150–400)
PMV BLD: 8.2 FL — SIGNIFICANT CHANGE UP (ref 7–13)
POTASSIUM SERPL-MCNC: 4.1 MMOL/L — SIGNIFICANT CHANGE UP (ref 3.5–5.3)
POTASSIUM SERPL-SCNC: 4.1 MMOL/L — SIGNIFICANT CHANGE UP (ref 3.5–5.3)
POTASSIUM UR-SCNC: 23.2 MMOL/L — SIGNIFICANT CHANGE UP
PROT SERPL-MCNC: 7.2 G/DL — SIGNIFICANT CHANGE UP (ref 6–8.3)
PROT UR-MCNC: NEGATIVE — SIGNIFICANT CHANGE UP
RBC # BLD: 4.17 M/UL — SIGNIFICANT CHANGE UP (ref 3.8–5.2)
RBC # FLD: 12.5 % — SIGNIFICANT CHANGE UP (ref 10.3–14.5)
SODIUM SERPL-SCNC: 126 MMOL/L — LOW (ref 135–145)
SODIUM UR-SCNC: 92 MMOL/L — SIGNIFICANT CHANGE UP
SP GR SPEC: 1.01 — SIGNIFICANT CHANGE UP (ref 1–1.04)
TSH SERPL-MCNC: 1.2 UIU/ML — SIGNIFICANT CHANGE UP (ref 0.27–4.2)
UROBILINOGEN FLD QL: NORMAL — SIGNIFICANT CHANGE UP
UUN UR-MCNC: 251 MG/DL — SIGNIFICANT CHANGE UP
WBC # BLD: 12.17 K/UL — HIGH (ref 3.8–10.5)
WBC # FLD AUTO: 12.17 K/UL — HIGH (ref 3.8–10.5)

## 2020-08-24 PROCEDURE — 71045 X-RAY EXAM CHEST 1 VIEW: CPT | Mod: 26

## 2020-08-24 PROCEDURE — 99223 1ST HOSP IP/OBS HIGH 75: CPT

## 2020-08-24 PROCEDURE — 99285 EMERGENCY DEPT VISIT HI MDM: CPT

## 2020-08-24 RX ORDER — SERTRALINE 25 MG/1
1 TABLET, FILM COATED ORAL
Qty: 0 | Refills: 0 | DISCHARGE

## 2020-08-24 RX ORDER — MULTIVIT-MIN/FERROUS GLUCONATE 9 MG/15 ML
1 LIQUID (ML) ORAL
Qty: 0 | Refills: 0 | DISCHARGE

## 2020-08-24 RX ORDER — ALPRAZOLAM 0.25 MG
0.5 TABLET ORAL
Qty: 0 | Refills: 0 | DISCHARGE

## 2020-08-24 RX ORDER — ALBUTEROL 90 UG/1
2 AEROSOL, METERED ORAL EVERY 6 HOURS
Refills: 0 | Status: DISCONTINUED | OUTPATIENT
Start: 2020-08-24 | End: 2020-08-29

## 2020-08-24 RX ORDER — AMLODIPINE BESYLATE 2.5 MG/1
10 TABLET ORAL DAILY
Refills: 0 | Status: DISCONTINUED | OUTPATIENT
Start: 2020-08-24 | End: 2020-08-28

## 2020-08-24 RX ORDER — BUDESONIDE AND FORMOTEROL FUMARATE DIHYDRATE 160; 4.5 UG/1; UG/1
2 AEROSOL RESPIRATORY (INHALATION)
Refills: 0 | Status: DISCONTINUED | OUTPATIENT
Start: 2020-08-24 | End: 2020-08-29

## 2020-08-24 RX ORDER — HEPARIN SODIUM 5000 [USP'U]/ML
5000 INJECTION INTRAVENOUS; SUBCUTANEOUS EVERY 8 HOURS
Refills: 0 | Status: DISCONTINUED | OUTPATIENT
Start: 2020-08-24 | End: 2020-08-29

## 2020-08-24 RX ORDER — CEFTRIAXONE 500 MG/1
1000 INJECTION, POWDER, FOR SOLUTION INTRAMUSCULAR; INTRAVENOUS EVERY 24 HOURS
Refills: 0 | Status: DISCONTINUED | OUTPATIENT
Start: 2020-08-24 | End: 2020-08-25

## 2020-08-24 RX ORDER — ZOLPIDEM TARTRATE 10 MG/1
0.5 TABLET ORAL
Qty: 0 | Refills: 0 | DISCHARGE

## 2020-08-24 RX ORDER — ONDANSETRON 8 MG/1
4 TABLET, FILM COATED ORAL EVERY 6 HOURS
Refills: 0 | Status: DISCONTINUED | OUTPATIENT
Start: 2020-08-24 | End: 2020-08-24

## 2020-08-24 RX ORDER — SERTRALINE 25 MG/1
25 TABLET, FILM COATED ORAL AT BEDTIME
Refills: 0 | Status: DISCONTINUED | OUTPATIENT
Start: 2020-08-24 | End: 2020-08-28

## 2020-08-24 RX ORDER — LOSARTAN POTASSIUM 100 MG/1
100 TABLET, FILM COATED ORAL DAILY
Refills: 0 | Status: DISCONTINUED | OUTPATIENT
Start: 2020-08-24 | End: 2020-08-29

## 2020-08-24 RX ORDER — METOPROLOL TARTRATE 50 MG
100 TABLET ORAL DAILY
Refills: 0 | Status: DISCONTINUED | OUTPATIENT
Start: 2020-08-24 | End: 2020-08-29

## 2020-08-24 RX ORDER — TRAZODONE HCL 50 MG
0 TABLET ORAL
Qty: 0 | Refills: 0 | DISCHARGE

## 2020-08-24 RX ORDER — AZITHROMYCIN 500 MG/1
500 TABLET, FILM COATED ORAL DAILY
Refills: 0 | Status: DISCONTINUED | OUTPATIENT
Start: 2020-08-24 | End: 2020-08-25

## 2020-08-24 RX ORDER — ASPIRIN/CALCIUM CARB/MAGNESIUM 324 MG
81 TABLET ORAL DAILY
Refills: 0 | Status: DISCONTINUED | OUTPATIENT
Start: 2020-08-24 | End: 2020-08-29

## 2020-08-24 RX ADMIN — SERTRALINE 25 MILLIGRAM(S): 25 TABLET, FILM COATED ORAL at 23:43

## 2020-08-24 RX ADMIN — AMLODIPINE BESYLATE 10 MILLIGRAM(S): 2.5 TABLET ORAL at 23:01

## 2020-08-24 RX ADMIN — AZITHROMYCIN 500 MILLIGRAM(S): 500 TABLET, FILM COATED ORAL at 23:48

## 2020-08-24 RX ADMIN — CEFTRIAXONE 100 MILLIGRAM(S): 500 INJECTION, POWDER, FOR SOLUTION INTRAMUSCULAR; INTRAVENOUS at 23:44

## 2020-08-24 NOTE — H&P ADULT - PROBLEM SELECTOR PLAN 2
Has history of bronchiectasis.  Now with possible pneumonia or bronchiectasis exacerbation.  -Treated recently with doxycycline but will change to ctx/azithro  -Complete outpatient steroid taper as well.

## 2020-08-24 NOTE — ED ADULT TRIAGE NOTE - CHIEF COMPLAINT QUOTE
Pt arrives sent in by PMD for abnormal lab result. Pt states she has been feeling fatigued and headache x1-2 weeks, had blood drawn on Friday and was called back to come to ED for low sodium level. PMHx HTN, Bronchiectasis

## 2020-08-24 NOTE — H&P ADULT - NSHPLABSRESULTS_GEN_ALL_CORE
Diagnostics reviewed and remarkable for:  CBC w/ WBC 12.17  CMP w/ Na 126, Cl 90, glucose 142, AST 42, ALT 39  ICa 1.12, Mg 2.0, TSH 1.20, SOsm 272  Junito 92, UOsm 298  UA w/ neg Nit/LE  COVID pending  CXR personally reviewed and possible opacity in left lower lobe, retrocardiac space.

## 2020-08-24 NOTE — ED ADULT NURSE NOTE - OBJECTIVE STATEMENT
Prince RN: 83 y/o F received to room 26 c/o abnormal lab results. Pt a&ox4 and ambulatory. Pt states she was at her PCP and was told to go to ED for low sodium Pt states she is unsure how low. Pt states she had a ha 5/10 earlier today that has since resided. Pt respirations even and unlabored. Pt abdomen soft nontender nondistended. Vital signs aS noted, call bell in md sai evaluated, comfort measures provided, 20G IV placed R AC, will give report to primary RN.

## 2020-08-24 NOTE — H&P ADULT - NSHPPHYSICALEXAM_GEN_ALL_CORE
Physical exam:  Vital signs reviewed as below:  T(C): 36.6 (08-24-20 @ 20:55), Max: 36.6 (08-24-20 @ 20:55)  HR: 78 (08-24-20 @ 20:55) (75 - 78)  BP: 172/84 (08-24-20 @ 20:55) (160/66 - 172/84)  RR: 16 (08-24-20 @ 20:55) (16 - 16)  SpO2: 97% (08-24-20 @ 20:55) (95% - 97%)  Constitutional-awake, alert, not in acute distress  Neuro-awake, alert, appropriately interactive, moving all extremities,   face symmetric  Eyes-pupils equally round and reactive to light, non icteric, no discharge noted  Ears, nose, mouth, throat-no abnormal discharge, moist mucous membranes, oropharynx clear without noted exudate  CV-regular rate and rhythm, no rubs, murmurs, gallops appreciated, 1+ b/l lower extremity edema  Resp-few crackles left lower lobe  GI-abdomen soft and nontender, no rebound or guarding present  -not examined  MSK-normal range of motion of bilateral elbows and knees, no obvious deformities   Skin-warm, dry, no rash appreciated  Psych-calm, cooperative, normal affect, not attending to internal stimuli

## 2020-08-24 NOTE — ED PROVIDER NOTE - ATTENDING CONTRIBUTION TO CARE
DR. RECIO, ATTENDING MD-  I have reviewed and discussed the medical student’s documentation and findings with the student. After personally examining the patient, my findings have been added to this documentation.    DR. RECIO, ATTENDING MD-  I performed a face to face bedside interview with the patient regarding history of present illness, review of symptoms and past medical history. I completed an independent physical exam.  I have discussed the patient's plan of care with the resident.   Documentation as above in the note.    83 y/o female h/o htn bronchiectasis sent in by pcp for hyponatremia on labs drawn Friday.  Pt states she has felt fatigued with mild ha x1-2 weeks.  Gradually worsening.  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  Evaluate for lyte abn, anemia, occult infection.  Obtain cbc cmp ua ucx urine osm urine lytes cxr ekg likely admission.

## 2020-08-24 NOTE — ED PROVIDER NOTE - PMH
Anxiety    Bronchiectasis    HTN (hypertension)    Major depressive disorder, remission status unspecified, unspecified whether recurrent

## 2020-08-24 NOTE — ED PROVIDER NOTE - OBJECTIVE STATEMENT
Patient is a 70 y/o female with a pmhx of bronchiectasis and hypertension whose PCP called her and reported a result of hyponatremia from blood drawn 3 days ago urging her to come to the ER. Patient is a 72 y/o female with a pmhx of bronchiectasis and hypertension whose PCP called her and reported a result of hyponatremia from blood drawn 3 days ago urging her to come to the ER. This morning the patient admits to a headache but it resolved before she came in. The patient does admit to SOB & coughing with green mucous and she is on day 5 of a course of doxycline for what she said was related to her lungs. She also admits to urinating more frequently than normal. She denies any fever, chills, diaphoresis, LOC, change in bowel habits, sensation dysfunction, or trouble moving.     Physician: Dr Preet Lugo  Pharmacy: [facility] [address] [place], [address]

## 2020-08-24 NOTE — H&P ADULT - HISTORY OF PRESENT ILLNESS
Colleen Santana is an 84 year old woman with a history of bronchiectasis, anxiety, HTN who presents for hyponatremia.    She has been having some shortness of breath and cough productive of mucous for the past week or so.  She was prescribed doxycycline and a prednisone taper for this by her outpatient physician.  On labs drawn three days ago, she was found to be hyponatremic and was advised to present to [facility] for evaluation today.  She endorses a mild short lived headache this morning but denies fevers, chills, chest pain, dysuria, diaphoresis, n/v/d/constipation.      In the ED, she was afebrile with unremarkable vital signs.  Diagnostics revealed Na of 126, normal TSH, UOsm>SOsm, and urine sodium of 92.  She was admitted for further management.    On evaluation, she gave the above history.  She reports she drinks a lot of water at home but was unable to quantify specifically, but did report 4-5 500mL bottles at least and she has also been urinating more.

## 2020-08-24 NOTE — H&P ADULT - NSHPREVIEWOFSYSTEMS_GEN_ALL_CORE
ROS:  Constitutional:  (+) none; (-) fevers, chills, sweats, unintentional weight loss, fatigue, sick contacts, recent travel, trauma  Ears/Nose/Mouth/Throat: (+) none; (-) throat pain, rhinorrhea, dysphagia/odynophagia  CV: (+) none; (-) chest pain, palpitations, lower extremity edema, orthopnea, PND  Resp: (+) shortness of breath, cough (-) hemoptysis  GI: (+) none; (-) abdominal pain, nausea, vomiting, diarrhea, constipation, melana, hematochezia  : (+) none; (-) dysuria, hematuria  MSK: (+) none; (-) joint pain, joint swelling  Skin: (+) none; (-) rash, new yellowing/darkening of skin  Neuro: (+) HA, (-) vision changes, weakness, confusion, lightheadedness/dizziness  Endo: (+) none; (-) heat/cold intolerance, recent skin/hair/nail changes  Heme/Lymph: (+) none; (-) swollen lymph nodes, night sweats

## 2020-08-24 NOTE — ED PROVIDER NOTE - CLINICAL SUMMARY MEDICAL DECISION MAKING FREE TEXT BOX
70 y/o F with a pmhx of bronchiectasis & hypertension that came to ED per PCP recommendation from hyponatremic reading. She is being water restricted and a CBC, CMP, UA 70 y/o F with a pmhx of bronchiectasis & hypertension that came to ED per PCP recommendation from hyponatremic reading. She is being water restricted and a CBC, CMP, UA. Na 126, moderate hypoNa, will admit to inpatient for monitoring and correction. 72 y/o F with a pmhx of bronchiectasis & hypertension that came to ED per PCP recommendation from hyponatremic reading. She is being water restricted and a CBC, CMP, UA. Na 126, moderate hypoNa, will admit to inpatient for monitoring and correction. CXR Pending

## 2020-08-24 NOTE — H&P ADULT - NSICDXPASTMEDICALHX_GEN_ALL_CORE_FT
PAST MEDICAL HISTORY:  Anxiety     Bronchiectasis     HTN (hypertension)     Major depressive disorder, remission status unspecified, unspecified whether recurrent

## 2020-08-24 NOTE — H&P ADULT - PROBLEM SELECTOR PLAN 1
-Likely SIADH given urine studies and prior history of SIADH.  -On sertraline low dose but also with pulmonary history possibly contributing to SIAHD.  Also with possible pneumonia.  -Fluid restrict, monitor I&Os -Likely SIADH given urine studies and prior history of SIADH.  -On sertraline low dose but also with pulmonary history possibly contributing to SIAHD.  Also with possible pneumonia.  -Fluid restrict, monitor I&Os  -Target <6-8 meq/24 hour correction

## 2020-08-25 LAB
ANION GAP SERPL CALC-SCNC: 12 MMO/L — SIGNIFICANT CHANGE UP (ref 7–14)
ANION GAP SERPL CALC-SCNC: 17 MMO/L — HIGH (ref 7–14)
BUN SERPL-MCNC: 12 MG/DL — SIGNIFICANT CHANGE UP (ref 7–23)
BUN SERPL-MCNC: 15 MG/DL — SIGNIFICANT CHANGE UP (ref 7–23)
CALCIUM SERPL-MCNC: 9 MG/DL — SIGNIFICANT CHANGE UP (ref 8.4–10.5)
CALCIUM SERPL-MCNC: 9.3 MG/DL — SIGNIFICANT CHANGE UP (ref 8.4–10.5)
CHLORIDE SERPL-SCNC: 85 MMOL/L — LOW (ref 98–107)
CHLORIDE SERPL-SCNC: 89 MMOL/L — LOW (ref 98–107)
CO2 SERPL-SCNC: 22 MMOL/L — SIGNIFICANT CHANGE UP (ref 22–31)
CO2 SERPL-SCNC: 24 MMOL/L — SIGNIFICANT CHANGE UP (ref 22–31)
CREAT SERPL-MCNC: 0.49 MG/DL — LOW (ref 0.5–1.3)
CREAT SERPL-MCNC: 0.54 MG/DL — SIGNIFICANT CHANGE UP (ref 0.5–1.3)
GLUCOSE SERPL-MCNC: 147 MG/DL — HIGH (ref 70–99)
GLUCOSE SERPL-MCNC: 176 MG/DL — HIGH (ref 70–99)
GRAM STN FLD: SIGNIFICANT CHANGE UP
HBA1C BLD-MCNC: 6.1 % — HIGH (ref 4–5.6)
HCT VFR BLD CALC: 37.6 % — SIGNIFICANT CHANGE UP (ref 34.5–45)
HGB BLD-MCNC: 13.4 G/DL — SIGNIFICANT CHANGE UP (ref 11.5–15.5)
L PNEUMO AG UR QL: NEGATIVE — SIGNIFICANT CHANGE UP
MAGNESIUM SERPL-MCNC: 1.9 MG/DL — SIGNIFICANT CHANGE UP (ref 1.6–2.6)
MCHC RBC-ENTMCNC: 29.8 PG — SIGNIFICANT CHANGE UP (ref 27–34)
MCHC RBC-ENTMCNC: 35.6 % — SIGNIFICANT CHANGE UP (ref 32–36)
MCV RBC AUTO: 83.7 FL — SIGNIFICANT CHANGE UP (ref 80–100)
NRBC # FLD: 0 K/UL — SIGNIFICANT CHANGE UP (ref 0–0)
NT-PROBNP SERPL-SCNC: 501.1 PG/ML — SIGNIFICANT CHANGE UP
PHOSPHATE SERPL-MCNC: 3.2 MG/DL — SIGNIFICANT CHANGE UP (ref 2.5–4.5)
PLATELET # BLD AUTO: 452 K/UL — HIGH (ref 150–400)
PMV BLD: SIGNIFICANT CHANGE UP FL (ref 7–13)
POTASSIUM SERPL-MCNC: 4 MMOL/L — SIGNIFICANT CHANGE UP (ref 3.5–5.3)
POTASSIUM SERPL-MCNC: 4.1 MMOL/L — SIGNIFICANT CHANGE UP (ref 3.5–5.3)
POTASSIUM SERPL-SCNC: 4 MMOL/L — SIGNIFICANT CHANGE UP (ref 3.5–5.3)
POTASSIUM SERPL-SCNC: 4.1 MMOL/L — SIGNIFICANT CHANGE UP (ref 3.5–5.3)
RBC # BLD: 4.49 M/UL — SIGNIFICANT CHANGE UP (ref 3.8–5.2)
RBC # FLD: 12.2 % — SIGNIFICANT CHANGE UP (ref 10.3–14.5)
SARS-COV-2 IGG SERPL QL IA: NEGATIVE — SIGNIFICANT CHANGE UP
SARS-COV-2 IGM SERPL IA-ACNC: <0.1 INDEX — SIGNIFICANT CHANGE UP
SARS-COV-2 RNA SPEC QL NAA+PROBE: SIGNIFICANT CHANGE UP
SODIUM SERPL-SCNC: 124 MMOL/L — LOW (ref 135–145)
SODIUM SERPL-SCNC: 125 MMOL/L — LOW (ref 135–145)
SPECIMEN SOURCE: SIGNIFICANT CHANGE UP
WBC # BLD: 18.77 K/UL — HIGH (ref 3.8–10.5)
WBC # FLD AUTO: 18.77 K/UL — HIGH (ref 3.8–10.5)

## 2020-08-25 PROCEDURE — 93010 ELECTROCARDIOGRAM REPORT: CPT

## 2020-08-25 PROCEDURE — 99233 SBSQ HOSP IP/OBS HIGH 50: CPT | Mod: GC

## 2020-08-25 RX ADMIN — HEPARIN SODIUM 5000 UNIT(S): 5000 INJECTION INTRAVENOUS; SUBCUTANEOUS at 21:41

## 2020-08-25 RX ADMIN — Medication 81 MILLIGRAM(S): at 13:31

## 2020-08-25 RX ADMIN — HEPARIN SODIUM 5000 UNIT(S): 5000 INJECTION INTRAVENOUS; SUBCUTANEOUS at 06:53

## 2020-08-25 RX ADMIN — AMLODIPINE BESYLATE 10 MILLIGRAM(S): 2.5 TABLET ORAL at 13:31

## 2020-08-25 RX ADMIN — HEPARIN SODIUM 5000 UNIT(S): 5000 INJECTION INTRAVENOUS; SUBCUTANEOUS at 13:31

## 2020-08-25 RX ADMIN — SERTRALINE 25 MILLIGRAM(S): 25 TABLET, FILM COATED ORAL at 21:41

## 2020-08-25 RX ADMIN — BUDESONIDE AND FORMOTEROL FUMARATE DIHYDRATE 2 PUFF(S): 160; 4.5 AEROSOL RESPIRATORY (INHALATION) at 21:40

## 2020-08-25 RX ADMIN — LOSARTAN POTASSIUM 100 MILLIGRAM(S): 100 TABLET, FILM COATED ORAL at 06:54

## 2020-08-25 RX ADMIN — BUDESONIDE AND FORMOTEROL FUMARATE DIHYDRATE 2 PUFF(S): 160; 4.5 AEROSOL RESPIRATORY (INHALATION) at 11:25

## 2020-08-25 RX ADMIN — Medication 10 MILLIGRAM(S): at 06:54

## 2020-08-25 RX ADMIN — Medication 100 MILLIGRAM(S): at 06:54

## 2020-08-25 NOTE — PATIENT PROFILE ADULT - STATED REASON FOR ADMISSION
"Friday I went to my doctor and made blood test and yesterday he called me and told me I have to go to the hospital"

## 2020-08-25 NOTE — PROGRESS NOTE ADULT - ATTENDING COMMENTS
Patient was seen and examined personally by me. I have discussed the plan and reviewed the Student's note and agree with the above physical exam findings including assessment and plan except as indicated below. Labs and imagining reviewed.     Patient with PMH bronchiectasis, recently treated for PNA completed 7 days of abx, on steroid taper presented with Hyponatremia on outpatient labs. Labs consistent with SIADH, patient also endorsed increased water intake. on exam, bibasilar crackles, no wheezing. +LE edema. c/w treatment as above. will check pro-BNP, EKG and TTE to assess for underline CHF.

## 2020-08-25 NOTE — PROGRESS NOTE ADULT - PROBLEM SELECTOR PLAN 2
Has history of bronchiectasis.  Now with possible pneumonia or bronchiectasis exacerbation.  -Treated recently with doxycycline but will change to ctx/azithro  -Complete outpatient steroid taper as well. Treated outpt with 7d doxycycline with steroid taper.  - complete prednisone taper (day 6/9).

## 2020-08-25 NOTE — PROGRESS NOTE ADULT - SUBJECTIVE AND OBJECTIVE BOX
PROGRESS NOTE:     Patient is a 84y old female who presents with a chief complaint of hyponatremia. (24 Aug 2020 22:25)      SUBJECTIVE / OVERNIGHT EVENTS:      REVIEW OF SYSTEMS:  CONSTITUTIONAL: No weakness, fevers or chills  EYES/ENT: No visual changes;  No vertigo or throat pain   NECK: No pain or stiffness  RESPIRATORY: No cough, wheezing, hemoptysis; No shortness of breath  CARDIOVASCULAR: No chest pain or palpitations  GASTROINTESTINAL: No abdominal or epigastric pain. No nausea, vomiting, or hematemesis; No diarrhea or constipation. No melena or hematochezia.  GENITOURINARY: No dysuria, frequency or hematuria  NEUROLOGICAL: No numbness or weakness  SKIN: No itching, rashes      MEDICATIONS  (STANDING):  amLODIPine   Tablet 10 milliGRAM(s) Oral daily  aspirin enteric coated 81 milliGRAM(s) Oral daily  azithromycin   Tablet 500 milliGRAM(s) Oral daily  budesonide 160 MICROgram(s)/formoterol 4.5 MICROgram(s) Inhaler 2 Puff(s) Inhalation two times a day  cefTRIAXone   IVPB 1000 milliGRAM(s) IV Intermittent every 24 hours  heparin   Injectable 5000 Unit(s) SubCutaneous every 8 hours  losartan 100 milliGRAM(s) Oral daily  metoprolol succinate  milliGRAM(s) Oral daily  predniSONE   Tablet 10 milliGRAM(s) Oral daily  sertraline 25 milliGRAM(s) Oral at bedtime    MEDICATIONS  (PRN):  ALBUTerol    90 MICROgram(s) HFA Inhaler 2 Puff(s) Inhalation every 6 hours PRN Shortness of Breath and/or Wheezing        PHYSICAL EXAM:  Vital Signs Last 24 Hrs  T(C): 36.3 (25 Aug 2020 07:40), Max: 36.6 (24 Aug 2020 20:55)  T(F): 97.3 (25 Aug 2020 07:40), Max: 97.9 (24 Aug 2020 23:00)  HR: 82 (25 Aug 2020 07:40) (75 - 84)  BP: 151/62 (25 Aug 2020 07:40) (150/53 - 172/84)  BP(mean): --  RR: 16 (25 Aug 2020 07:40) (16 - 18)  SpO2: 100% (25 Aug 2020 07:40) (95% - 100%)    CONSTITUTIONAL: NAD, well-developed  RESPIRATORY: Normal respiratory effort; lungs are clear to auscultation bilaterally  CARDIOVASCULAR: Regular rate and rhythm, normal S1 and S2, no murmur/rub/gallop; No lower extremity edema; Peripheral pulses are 2+ bilaterally  ABDOMEN: Nontender to palpation, normoactive bowel sounds, no rebound/guarding; No hepatosplenomegaly  MUSCLOSKELETAL: no clubbing or cyanosis of digits; no joint swelling or tenderness to palpation  PSYCH: A+O to person, place, and time; affect appropriate    LABS:                        13.4   18.77 )-----------( 452      ( 25 Aug 2020 06:00 )             37.6     08-    124<L>  |  85<L>  |  12  ----------------------------<  147<H>  4.1   |  22  |  0.49<L>    Ca    9.3      25 Aug 2020 06:00  Mg     2.0         TPro  7.2  /  Alb  4.4  /  TBili  0.7  /  DBili  x   /  AST  42<H>  /  ALT  39<H>  /  AlkPhos  88  08      Urinalysis Basic - ( 24 Aug 2020 20:45 )    Color: COLORLESS / Appearance: CLEAR / S.009 / pH: 7.0  Gluc: NEGATIVE / Ketone: NEGATIVE  / Bili: NEGATIVE / Urobili: NORMAL   Blood: NEGATIVE / Protein: NEGATIVE / Nitrite: NEGATIVE   Leuk Esterase: NEGATIVE / RBC: x / WBC x   Sq Epi: x / Non Sq Epi: x / Bacteria: x        RADIOLOGY & ADDITIONAL TESTS:  < from: Xray Chest 1 View AP/PA (20 @ 22:56) >    EXAM:  XR CHEST AP OR PA 1V    PROCEDURE DATE:  Aug 24 2020       INTERPRETATION:  TIME OF EXAM: 2020 at 10:53 PM    CLINICAL INFORMATION: Abnormal chest sounds    TECHNIQUE:   Frontal chest    INTERPRETATION:    Bibasilar cystic lucencies known to represent bronchiectasis unchanged. No acute pulmonary pathology.    The heart is not enlarged and there are no effusions or congestion to indicate CHF.    Decrease in the right pleural effusion seen on the last exam.      COMPARISON:  CT chest 2019      IMPRESSION:  No acute pulmonary pathology.    < end of copied text > PROGRESS NOTE: Patient is a 84y old female who presents with a chief complaint of hyponatremia. (24 Aug 2020 22:25)      SUBJECTIVE / OVERNIGHT EVENTS: No acute events overnight. Denies headache, nausea/vomiting, chest pain, palpitations, SOB, hemoptysis, or dysuria.       REVIEW OF SYSTEMS:  CONSTITUTIONAL: +weakness, no fevers or chills  NECK: No pain or stiffness  RESPIRATORY: +cough, no wheezing or hemoptysis; No shortness of breath  CARDIOVASCULAR: No chest pain or palpitations  GASTROINTESTINAL: No abdominal or epigastric pain. No nausea, vomiting, or hematemesis; No diarrhea or constipation. No melena or hematochezia.  GENITOURINARY: No dysuria, frequency or hematuria  NEUROLOGICAL: No numbness or weakness  SKIN: No itching, rashes      MEDICATIONS  (STANDING):  amLODIPine   Tablet 10 milliGRAM(s) Oral daily  aspirin enteric coated 81 milliGRAM(s) Oral daily  azithromycin   Tablet 500 milliGRAM(s) Oral daily  budesonide 160 MICROgram(s)/formoterol 4.5 MICROgram(s) Inhaler 2 Puff(s) Inhalation two times a day  cefTRIAXone   IVPB 1000 milliGRAM(s) IV Intermittent every 24 hours  heparin   Injectable 5000 Unit(s) SubCutaneous every 8 hours  losartan 100 milliGRAM(s) Oral daily  metoprolol succinate  milliGRAM(s) Oral daily  predniSONE   Tablet 10 milliGRAM(s) Oral daily  sertraline 25 milliGRAM(s) Oral at bedtime    MEDICATIONS  (PRN):  ALBUTerol    90 MICROgram(s) HFA Inhaler 2 Puff(s) Inhalation every 6 hours PRN Shortness of Breath and/or Wheezing        PHYSICAL EXAM:  Vital Signs Last 24 Hrs  T(C): 36.3 (25 Aug 2020 07:40), Max: 36.6 (24 Aug 2020 20:55)  T(F): 97.3 (25 Aug 2020 07:40), Max: 97.9 (24 Aug 2020 23:00)  HR: 82 (25 Aug 2020 07:40) (75 - 84)  BP: 151/62 (25 Aug 2020 07:40) (150/53 - 172/84)  BP(mean): --  RR: 16 (25 Aug 2020 07:40) (16 - 18)  SpO2: 100% (25 Aug 2020 07:40) (95% - 100%)    CONSTITUTIONAL: NAD, well-developed  RESPIRATORY: Normal respiratory effort; lungs are clear to auscultation bilaterally  CARDIOVASCULAR: Regular rate and rhythm, normal S1 and S2, no murmur/rub/gallop; No lower extremity edema; Peripheral pulses are 2+ bilaterally  ABDOMEN: Nontender to palpation, normoactive bowel sounds, no rebound/guarding; No hepatosplenomegaly  MUSCLOSKELETAL: no clubbing or cyanosis of digits; no joint swelling or tenderness to palpation  PSYCH: A+O to person, place, and time; affect appropriate      LABS:                        13.4   18.77 )-----------( 452      ( 25 Aug 2020 06:00 )             37.6     08-25    124<L>  |  85<L>  |  12  ----------------------------<  147<H>  4.1   |  22  |  0.49<L>    Ca    9.3      25 Aug 2020 06:00  Mg     2.0         TPro  7.2  /  Alb  4.4  /  TBili  0.7  /  DBili  x   /  AST  42<H>  /  ALT  39<H>  /  AlkPhos  88  08-      Urinalysis Basic - ( 24 Aug 2020 20:45 )    Color: COLORLESS / Appearance: CLEAR / S.009 / pH: 7.0  Gluc: NEGATIVE / Ketone: NEGATIVE  / Bili: NEGATIVE / Urobili: NORMAL   Blood: NEGATIVE / Protein: NEGATIVE / Nitrite: NEGATIVE   Leuk Esterase: NEGATIVE / RBC: x / WBC x   Sq Epi: x / Non Sq Epi: x / Bacteria: x        RADIOLOGY & ADDITIONAL TESTS:  < from: Xray Chest 1 View AP/PA (20 @ 22:56) >    EXAM:  XR CHEST AP OR PA 1V    PROCEDURE DATE:  Aug 24 2020       INTERPRETATION:  TIME OF EXAM: 2020 at 10:53 PM    CLINICAL INFORMATION: Abnormal chest sounds    TECHNIQUE:   Frontal chest    INTERPRETATION:    Bibasilar cystic lucencies known to represent bronchiectasis unchanged. No acute pulmonary pathology.    The heart is not enlarged and there are no effusions or congestion to indicate CHF.    Decrease in the right pleural effusion seen on the last exam.      COMPARISON:  CT chest 2019      IMPRESSION:  No acute pulmonary pathology.    < end of copied text > PROGRESS NOTE: Patient is a 84y old female who presents with a chief complaint of hyponatremia. (24 Aug 2020 22:25)      SUBJECTIVE / OVERNIGHT EVENTS: No acute events overnight. Pt continuing to experience urinary frequency. Denies headache, nausea/vomiting, chest pain, palpitations, or dysuria.       REVIEW OF SYSTEMS:  CONSTITUTIONAL: +weakness, no fevers or chills  NECK: No pain or stiffness  RESPIRATORY: +cough, no wheezing or hemoptysis; No shortness of breath  CARDIOVASCULAR: No chest pain or palpitations  GASTROINTESTINAL: No abdominal or epigastric pain. No nausea, vomiting, or hematemesis; No diarrhea or constipation. No melena or hematochezia.  GENITOURINARY: No dysuria, frequency or hematuria  NEUROLOGICAL: No numbness or weakness  SKIN: No itching, rashes      MEDICATIONS  (STANDING):  amLODIPine   Tablet 10 milliGRAM(s) Oral daily  aspirin enteric coated 81 milliGRAM(s) Oral daily  azithromycin   Tablet 500 milliGRAM(s) Oral daily  budesonide 160 MICROgram(s)/formoterol 4.5 MICROgram(s) Inhaler 2 Puff(s) Inhalation two times a day  cefTRIAXone   IVPB 1000 milliGRAM(s) IV Intermittent every 24 hours  heparin   Injectable 5000 Unit(s) SubCutaneous every 8 hours  losartan 100 milliGRAM(s) Oral daily  metoprolol succinate  milliGRAM(s) Oral daily  predniSONE   Tablet 10 milliGRAM(s) Oral daily  sertraline 25 milliGRAM(s) Oral at bedtime    MEDICATIONS  (PRN):  ALBUTerol    90 MICROgram(s) HFA Inhaler 2 Puff(s) Inhalation every 6 hours PRN Shortness of Breath and/or Wheezing        PHYSICAL EXAM:  Vital Signs Last 24 Hrs  T(C): 36.3 (25 Aug 2020 07:40), Max: 36.6 (24 Aug 2020 20:55)  T(F): 97.3 (25 Aug 2020 07:40), Max: 97.9 (24 Aug 2020 23:00)  HR: 82 (25 Aug 2020 07:40) (75 - 84)  BP: 151/62 (25 Aug 2020 07:40) (150/53 - 172/84)  BP(mean): --  RR: 16 (25 Aug 2020 07:40) (16 - 18)  SpO2: 100% (25 Aug 2020 07:40) (95% - 100%)    CONSTITUTIONAL: lying in bed in NAD  RESPIRATORY: Normal respiratory effort; lungs CTAB  CARDIOVASCULAR: Regular rate and rhythm, normal S1 and S2, no murmur/rub/gallop; No lower extremity edema; Peripheral pulses are 2+ bilaterally  ABDOMEN: Nontender to palpation, normoactive bowel sounds, no rebound/guarding; No hepatosplenomegaly  MUSCLOSKELETAL: no clubbing or cyanosis of digits; no joint swelling or tenderness to palpation  PSYCH: A+O to person, place, and time; affect appropriate      LABS:                        13.4   18.77 )-----------( 452      ( 25 Aug 2020 06:00 )             37.6     08-25    124<L>  |  85<L>  |  12  ----------------------------<  147<H>  4.1   |  22  |  0.49<L>    Ca    9.3      25 Aug 2020 06:00  Mg     2.0     -24    TPro  7.2  /  Alb  4.4  /  TBili  0.7  /  DBili  x   /  AST  42<H>  /  ALT  39<H>  /  AlkPhos  88  08-24      Urinalysis Basic - ( 24 Aug 2020 20:45 )    Color: COLORLESS / Appearance: CLEAR / S.009 / pH: 7.0  Gluc: NEGATIVE / Ketone: NEGATIVE  / Bili: NEGATIVE / Urobili: NORMAL   Blood: NEGATIVE / Protein: NEGATIVE / Nitrite: NEGATIVE   Leuk Esterase: NEGATIVE / RBC: x / WBC x   Sq Epi: x / Non Sq Epi: x / Bacteria: x        RADIOLOGY & ADDITIONAL TESTS:  < from: Xray Chest 1 View AP/PA (20 @ 22:56) >    EXAM:  XR CHEST AP OR PA 1V    PROCEDURE DATE:  Aug 24 2020       INTERPRETATION:  TIME OF EXAM: 2020 at 10:53 PM    CLINICAL INFORMATION: Abnormal chest sounds    TECHNIQUE:   Frontal chest    INTERPRETATION:    Bibasilar cystic lucencies known to represent bronchiectasis unchanged. No acute pulmonary pathology.    The heart is not enlarged and there are no effusions or congestion to indicate CHF.    Decrease in the right pleural effusion seen on the last exam.      COMPARISON:  CT chest 2019      IMPRESSION:  No acute pulmonary pathology.    < end of copied text >

## 2020-08-25 NOTE — PROGRESS NOTE ADULT - PROBLEM SELECTOR PLAN 4
Continue home losartan, metoprolol, amlodipine with hold parameters - c/w home losartan, metoprolol, amlodipine with hold parameters

## 2020-08-25 NOTE — PHARMACOTHERAPY INTERVENTION NOTE - COMMENTS
Educated patient (family, caregiver) on indication, how and when to take the medication, and side effects.

## 2020-08-25 NOTE — ED ADULT NURSE REASSESSMENT NOTE - NS ED NURSE REASSESS COMMENT FT1
Break coverage RN: Pt appears comfortable, in NAD at this time, respirations even and unlabored, VS as noted. Pt awaiting bed at this time. Will continue to monitor.

## 2020-08-25 NOTE — PROGRESS NOTE ADULT - ASSESSMENT
84 year old woman with a history of bronchiectasis, anxiety, HTN who presents for hyponatremia. 84 year old woman with a history of bronchiectasis admitted for hyponatremia in the setting of bronchiectasis exacerbation. 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 Currently titrating with target <6-8 meq/24 hour correction.

## 2020-08-25 NOTE — PROGRESS NOTE ADULT - PROBLEM SELECTOR PLAN 1
-Likely SIADH given urine studies and prior history of SIADH.  -On sertraline low dose but also with pulmonary history possibly contributing to SIAHD.  Also with possible pneumonia.  -Fluid restrict, monitor I&Os  -Target <6-8 meq/24 hour correction Likely SIADH given urine studies and prior history of SIADH.  - fluid restrict 1 L  - strict I&Os  - target <6-8 meq/24 hour correction Likely SIADH given urine studies and prior history of SIADH.  - fluid restrict 1 L  - strict I&Os  - target <6-8 meq/24 hour correction  - f/u pro-BNP, EKG and TTE in case of underlying CHF

## 2020-08-26 ENCOUNTER — TRANSCRIPTION ENCOUNTER (OUTPATIENT)
Age: 85
End: 2020-08-26

## 2020-08-26 LAB
ANION GAP SERPL CALC-SCNC: 12 MMO/L — SIGNIFICANT CHANGE UP (ref 7–14)
ANION GAP SERPL CALC-SCNC: 13 MMO/L — SIGNIFICANT CHANGE UP (ref 7–14)
ANION GAP SERPL CALC-SCNC: 14 MMO/L — SIGNIFICANT CHANGE UP (ref 7–14)
BUN SERPL-MCNC: 13 MG/DL — SIGNIFICANT CHANGE UP (ref 7–23)
BUN SERPL-MCNC: 15 MG/DL — SIGNIFICANT CHANGE UP (ref 7–23)
BUN SERPL-MCNC: 15 MG/DL — SIGNIFICANT CHANGE UP (ref 7–23)
CALCIUM SERPL-MCNC: 8.6 MG/DL — SIGNIFICANT CHANGE UP (ref 8.4–10.5)
CALCIUM SERPL-MCNC: 8.8 MG/DL — SIGNIFICANT CHANGE UP (ref 8.4–10.5)
CALCIUM SERPL-MCNC: 8.9 MG/DL — SIGNIFICANT CHANGE UP (ref 8.4–10.5)
CHLORIDE SERPL-SCNC: 89 MMOL/L — LOW (ref 98–107)
CHLORIDE SERPL-SCNC: 89 MMOL/L — LOW (ref 98–107)
CHLORIDE SERPL-SCNC: 90 MMOL/L — LOW (ref 98–107)
CHLORIDE UR-SCNC: 67 MMOL/L — SIGNIFICANT CHANGE UP
CO2 SERPL-SCNC: 22 MMOL/L — SIGNIFICANT CHANGE UP (ref 22–31)
CO2 SERPL-SCNC: 23 MMOL/L — SIGNIFICANT CHANGE UP (ref 22–31)
CO2 SERPL-SCNC: 24 MMOL/L — SIGNIFICANT CHANGE UP (ref 22–31)
CREAT ?TM UR-MCNC: 60 MG/DL — SIGNIFICANT CHANGE UP
CREAT SERPL-MCNC: 0.61 MG/DL — SIGNIFICANT CHANGE UP (ref 0.5–1.3)
CREAT SERPL-MCNC: 0.63 MG/DL — SIGNIFICANT CHANGE UP (ref 0.5–1.3)
CREAT SERPL-MCNC: 0.67 MG/DL — SIGNIFICANT CHANGE UP (ref 0.5–1.3)
CULTURE RESULTS: SIGNIFICANT CHANGE UP
GLUCOSE SERPL-MCNC: 145 MG/DL — HIGH (ref 70–99)
GLUCOSE SERPL-MCNC: 169 MG/DL — HIGH (ref 70–99)
GLUCOSE SERPL-MCNC: 218 MG/DL — HIGH (ref 70–99)
HCT VFR BLD CALC: 33.8 % — LOW (ref 34.5–45)
HGB BLD-MCNC: 11.7 G/DL — SIGNIFICANT CHANGE UP (ref 11.5–15.5)
MAGNESIUM SERPL-MCNC: 1.8 MG/DL — SIGNIFICANT CHANGE UP (ref 1.6–2.6)
MAGNESIUM SERPL-MCNC: 1.8 MG/DL — SIGNIFICANT CHANGE UP (ref 1.6–2.6)
MAGNESIUM SERPL-MCNC: 1.9 MG/DL — SIGNIFICANT CHANGE UP (ref 1.6–2.6)
MCHC RBC-ENTMCNC: 29.5 PG — SIGNIFICANT CHANGE UP (ref 27–34)
MCHC RBC-ENTMCNC: 34.6 % — SIGNIFICANT CHANGE UP (ref 32–36)
MCV RBC AUTO: 85.4 FL — SIGNIFICANT CHANGE UP (ref 80–100)
NRBC # FLD: 0 K/UL — SIGNIFICANT CHANGE UP (ref 0–0)
OSMOLALITY UR: 361 MOSMO/KG — SIGNIFICANT CHANGE UP (ref 50–1200)
PHOSPHATE SERPL-MCNC: 2.6 MG/DL — SIGNIFICANT CHANGE UP (ref 2.5–4.5)
PHOSPHATE SERPL-MCNC: 2.8 MG/DL — SIGNIFICANT CHANGE UP (ref 2.5–4.5)
PHOSPHATE SERPL-MCNC: 3 MG/DL — SIGNIFICANT CHANGE UP (ref 2.5–4.5)
PLATELET # BLD AUTO: 359 K/UL — SIGNIFICANT CHANGE UP (ref 150–400)
PMV BLD: 8.2 FL — SIGNIFICANT CHANGE UP (ref 7–13)
POTASSIUM SERPL-MCNC: 3.5 MMOL/L — SIGNIFICANT CHANGE UP (ref 3.5–5.3)
POTASSIUM SERPL-MCNC: 3.7 MMOL/L — SIGNIFICANT CHANGE UP (ref 3.5–5.3)
POTASSIUM SERPL-MCNC: 3.7 MMOL/L — SIGNIFICANT CHANGE UP (ref 3.5–5.3)
POTASSIUM SERPL-SCNC: 3.5 MMOL/L — SIGNIFICANT CHANGE UP (ref 3.5–5.3)
POTASSIUM SERPL-SCNC: 3.7 MMOL/L — SIGNIFICANT CHANGE UP (ref 3.5–5.3)
POTASSIUM SERPL-SCNC: 3.7 MMOL/L — SIGNIFICANT CHANGE UP (ref 3.5–5.3)
POTASSIUM UR-SCNC: 32.8 MMOL/L — SIGNIFICANT CHANGE UP
RBC # BLD: 3.96 M/UL — SIGNIFICANT CHANGE UP (ref 3.8–5.2)
RBC # FLD: 12.2 % — SIGNIFICANT CHANGE UP (ref 10.3–14.5)
SODIUM SERPL-SCNC: 125 MMOL/L — LOW (ref 135–145)
SODIUM SERPL-SCNC: 125 MMOL/L — LOW (ref 135–145)
SODIUM SERPL-SCNC: 126 MMOL/L — LOW (ref 135–145)
SODIUM UR-SCNC: 83 MMOL/L — SIGNIFICANT CHANGE UP
SPECIMEN SOURCE: SIGNIFICANT CHANGE UP
URATE SERPL-MCNC: 2.4 MG/DL — LOW (ref 2.5–7)
WBC # BLD: 10.26 K/UL — SIGNIFICANT CHANGE UP (ref 3.8–10.5)
WBC # FLD AUTO: 10.26 K/UL — SIGNIFICANT CHANGE UP (ref 3.8–10.5)

## 2020-08-26 PROCEDURE — 99223 1ST HOSP IP/OBS HIGH 75: CPT

## 2020-08-26 PROCEDURE — 99233 SBSQ HOSP IP/OBS HIGH 50: CPT | Mod: GC

## 2020-08-26 PROCEDURE — 93306 TTE W/DOPPLER COMPLETE: CPT | Mod: 26

## 2020-08-26 RX ORDER — SIMETHICONE 80 MG/1
80 TABLET, CHEWABLE ORAL DAILY
Refills: 0 | Status: DISCONTINUED | OUTPATIENT
Start: 2020-08-26 | End: 2020-08-29

## 2020-08-26 RX ORDER — FUROSEMIDE 40 MG
20 TABLET ORAL DAILY
Refills: 0 | Status: DISCONTINUED | OUTPATIENT
Start: 2020-08-26 | End: 2020-08-28

## 2020-08-26 RX ORDER — SODIUM CHLORIDE 9 MG/ML
2 INJECTION INTRAMUSCULAR; INTRAVENOUS; SUBCUTANEOUS DAILY
Refills: 0 | Status: DISCONTINUED | OUTPATIENT
Start: 2020-08-26 | End: 2020-08-27

## 2020-08-26 RX ADMIN — HEPARIN SODIUM 5000 UNIT(S): 5000 INJECTION INTRAVENOUS; SUBCUTANEOUS at 05:47

## 2020-08-26 RX ADMIN — Medication 20 MILLIGRAM(S): at 12:10

## 2020-08-26 RX ADMIN — SERTRALINE 25 MILLIGRAM(S): 25 TABLET, FILM COATED ORAL at 21:52

## 2020-08-26 RX ADMIN — HEPARIN SODIUM 5000 UNIT(S): 5000 INJECTION INTRAVENOUS; SUBCUTANEOUS at 21:53

## 2020-08-26 RX ADMIN — BUDESONIDE AND FORMOTEROL FUMARATE DIHYDRATE 2 PUFF(S): 160; 4.5 AEROSOL RESPIRATORY (INHALATION) at 21:53

## 2020-08-26 RX ADMIN — LOSARTAN POTASSIUM 100 MILLIGRAM(S): 100 TABLET, FILM COATED ORAL at 05:48

## 2020-08-26 RX ADMIN — Medication 81 MILLIGRAM(S): at 12:10

## 2020-08-26 RX ADMIN — Medication 10 MILLIGRAM(S): at 05:48

## 2020-08-26 RX ADMIN — Medication 100 MILLIGRAM(S): at 05:47

## 2020-08-26 RX ADMIN — SIMETHICONE 80 MILLIGRAM(S): 80 TABLET, CHEWABLE ORAL at 12:10

## 2020-08-26 RX ADMIN — BUDESONIDE AND FORMOTEROL FUMARATE DIHYDRATE 2 PUFF(S): 160; 4.5 AEROSOL RESPIRATORY (INHALATION) at 10:01

## 2020-08-26 RX ADMIN — AMLODIPINE BESYLATE 10 MILLIGRAM(S): 2.5 TABLET ORAL at 12:10

## 2020-08-26 RX ADMIN — SODIUM CHLORIDE 2 GRAM(S): 9 INJECTION INTRAMUSCULAR; INTRAVENOUS; SUBCUTANEOUS at 12:10

## 2020-08-26 RX ADMIN — HEPARIN SODIUM 5000 UNIT(S): 5000 INJECTION INTRAVENOUS; SUBCUTANEOUS at 13:03

## 2020-08-26 NOTE — CONSULT NOTE ADULT - ATTENDING COMMENTS
SIADH ( SSRI likely contributing)  loop diuretics improve free water excretion and help for chronic hyponatremia

## 2020-08-26 NOTE — DISCHARGE NOTE PROVIDER - NSDCCPCAREPLAN_GEN_ALL_CORE_FT
PRINCIPAL DISCHARGE DIAGNOSIS  Diagnosis: Hyponatremia  Assessment and Plan of Treatment: You came to the hospital due to hyponatremia, which means the your blood sodium was low. This is a potentially dangerous condition that can cause confusion, fatigue, and weakness. While hospitalized, you were closely monitored and received electrolyte repletion and fluid restriction.  Be sure to monitor and try to limit your fluid intake. We recommend taking in more sodium by eating food with salt. We recommend that you follow up with your outpatient PCP and monitor whether you are taking medications that can cause hyponatremia.  SEEK IMMEDIATE MEDICAL CARE IF:  You lose consciousness (black out).  You have shaking that you cannot control (seizure).  You have ongoing diarrhea or vomiting PRINCIPAL DISCHARGE DIAGNOSIS  Diagnosis: Hyponatremia  Assessment and Plan of Treatment: You came to the hospital due to hyponatremia, which means the your blood sodium was low. This is a potentially dangerous condition that can cause confusion, fatigue, and weakness. While hospitalized, you were closely monitored and received electrolyte repletion and fluid restriction.  Be sure to monitor and try to limit your fluid intake. We recommend taking in more sodium by eating food with salt. We recommend that you follow up with your outpatient PCP and monitor whether you are taking medications that can cause hyponatremia.  Please DISCONTINUE amlodpine.  Please START furosemide (Lasix) 20 mg daily  Please START 2 g salt tabs twice a day  Please see your primary care doctor next week and repeat a basic metabolic panel and discuss with him adjusting or discontinuing your medications.  SEEK IMMEDIATE MEDICAL CARE IF:  You lose consciousness (black out).  You have shaking that you cannot control (seizure).  You have ongoing diarrhea or vomiting

## 2020-08-26 NOTE — DISCHARGE NOTE PROVIDER - NSDCMRMEDTOKEN_GEN_ALL_CORE_FT
amLODIPine 10 mg oral tablet: 1 tab(s) orally once a day  aspirin 81 mg oral tablet: 1 tab(s) orally once a day  Breo Ellipta 100 mcg-25 mcg/inh inhalation powder: 1 puff(s) inhaled once a day  doxycycline hyclate 100 mg oral capsule: 1 cap(s) orally 2 times a day  losartan 100 mg oral tablet: 1 tab(s) orally once a day  metoprolol succinate 100 mg oral tablet, extended release: 1 tab(s) orally once a day  predniSONE 10 mg oral tablet: 1 tab(s) orally once a day  sertraline 100 mg oral tablet: 1 tab(s) orally once a day (with 25 mg tablet) (total daily dose 125 mg)  sertraline 25 mg oral tablet: 1 tab(s) orally once a day (at bedtime) (with 100 mg tablet)  traZODone 50 mg oral tablet: 1 tab(s) orally once a day (at bedtime) amLODIPine 10 mg oral tablet: 1 tab(s) orally once a day  aspirin 81 mg oral tablet: 1 tab(s) orally once a day  Breo Ellipta 100 mcg-25 mcg/inh inhalation powder: 1 puff(s) inhaled once a day  losartan 100 mg oral tablet: 1 tab(s) orally once a day  metoprolol succinate 100 mg oral tablet, extended release: 1 tab(s) orally once a day  predniSONE 10 mg oral tablet: 1 tab(s) orally once a day  sertraline 100 mg oral tablet: 1 tab(s) orally once a day (with 25 mg tablet) (total daily dose 125 mg)  sertraline 25 mg oral tablet: 1 tab(s) orally once a day (at bedtime) (with 100 mg tablet)  traZODone 50 mg oral tablet: 1 tab(s) orally once a day (at bedtime) amLODIPine 10 mg oral tablet: 1 tab(s) orally once a day  aspirin 81 mg oral tablet: 1 tab(s) orally once a day  Breo Ellipta 100 mcg-25 mcg/inh inhalation powder: 1 puff(s) inhaled once a day  losartan 100 mg oral tablet: 1 tab(s) orally once a day  metoprolol succinate 100 mg oral tablet, extended release: 1 tab(s) orally once a day  sertraline 100 mg oral tablet: 1 tab(s) orally once a day (with 25 mg tablet) (total daily dose 125 mg)  sertraline 25 mg oral tablet: 1 tab(s) orally once a day (at bedtime) (with 100 mg tablet)  traZODone 50 mg oral tablet: 1 tab(s) orally once a day (at bedtime) aspirin 81 mg oral tablet: 1 tab(s) orally once a day  Breo Ellipta 100 mcg-25 mcg/inh inhalation powder: 1 puff(s) inhaled once a day  furosemide 20 mg oral tablet: 1 tab(s) orally once a day  losartan 100 mg oral tablet: 1 tab(s) orally once a day  metoprolol succinate 100 mg oral tablet, extended release: 1 tab(s) orally once a day  sertraline 100 mg oral tablet: 1 tab(s) orally once a day (with 25 mg tablet) (total daily dose 125 mg)  sertraline 25 mg oral tablet: 1 tab(s) orally once a day (at bedtime) (with 100 mg tablet)  Sodium Chloride 1 g oral tablet: 2 tab(s) orally 2 times a day  traZODone 50 mg oral tablet: 1 tab(s) orally once a day (at bedtime)

## 2020-08-26 NOTE — PROGRESS NOTE ADULT - PROBLEM SELECTOR PLAN 2
Treated outpt with 7d doxycycline with steroid taper.  - c/w prednisone taper (day 8/9). Treated outpt with 7d doxycycline with steroid taper.  - c/w prednisone taper (day 8/9)  - BCx drawn 08/25 grew anaerobic gram positive rods. Likely due to contamination, will f/u other culture drawn the same day.

## 2020-08-26 NOTE — DISCHARGE NOTE PROVIDER - NSDCFUADDAPPT_GEN_ALL_CORE_FT
Please follow up with your primary care doctor soon. Please follow up with your primary care doctor soon.    You should also see a nephrologist to follow up on your hyponatremia (low sodium). Please follow up with your primary care doctor soon.    You may also wish to see a nephrologist to follow up on your hyponatremia (low sodium). You can discuss this your primary care doctor.

## 2020-08-26 NOTE — PROGRESS NOTE ADULT - ASSESSMENT
84 year old woman with a history of bronchiectasis admitted for hyponatremia in the setting of bronchiectasis exacerbation. 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 Currently titrating with target <6-8 meq/24 hour correction.

## 2020-08-26 NOTE — DISCHARGE NOTE PROVIDER - CARE PROVIDER_API CALL
Preet Lugo  Internal Medicine  [address] [place] [id number]  Phone: [phone]  Fax: [phone]  Established Patient  Follow Up Time:

## 2020-08-26 NOTE — PROGRESS NOTE ADULT - ATTENDING COMMENTS
Patient was seen and examined personally by me. I have discussed the plan and reviewed the Student's note and agree with the above physical exam findings including assessment and plan except as indicated below. Labs and imagining reviewed.     84F with hyponatremia due to SIADH. Na not improving with fluid restriction alone. start salt tabs. nephro consult. respiratory status stable. baseline per family. echo pending.

## 2020-08-26 NOTE — CONSULT NOTE ADULT - SUBJECTIVE AND OBJECTIVE BOX
[facility] Division of Kidney Diseases & Hypertension  INITIAL CONSULT NOTE  [phone]--------------------------------------------------------------------------------  HPI: 84 year old F with PMHx of bronchiectasis, anxiety, HTN sent to [facility] on 8/24 after found to have hyponatremia on outpatient labs done by PCP. Nephrology team being consulted for hyponatremia.    Upon review of labs on [facility] HIE/[facility], pt with previous episodes of hyponatremia in the past. On admission, pt with SNa: 126 (8/24/20 at 6:22 PM). Patient was placed on fluid restriction and started on Salt tabs 2gms daily. Patient with repeat Na levels stable at 124-125. Pt with SNa: 125 at 6:14 AM today (8/26/20). Patient also on Sertraline as outpatient. Patient seen today, she reports drinking a lot of water in the past week because she has been very thirsty. On chart review, she has been having some shortness of breath and cough productive of mucous for the past week or so.  She was prescribed doxycycline and a prednisone taper for this by her outpatient physician.  Pt currently denies fevers, chills, chest pain, dysuria, SOB, abdominal pain      PAST HISTORY  --------------------------------------------------------------------------------  PAST MEDICAL & SURGICAL HISTORY:  Major depressive disorder, remission status unspecified, unspecified whether recurrent  Anxiety  Bronchiectasis  HTN (hypertension)  S/P cholecystectomy    FAMILY HISTORY:  No pertinent family history in first degree relatives    PAST SOCIAL HISTORY:    ALLERGIES & MEDICATIONS  --------------------------------------------------------------------------------  Allergies    No Known Allergies    Intolerances      Standing Inpatient Medications  amLODIPine   Tablet 10 milliGRAM(s) Oral daily  aspirin enteric coated 81 milliGRAM(s) Oral daily  budesonide 160 MICROgram(s)/formoterol 4.5 MICROgram(s) Inhaler 2 Puff(s) Inhalation two times a day  furosemide    Tablet 20 milliGRAM(s) Oral daily  heparin   Injectable 5000 Unit(s) SubCutaneous every 8 hours  losartan 100 milliGRAM(s) Oral daily  metoprolol succinate  milliGRAM(s) Oral daily  predniSONE   Tablet 10 milliGRAM(s) Oral daily  sertraline 25 milliGRAM(s) Oral at bedtime  simethicone 80 milliGRAM(s) Chew daily  sodium chloride 2 Gram(s) Oral daily    PRN Inpatient Medications  ALBUTerol    90 MICROgram(s) HFA Inhaler 2 Puff(s) Inhalation every 6 hours PRN      REVIEW OF SYSTEMS  --------------------------------------------------------------------------------  Gen: No  fevers/chills  Skin: No rashes  Head/Eyes/Ears/Mouth: No headache  Respiratory: see HPI  CV: No chest pain,   GI: No abdominal pain, diarrhea  : No increased frequency, dysuria, hematuria  MSK: No joint pain/swelling;   Neuro: No dizziness/lightheadedness, weakness, seizures    All other systems were reviewed and are negative, except as noted.    VITALS/PHYSICAL EXAM  --------------------------------------------------------------------------------  T(C): 36.6 (08-26-20 @ 12:08), Max: 36.6 (08-26-20 @ 12:08)  HR: 84 (08-26-20 @ 12:08) (77 - 85)  BP: 133/55 (08-26-20 @ 12:08) (125/66 - 154/70)  RR: 18 (08-26-20 @ 12:08) (18 - 18)  SpO2: 97% (08-26-20 @ 12:08) (95% - 98%)  Wt(kg): --  Height (cm): 154.9 (08-25-20 @ 07:40)  Weight (kg): 64.3 (08-25-20 @ 07:40)  BMI (kg/m2): 26.8 (08-25-20 @ 07:40)  BSA (m2): 1.63 (08-25-20 @ 07:40)      08-25-20 @ 07:01  -  08-26-20 @ 07:00  --------------------------------------------------------  IN: 200 mL / OUT: 700 mL / NET: -500 mL    08-26-20 @ 07:01  -  08-26-20 @ 15:22  --------------------------------------------------------  IN: 0 mL / OUT: 300 mL / NET: -300 mL      Physical Exam:  	Gen: NAD, well-appearing  	HEENT: no JVD  	Pulm: occasional dry crackles on the left lung  	CV:  S1S2  	Abd: +BS, soft   	Ext: trace B/L Lower ext edema  	Neuro: No focal deficits  	Skin: Warm, without rashes  	Vascular access: peripheral lines    LABS/STUDIES  --------------------------------------------------------------------------------              11.7   10.26 >-----------<  359      [08-26-20 @ 06:14]              33.8     126  |  90  |  15  ----------------------------<  218      [08-26-20 @ 13:54]  3.7   |  22  |  0.63        Ca     8.8     [08-26-20 @ 13:54]      iCa    1.12     [08-24 @ 18:22]      Mg     1.9     [08-26-20 @ 13:54]      Phos  3.0     [08-26-20 @ 13:54]    TPro  7.2  /  Alb  4.4  /  TBili  0.7  /  DBili  x   /  AST  42  /  ALT  39  /  AlkPhos  88  [08-24-20 @ 18:22]    Uric acid 2.4      [08-26-20 @ 06:14]  Serum Osmolality 272      [08-24-20 @ 18:22]    Creatinine Trend:  SCr 0.63 [08-26 @ 13:54]  SCr 0.61 [08-26 @ 06:14]  SCr 0.67 [08-25 @ 23:15]  SCr 0.54 [08-25 @ 14:35]  SCr 0.49 [08-25 @ 06:00]    Urinalysis - [08-24-20 @ 20:45]      Color COLORLESS / Appearance CLEAR / SG 1.009 / pH 7.0      Gluc NEGATIVE / Ketone NEGATIVE  / Bili NEGATIVE / Urobili NORMAL       Blood NEGATIVE / Protein NEGATIVE / Leuk Est NEGATIVE / Nitrite NEGATIVE      RBC  / WBC  / Hyaline  / Gran  / Sq Epi  / Non Sq Epi  / Bacteria     Urine Creatinine 60.00      [08-26-20 @ 09:20]  Urine Sodium 83      [08-26-20 @ 09:20]  Urine Urea Nitrogen 251      [08-24-20 @ 20:45]  Urine Potassium 32.8      [08-26-20 @ 09:20]  Urine Chloride 67      [08-26-20 @ 09:20]  Urine Osmolality 361      [08-26-20 @ 09:20]    TSH 1.20      [08-24-20 @ 18:22]

## 2020-08-26 NOTE — PROGRESS NOTE ADULT - SUBJECTIVE AND OBJECTIVE BOX
PROGRESS NOTE:   Patient is a 84y old female who presents with a chief complaint of hyponatremia. (25 Aug 2020 08:24)      SUBJECTIVE / OVERNIGHT EVENTS:      REVIEW OF SYSTEMS:  CONSTITUTIONAL: No weakness, fevers or chills  EYES/ENT: No visual changes;  No vertigo or throat pain   NECK: No pain or stiffness  RESPIRATORY: No cough, wheezing, hemoptysis; No shortness of breath  CARDIOVASCULAR: No chest pain or palpitations  GASTROINTESTINAL: No abdominal or epigastric pain. No nausea, vomiting, or hematemesis; No diarrhea or constipation. No melena or hematochezia.  GENITOURINARY: No dysuria, frequency or hematuria  NEUROLOGICAL: No numbness or weakness  SKIN: No itching, rashes      MEDICATIONS  (STANDING):  amLODIPine   Tablet 10 milliGRAM(s) Oral daily  aspirin enteric coated 81 milliGRAM(s) Oral daily  budesonide 160 MICROgram(s)/formoterol 4.5 MICROgram(s) Inhaler 2 Puff(s) Inhalation two times a day  heparin   Injectable 5000 Unit(s) SubCutaneous every 8 hours  losartan 100 milliGRAM(s) Oral daily  metoprolol succinate  milliGRAM(s) Oral daily  predniSONE   Tablet 10 milliGRAM(s) Oral daily  sertraline 25 milliGRAM(s) Oral at bedtime    MEDICATIONS  (PRN):  ALBUTerol    90 MICROgram(s) HFA Inhaler 2 Puff(s) Inhalation every 6 hours PRN Shortness of Breath and/or Wheezing        I&O's Summary    25 Aug 2020 07:01  -  26 Aug 2020 06:19  --------------------------------------------------------  IN: 200 mL / OUT: 700 mL / NET: -500 mL        PHYSICAL EXAM:  Vital Signs Last 24 Hrs  T(C): 36.4 (26 Aug 2020 05:41), Max: 36.7 (25 Aug 2020 13:30)  T(F): 97.6 (26 Aug 2020 05:41), Max: 98 (25 Aug 2020 13:30)  HR: 85 (26 Aug 2020 05:41) (75 - 85)  BP: 154/70 (26 Aug 2020 05:41) (125/66 - 154/70)  BP(mean): --  RR: 18 (26 Aug 2020 05:41) (16 - 18)  SpO2: 95% (26 Aug 2020 05:41) (95% - 100%)      CONSTITUTIONAL: lying in bed in NAD  RESPIRATORY: normal respiratory effort; crackles in lower lobes b/l  CARDIOVASCULAR: regular rate and rhythm, normal S1 and S2, no murmur/rub/gallop; no lower extremity edema; peripheral pulses are 2+ bilaterally  ABDOMEN: nontender to palpation, normoactive bowel sounds, no rebound/guarding; no hepatosplenomegaly  MUSCLOSKELETAL: no clubbing or cyanosis of digits; no joint swelling or tenderness to palpation  PSYCH: A+O to person, place, and time; affect appropriate      LABS:                        13.4   18.77 )-----------( 452      ( 25 Aug 2020 06:00 )             37.6     08-25    125<L>  |  89<L>  |  15  ----------------------------<  169<H>  3.7   |  24  |  0.67    Ca    8.9      25 Aug 2020 23:15  Phos  2.6     08-25  Mg     1.8     08-25    TPro  7.2  /  Alb  4.4  /  TBili  0.7  /  DBili  x   /  AST  42<H>  /  ALT  39<H>  /  AlkPhos  88  08-24          Urinalysis Basic - ( 24 Aug 2020 20:45 )    Color: COLORLESS / Appearance: CLEAR / S.009 / pH: 7.0  Gluc: NEGATIVE / Ketone: NEGATIVE  / Bili: NEGATIVE / Urobili: NORMAL   Blood: NEGATIVE / Protein: NEGATIVE / Nitrite: NEGATIVE   Leuk Esterase: NEGATIVE / RBC: x / WBC x   Sq Epi: x / Non Sq Epi: x / Bacteria: x        Culture - Blood (collected 25 Aug 2020 10:14)  Source: .Blood Blood-Venous  Gram Stain (25 Aug 2020 22:26):    Growth in anaerobic bottle: Gram Positive Rods  Preliminary Report (25 Aug 2020 22:26):    Growth in anaerobic bottle: Gram Positive Rods        RADIOLOGY & ADDITIONAL TESTS:  RADIOLOGY & ADDITIONAL TESTS:  < from: Xray Chest 1 View AP/PA (20 @ 22:56) >    EXAM:  XR CHEST AP OR PA 1V    PROCEDURE DATE:  Aug 24 2020       INTERPRETATION:  TIME OF EXAM: 2020 at 10:53 PM    CLINICAL INFORMATION: Abnormal chest sounds    TECHNIQUE:   Frontal chest    INTERPRETATION:    Bibasilar cystic lucencies known to represent bronchiectasis unchanged. No acute pulmonary pathology.    The heart is not enlarged and there are no effusions or congestion to indicate CHF.    Decrease in the right pleural effusion seen on the last exam.      COMPARISON:  CT chest 2019      IMPRESSION:  No acute pulmonary pathology.    < end of copied text > PROGRESS NOTE:   Patient is a 84y old female who presents with a chief complaint of hyponatremia. (25 Aug 2020 08:24)      SUBJECTIVE / OVERNIGHT EVENTS:        REVIEW OF SYSTEMS:  CONSTITUTIONAL: No weakness, fevers or chills  EYES/ENT: No visual changes;  No vertigo or throat pain   NECK: No pain or stiffness  RESPIRATORY: No cough, wheezing, hemoptysis; No shortness of breath  CARDIOVASCULAR: No chest pain or palpitations  GASTROINTESTINAL: No abdominal or epigastric pain. No nausea, vomiting, or hematemesis; No diarrhea or constipation. No melena or hematochezia.  GENITOURINARY: No dysuria, frequency or hematuria  NEUROLOGICAL: No numbness or weakness  SKIN: No itching, rashes      MEDICATIONS  (STANDING):  amLODIPine   Tablet 10 milliGRAM(s) Oral daily  aspirin enteric coated 81 milliGRAM(s) Oral daily  budesonide 160 MICROgram(s)/formoterol 4.5 MICROgram(s) Inhaler 2 Puff(s) Inhalation two times a day  heparin   Injectable 5000 Unit(s) SubCutaneous every 8 hours  losartan 100 milliGRAM(s) Oral daily  metoprolol succinate  milliGRAM(s) Oral daily  predniSONE   Tablet 10 milliGRAM(s) Oral daily  sertraline 25 milliGRAM(s) Oral at bedtime    MEDICATIONS  (PRN):  ALBUTerol    90 MICROgram(s) HFA Inhaler 2 Puff(s) Inhalation every 6 hours PRN Shortness of Breath and/or Wheezing        I&O's Summary    25 Aug 2020 07:01  -  26 Aug 2020 06:19  --------------------------------------------------------  IN: 200 mL / OUT: 700 mL / NET: -500 mL        PHYSICAL EXAM:  Vital Signs Last 24 Hrs  T(C): 36.4 (26 Aug 2020 05:41), Max: 36.7 (25 Aug 2020 13:30)  T(F): 97.6 (26 Aug 2020 05:41), Max: 98 (25 Aug 2020 13:30)  HR: 85 (26 Aug 2020 05:41) (75 - 85)  BP: 154/70 (26 Aug 2020 05:41) (125/66 - 154/70)  BP(mean): --  RR: 18 (26 Aug 2020 05:41) (16 - 18)  SpO2: 95% (26 Aug 2020 05:41) (95% - 100%)      CONSTITUTIONAL: lying in bed in NAD  RESPIRATORY: normal respiratory effort; crackles in lower lobes b/l  CARDIOVASCULAR: regular rate and rhythm, normal S1 and S2, no murmur/rub/gallop; no lower extremity edema; peripheral pulses are 2+ bilaterally  ABDOMEN: nontender to palpation, normoactive bowel sounds, no rebound/guarding; no hepatosplenomegaly  MUSCLOSKELETAL: no clubbing or cyanosis of digits; no joint swelling or tenderness to palpation  PSYCH: A+O to person, place, and time; affect appropriate      LABS:                        13.4   18.77 )-----------( 452      ( 25 Aug 2020 06:00 )             37.6     08-25    125<L>  |  89<L>  |  15  ----------------------------<  169<H>  3.7   |  24  |  0.67    Ca    8.9      25 Aug 2020 23:15  Phos  2.6     08-25  Mg     1.8     08-25    TPro  7.2  /  Alb  4.4  /  TBili  0.7  /  DBili  x   /  AST  42<H>  /  ALT  39<H>  /  AlkPhos  88  08-24          Urinalysis Basic - ( 24 Aug 2020 20:45 )    Color: COLORLESS / Appearance: CLEAR / S.009 / pH: 7.0  Gluc: NEGATIVE / Ketone: NEGATIVE  / Bili: NEGATIVE / Urobili: NORMAL   Blood: NEGATIVE / Protein: NEGATIVE / Nitrite: NEGATIVE   Leuk Esterase: NEGATIVE / RBC: x / WBC x   Sq Epi: x / Non Sq Epi: x / Bacteria: x        Culture - Blood (collected 25 Aug 2020 10:14)  Source: .Blood Blood-Venous  Gram Stain (25 Aug 2020 22:26):    Growth in anaerobic bottle: Gram Positive Rods  Preliminary Report (25 Aug 2020 22:26):    Growth in anaerobic bottle: Gram Positive Rods        RADIOLOGY & ADDITIONAL TESTS:  RADIOLOGY & ADDITIONAL TESTS:  < from: Xray Chest 1 View AP/PA (20 @ 22:56) >    EXAM:  XR CHEST AP OR PA 1V    PROCEDURE DATE:  Aug 24 2020       INTERPRETATION:  TIME OF EXAM: 2020 at 10:53 PM    CLINICAL INFORMATION: Abnormal chest sounds    TECHNIQUE:   Frontal chest    INTERPRETATION:    Bibasilar cystic lucencies known to represent bronchiectasis unchanged. No acute pulmonary pathology.    The heart is not enlarged and there are no effusions or congestion to indicate CHF.    Decrease in the right pleural effusion seen on the last exam.      COMPARISON:  CT chest 2019      IMPRESSION:  No acute pulmonary pathology.    < end of copied text > PROGRESS NOTE:   Patient is a 84y old female who presents with a chief complaint of hyponatremia. (25 Aug 2020 08:24)      SUBJECTIVE / OVERNIGHT EVENTS:  No acute overnight events. Pt states she was unable to eat dinner last night due to gas pain which resolved after she drank some ginger ale. Feeling well today. Weakness is improving. Denies headache, nausea/vomiting/diarrhea      REVIEW OF SYSTEMS:  CONSTITUTIONAL: No weakness, fevers or chills  EYES/ENT: No visual changes;  No vertigo or throat pain   NECK: No pain or stiffness  RESPIRATORY: No cough, wheezing, hemoptysis; No shortness of breath  CARDIOVASCULAR: No chest pain or palpitations  GASTROINTESTINAL: No abdominal or epigastric pain. No nausea, vomiting, or hematemesis; No diarrhea or constipation. No melena or hematochezia.  GENITOURINARY: No dysuria, frequency or hematuria  NEUROLOGICAL: No numbness or weakness  SKIN: No itching, rashes      MEDICATIONS  (STANDING):  amLODIPine   Tablet 10 milliGRAM(s) Oral daily  aspirin enteric coated 81 milliGRAM(s) Oral daily  budesonide 160 MICROgram(s)/formoterol 4.5 MICROgram(s) Inhaler 2 Puff(s) Inhalation two times a day  heparin   Injectable 5000 Unit(s) SubCutaneous every 8 hours  losartan 100 milliGRAM(s) Oral daily  metoprolol succinate  milliGRAM(s) Oral daily  predniSONE   Tablet 10 milliGRAM(s) Oral daily  sertraline 25 milliGRAM(s) Oral at bedtime    MEDICATIONS  (PRN):  ALBUTerol    90 MICROgram(s) HFA Inhaler 2 Puff(s) Inhalation every 6 hours PRN Shortness of Breath and/or Wheezing        I&O's Summary    25 Aug 2020 07:01  -  26 Aug 2020 06:19  --------------------------------------------------------  IN: 200 mL / OUT: 700 mL / NET: -500 mL        PHYSICAL EXAM:  Vital Signs Last 24 Hrs  T(C): 36.4 (26 Aug 2020 05:41), Max: 36.7 (25 Aug 2020 13:30)  T(F): 97.6 (26 Aug 2020 05:41), Max: 98 (25 Aug 2020 13:30)  HR: 85 (26 Aug 2020 05:41) (75 - 85)  BP: 154/70 (26 Aug 2020 05:41) (125/66 - 154/70)  BP(mean): --  RR: 18 (26 Aug 2020 05:41) (16 - 18)  SpO2: 95% (26 Aug 2020 05:41) (95% - 100%)      CONSTITUTIONAL: lying in bed in NAD  RESPIRATORY: normal respiratory effort; crackles in lower lobes b/l  CARDIOVASCULAR: regular rate and rhythm, normal S1 and S2, no murmur/rub/gallop; no lower extremity edema; peripheral pulses are 2+ bilaterally  ABDOMEN: nontender to palpation, normoactive bowel sounds, no rebound/guarding; no hepatosplenomegaly  MUSCLOSKELETAL: no clubbing or cyanosis of digits; no joint swelling or tenderness to palpation  PSYCH: A+O to person, place, and time; affect appropriate      LABS:                        13.4   18.77 )-----------( 452      ( 25 Aug 2020 06:00 )             37.6     08-25    125<L>  |  89<L>  |  15  ----------------------------<  169<H>  3.7   |  24  |  0.67    Ca    8.9      25 Aug 2020 23:15  Phos  2.6     08-25  Mg     1.8     08-25    TPro  7.2  /  Alb  4.4  /  TBili  0.7  /  DBili  x   /  AST  42<H>  /  ALT  39<H>  /  AlkPhos  88  08-24          Urinalysis Basic - ( 24 Aug 2020 20:45 )    Color: COLORLESS / Appearance: CLEAR / S.009 / pH: 7.0  Gluc: NEGATIVE / Ketone: NEGATIVE  / Bili: NEGATIVE / Urobili: NORMAL   Blood: NEGATIVE / Protein: NEGATIVE / Nitrite: NEGATIVE   Leuk Esterase: NEGATIVE / RBC: x / WBC x   Sq Epi: x / Non Sq Epi: x / Bacteria: x        Culture - Blood (collected 25 Aug 2020 10:14)  Source: .Blood Blood-Venous  Gram Stain (25 Aug 2020 22:26):    Growth in anaerobic bottle: Gram Positive Rods  Preliminary Report (25 Aug 2020 22:26):    Growth in anaerobic bottle: Gram Positive Rods        RADIOLOGY & ADDITIONAL TESTS:  RADIOLOGY & ADDITIONAL TESTS:  < from: Xray Chest 1 View AP/PA (20 @ 22:56) >    EXAM:  XR CHEST AP OR PA 1V    PROCEDURE DATE:  Aug 24 2020       INTERPRETATION:  TIME OF EXAM: 2020 at 10:53 PM    CLINICAL INFORMATION: Abnormal chest sounds    TECHNIQUE:   Frontal chest    INTERPRETATION:    Bibasilar cystic lucencies known to represent bronchiectasis unchanged. No acute pulmonary pathology.    The heart is not enlarged and there are no effusions or congestion to indicate CHF.    Decrease in the right pleural effusion seen on the last exam.      COMPARISON:  CT chest 2019      IMPRESSION:  No acute pulmonary pathology.    < end of copied text > PROGRESS NOTE:   Patient is a 84y old female who presents with a chief complaint of hyponatremia. (25 Aug 2020 08:24)      SUBJECTIVE / OVERNIGHT EVENTS:  No acute overnight events. Pt states she did not eat dinner last night due to gas pain which resolved after she drank some ginger ale. Feeling well today. Weakness improving. Denies headache, sob, abdominal pain, n/v/d, dysuria.       REVIEW OF SYSTEMS:  CONSTITUTIONAL: No weakness, fevers or chills  EYES/ENT: No visual changes;  No vertigo or throat pain   NECK: No pain or stiffness  RESPIRATORY: No cough, wheezing, hemoptysis; No shortness of breath  CARDIOVASCULAR: No chest pain or palpitations  GASTROINTESTINAL: No abdominal or epigastric pain. No nausea, vomiting, or hematemesis; No diarrhea or constipation. No melena or hematochezia.  GENITOURINARY: No dysuria, frequency or hematuria  NEUROLOGICAL: No numbness or weakness  SKIN: No itching, rashes      MEDICATIONS  (STANDING):  amLODIPine   Tablet 10 milliGRAM(s) Oral daily  aspirin enteric coated 81 milliGRAM(s) Oral daily  budesonide 160 MICROgram(s)/formoterol 4.5 MICROgram(s) Inhaler 2 Puff(s) Inhalation two times a day  heparin   Injectable 5000 Unit(s) SubCutaneous every 8 hours  losartan 100 milliGRAM(s) Oral daily  metoprolol succinate  milliGRAM(s) Oral daily  predniSONE   Tablet 10 milliGRAM(s) Oral daily  sertraline 25 milliGRAM(s) Oral at bedtime    MEDICATIONS  (PRN):  ALBUTerol    90 MICROgram(s) HFA Inhaler 2 Puff(s) Inhalation every 6 hours PRN Shortness of Breath and/or Wheezing        I&O's Summary    25 Aug 2020 07:01  -  26 Aug 2020 06:19  --------------------------------------------------------  IN: 200 mL / OUT: 700 mL / NET: -500 mL        PHYSICAL EXAM:  Vital Signs Last 24 Hrs  T(C): 36.4 (26 Aug 2020 05:41), Max: 36.7 (25 Aug 2020 13:30)  T(F): 97.6 (26 Aug 2020 05:41), Max: 98 (25 Aug 2020 13:30)  HR: 85 (26 Aug 2020 05:41) (75 - 85)  BP: 154/70 (26 Aug 2020 05:41) (125/66 - 154/70)  BP(mean): --  RR: 18 (26 Aug 2020 05:41) (16 - 18)  SpO2: 95% (26 Aug 2020 05:41) (95% - 100%)      CONSTITUTIONAL: lying in bed in NAD  RESPIRATORY: normal respiratory effort; crackles in lower lobes b/l  CARDIOVASCULAR: regular rate and rhythm, normal S1 and S2, no murmur/rub/gallop; no lower extremity edema; peripheral pulses are 2+ bilaterally  ABDOMEN: nontender to palpation, normoactive bowel sounds, no rebound/guarding; no hepatosplenomegaly  MUSCLOSKELETAL: no clubbing or cyanosis of digits; no joint swelling or tenderness to palpation  PSYCH: A+O to person, place, and time; affect appropriate      LABS:                        13.4   18.77 )-----------( 452      ( 25 Aug 2020 06:00 )             37.6     08-25    125<L>  |  89<L>  |  15  ----------------------------<  169<H>  3.7   |  24  |  0.67    Ca    8.9      25 Aug 2020 23:15  Phos  2.6     08-25  Mg     1.8     08-25    TPro  7.2  /  Alb  4.4  /  TBili  0.7  /  DBili  x   /  AST  42<H>  /  ALT  39<H>  /  AlkPhos  88  08-24          Urinalysis Basic - ( 24 Aug 2020 20:45 )    Color: COLORLESS / Appearance: CLEAR / S.009 / pH: 7.0  Gluc: NEGATIVE / Ketone: NEGATIVE  / Bili: NEGATIVE / Urobili: NORMAL   Blood: NEGATIVE / Protein: NEGATIVE / Nitrite: NEGATIVE   Leuk Esterase: NEGATIVE / RBC: x / WBC x   Sq Epi: x / Non Sq Epi: x / Bacteria: x        Culture - Blood (collected 25 Aug 2020 10:14)  Source: .Blood Blood-Venous  Gram Stain (25 Aug 2020 22:26):    Growth in anaerobic bottle: Gram Positive Rods  Preliminary Report (25 Aug 2020 22:26):    Growth in anaerobic bottle: Gram Positive Rods        RADIOLOGY & ADDITIONAL TESTS:  RADIOLOGY & ADDITIONAL TESTS:  < from: Xray Chest 1 View AP/PA (20 @ 22:56) >    EXAM:  XR CHEST AP OR PA 1V    PROCEDURE DATE:  Aug 24 2020       INTERPRETATION:  TIME OF EXAM: 2020 at 10:53 PM    CLINICAL INFORMATION: Abnormal chest sounds    TECHNIQUE:   Frontal chest    INTERPRETATION:    Bibasilar cystic lucencies known to represent bronchiectasis unchanged. No acute pulmonary pathology.    The heart is not enlarged and there are no effusions or congestion to indicate CHF.    Decrease in the right pleural effusion seen on the last exam.      COMPARISON:  CT chest 2019      IMPRESSION:  No acute pulmonary pathology.    < end of copied text >

## 2020-08-26 NOTE — CONSULT NOTE ADULT - PROBLEM SELECTOR RECOMMENDATION 9
Pt with hypo-osmolar hyponatremia in the setting of increased free water intake, (SSRI) and possible SIADH?. Pt with Serum osm: 271, UOsm: 361 with Junito: 83. Patient currently euvolemic. Pt on salt tabs 2gm daily. Recommend start oral Lasix 20mg daily.  Fluid restrict 1L/day. Monitor serum Na every 6-8 hours. Avoid overcorrection to no more that 6-8 meq/day.     If you have any questions, please feel free to contact me  Adonay Mortensen  Nephrology Fellow  Pager: [phone] / [phone]  (After 5pm or on weekends please page the on-call fellow)

## 2020-08-26 NOTE — DISCHARGE NOTE PROVIDER - HOSPITAL COURSE
85 y/o F with a history of bronchiectasis and history of SIADH who presents for hyponatremia in the context of recent bronchiectasis exacerbation. Pt had been experiencing SOB and cough productive of white mucous over the past week for which she was prescribed doxycycline and a prednisone taper by her outpatient physician. As per her son, she became very weak three days before admission and seemed "less energetic than usual." On labs drawn on Friday 08/21, she was found to be hyponatremic and was advised to go the ED.  She denies fevers, chills, chest pain, dysuria, diaphoresis, n/v/d/constipation.          In the ED, pt was afebrile with unremarkable vital signs.  Diagnostics revealed Na of 126, normal TSH, UOsm>SOsm, and urine sodium of 92, consistent with SIADH. She was started on strict I&Os and fluid restriction with target correction <6-8 meq/24 hr. She was seen by nephrology and started on lasix and salt tabs. Her symptoms of bronchiectasis were at baseline per her son and she was continued on her prescribed prednisone taper. Continued on home medications for her anxiety and hypertension. 85 y/o F with a history of bronchiectasis and history of SIADH who presents for hyponatremia in the context of recent bronchiectasis exacerbation. Pt had been experiencing SOB and cough productive of white mucous over the past week for which she was prescribed doxycycline and a prednisone taper by her outpatient physician. As per her son, she became very weak three days before admission and seemed "less energetic than usual." On labs drawn on Friday 08/21, she was found to be hyponatremic and was advised to go the ED.  She denies fevers, chills, chest pain, dysuria, diaphoresis, n/v/d/constipation.          In the ED, pt was afebrile with unremarkable vital signs.  Diagnostics revealed Na of 126, normal TSH, UOsm>SOsm, and urine sodium of 92, consistent with SIADH. She was started on strict I&Os and fluid restriction with target correction <6-8 meq/24 hr. She was seen by nephrology and started on lasix and salt tabs. Her symptoms of bronchiectasis were at baseline per her son and she was continued on her prescribed prednisone taper. Continued on home medications for her anxiety and hypertension.        Her hyponatremia improved to a Na of 133 and she was medically optimized for discharge with follow up with her primary care doctor and nephrology.     \ 85 y/o F with a history of bronchiectasis and history of SIADH who presents for hyponatremia in the context of recent bronchiectasis exacerbation. Pt had been experiencing SOB and cough productive of white mucous over the past week for which she was prescribed doxycycline and a prednisone taper by her outpatient physician. As per her son, she became very weak three days before admission and seemed "less energetic than usual." On labs drawn on Friday 08/21, she was found to be hyponatremic and was advised to go the ED.  She denies fevers, chills, chest pain, dysuria, diaphoresis, n/v/d/constipation.          In the ED, pt was afebrile with unremarkable vital signs.  Diagnostics revealed Na of 126, normal TSH, UOsm>SOsm, and urine sodium of 92, consistent with SIADH. She was started on strict I&Os and fluid restriction with target correction <6-8 meq/24 hr. She was seen by nephrology and started on lasix and salt tabs. Her symptoms of bronchiectasis were at baseline per her son and she was continued on her prescribed prednisone taper. Continued on home medications for her anxiety and hypertension.        Her hyponatremia improved to a Na of 133. On 8/28 she felt lightheaded and nauseated after standing up to use the bathroom. She was given a bolus of 500 cc NS, Zofran, and meclizine which improved her symptoms.         She was medically optimized for discharge with follow up with her primary care doctor and nephrology.

## 2020-08-26 NOTE — PROGRESS NOTE ADULT - PROBLEM SELECTOR PLAN 1
Hypo-osmolar hyponatremia. Serum osm 272, urine osm 298, Junito 92. 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  - strict I&Os  - fluid restrict 1 L  - target <6-8 meq/24 hour correction  - f/u pro-BNP, EKG and TTE in case of underlying CHF Hypo-osmolar hyponatremia. Serum osm 272, urine osm 298, Junito 92. 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  - target <6-8 meq/24 hour correction  - strict I&Os  - fluid restrict 1 L  - encouraged increased PO intake; start simethicone for gas pain  - neuro consulted due to slow correction; will f/u recs  - pro-.1, f/u TTE in case of underlying CHF Hypo-osmolar hyponatremia. Serum osm 272, urine osm 298, Junito 92. 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  - target <6-8 meq/24 hour correction  - strict I&Os  - fluid restrict 1 L  - encouraged increased PO intake; start simethicone for gas pain  - 2g salt tablets daily  - neuro consulted; will f/u recs  - pro-.1, f/u TTE in case of underlying CHF Hypo-osmolar hyponatremia. Serum osm 272, urine osm 298, Junito 92. 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  - target <6-8 meq/24 hour correction  - strict I&Os  - fluid restrict 1 L  - encouraged increased PO intake; start simethicone for gas pain  - 2g salt tablets daily  - as per nephro, 20mg lasix PO  - pro-.1, f/u TTE in case of underlying CHF  - neuro consulted; appreciate recs Hypo-osmolar hyponatremia. Serum osm 272, urine osm 298, Junito 92. 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  - target <6-8 meq/24 hour correction  - strict I&Os  - fluid restrict 1 L  - encouraged increased PO intake; start simethicone for gas pain  - 2g salt tablets daily  - as per nephro, 20mg lasix PO  - pro-.1, f/u TTE in case of underlying CHF  - nephro consulted; appreciate recs

## 2020-08-27 DIAGNOSIS — Z02.9 ENCOUNTER FOR ADMINISTRATIVE EXAMINATIONS, UNSPECIFIED: ICD-10-CM

## 2020-08-27 LAB
ANION GAP SERPL CALC-SCNC: 10 MMO/L — SIGNIFICANT CHANGE UP (ref 7–14)
ANION GAP SERPL CALC-SCNC: 14 MMO/L — SIGNIFICANT CHANGE UP (ref 7–14)
BUN SERPL-MCNC: 15 MG/DL — SIGNIFICANT CHANGE UP (ref 7–23)
BUN SERPL-MCNC: 19 MG/DL — SIGNIFICANT CHANGE UP (ref 7–23)
CALCIUM SERPL-MCNC: 8.5 MG/DL — SIGNIFICANT CHANGE UP (ref 8.4–10.5)
CALCIUM SERPL-MCNC: 8.6 MG/DL — SIGNIFICANT CHANGE UP (ref 8.4–10.5)
CHLORIDE SERPL-SCNC: 90 MMOL/L — LOW (ref 98–107)
CHLORIDE SERPL-SCNC: 92 MMOL/L — LOW (ref 98–107)
CO2 SERPL-SCNC: 23 MMOL/L — SIGNIFICANT CHANGE UP (ref 22–31)
CO2 SERPL-SCNC: 25 MMOL/L — SIGNIFICANT CHANGE UP (ref 22–31)
CREAT SERPL-MCNC: 0.59 MG/DL — SIGNIFICANT CHANGE UP (ref 0.5–1.3)
CREAT SERPL-MCNC: 0.67 MG/DL — SIGNIFICANT CHANGE UP (ref 0.5–1.3)
GLUCOSE SERPL-MCNC: 150 MG/DL — HIGH (ref 70–99)
GLUCOSE SERPL-MCNC: 226 MG/DL — HIGH (ref 70–99)
HCT VFR BLD CALC: 30.8 % — LOW (ref 34.5–45)
HGB BLD-MCNC: 11.3 G/DL — LOW (ref 11.5–15.5)
MAGNESIUM SERPL-MCNC: 1.7 MG/DL — SIGNIFICANT CHANGE UP (ref 1.6–2.6)
MAGNESIUM SERPL-MCNC: 1.9 MG/DL — SIGNIFICANT CHANGE UP (ref 1.6–2.6)
MCHC RBC-ENTMCNC: 30.9 PG — SIGNIFICANT CHANGE UP (ref 27–34)
MCHC RBC-ENTMCNC: 36.7 % — HIGH (ref 32–36)
MCV RBC AUTO: 84.2 FL — SIGNIFICANT CHANGE UP (ref 80–100)
NRBC # FLD: 0 K/UL — SIGNIFICANT CHANGE UP (ref 0–0)
PHOSPHATE SERPL-MCNC: 2.4 MG/DL — LOW (ref 2.5–4.5)
PHOSPHATE SERPL-MCNC: 3 MG/DL — SIGNIFICANT CHANGE UP (ref 2.5–4.5)
PLATELET # BLD AUTO: 335 K/UL — SIGNIFICANT CHANGE UP (ref 150–400)
PMV BLD: 8.5 FL — SIGNIFICANT CHANGE UP (ref 7–13)
POTASSIUM SERPL-MCNC: 3.4 MMOL/L — LOW (ref 3.5–5.3)
POTASSIUM SERPL-MCNC: 4 MMOL/L — SIGNIFICANT CHANGE UP (ref 3.5–5.3)
POTASSIUM SERPL-SCNC: 3.4 MMOL/L — LOW (ref 3.5–5.3)
POTASSIUM SERPL-SCNC: 4 MMOL/L — SIGNIFICANT CHANGE UP (ref 3.5–5.3)
RBC # BLD: 3.66 M/UL — LOW (ref 3.8–5.2)
RBC # FLD: 12.4 % — SIGNIFICANT CHANGE UP (ref 10.3–14.5)
SODIUM SERPL-SCNC: 127 MMOL/L — LOW (ref 135–145)
SODIUM SERPL-SCNC: 127 MMOL/L — LOW (ref 135–145)
WBC # BLD: 9.72 K/UL — SIGNIFICANT CHANGE UP (ref 3.8–10.5)
WBC # FLD AUTO: 9.72 K/UL — SIGNIFICANT CHANGE UP (ref 3.8–10.5)

## 2020-08-27 PROCEDURE — 99232 SBSQ HOSP IP/OBS MODERATE 35: CPT | Mod: GC

## 2020-08-27 PROCEDURE — 99233 SBSQ HOSP IP/OBS HIGH 50: CPT | Mod: GC

## 2020-08-27 RX ORDER — SODIUM CHLORIDE 9 MG/ML
2 INJECTION INTRAMUSCULAR; INTRAVENOUS; SUBCUTANEOUS
Refills: 0 | Status: DISCONTINUED | OUTPATIENT
Start: 2020-08-27 | End: 2020-08-29

## 2020-08-27 RX ORDER — SODIUM,POTASSIUM PHOSPHATES 278-250MG
1 POWDER IN PACKET (EA) ORAL ONCE
Refills: 0 | Status: COMPLETED | OUTPATIENT
Start: 2020-08-27 | End: 2020-08-27

## 2020-08-27 RX ORDER — POTASSIUM CHLORIDE 20 MEQ
10 PACKET (EA) ORAL
Refills: 0 | Status: COMPLETED | OUTPATIENT
Start: 2020-08-27 | End: 2020-08-27

## 2020-08-27 RX ADMIN — Medication 100 MILLIEQUIVALENT(S): at 06:39

## 2020-08-27 RX ADMIN — ALBUTEROL 2 PUFF(S): 90 AEROSOL, METERED ORAL at 06:11

## 2020-08-27 RX ADMIN — HEPARIN SODIUM 5000 UNIT(S): 5000 INJECTION INTRAVENOUS; SUBCUTANEOUS at 13:02

## 2020-08-27 RX ADMIN — Medication 100 MILLIEQUIVALENT(S): at 05:34

## 2020-08-27 RX ADMIN — HEPARIN SODIUM 5000 UNIT(S): 5000 INJECTION INTRAVENOUS; SUBCUTANEOUS at 22:02

## 2020-08-27 RX ADMIN — AMLODIPINE BESYLATE 10 MILLIGRAM(S): 2.5 TABLET ORAL at 13:01

## 2020-08-27 RX ADMIN — Medication 1 PACKET(S): at 04:34

## 2020-08-27 RX ADMIN — HEPARIN SODIUM 5000 UNIT(S): 5000 INJECTION INTRAVENOUS; SUBCUTANEOUS at 05:55

## 2020-08-27 RX ADMIN — BUDESONIDE AND FORMOTEROL FUMARATE DIHYDRATE 2 PUFF(S): 160; 4.5 AEROSOL RESPIRATORY (INHALATION) at 22:02

## 2020-08-27 RX ADMIN — Medication 100 MILLIGRAM(S): at 05:56

## 2020-08-27 RX ADMIN — Medication 81 MILLIGRAM(S): at 13:01

## 2020-08-27 RX ADMIN — SIMETHICONE 80 MILLIGRAM(S): 80 TABLET, CHEWABLE ORAL at 13:01

## 2020-08-27 RX ADMIN — SODIUM CHLORIDE 2 GRAM(S): 9 INJECTION INTRAMUSCULAR; INTRAVENOUS; SUBCUTANEOUS at 17:51

## 2020-08-27 RX ADMIN — Medication 20 MILLIGRAM(S): at 05:56

## 2020-08-27 RX ADMIN — Medication 10 MILLIGRAM(S): at 05:56

## 2020-08-27 RX ADMIN — BUDESONIDE AND FORMOTEROL FUMARATE DIHYDRATE 2 PUFF(S): 160; 4.5 AEROSOL RESPIRATORY (INHALATION) at 10:02

## 2020-08-27 RX ADMIN — LOSARTAN POTASSIUM 100 MILLIGRAM(S): 100 TABLET, FILM COATED ORAL at 05:57

## 2020-08-27 RX ADMIN — Medication 100 MILLIEQUIVALENT(S): at 04:34

## 2020-08-27 RX ADMIN — SERTRALINE 25 MILLIGRAM(S): 25 TABLET, FILM COATED ORAL at 22:03

## 2020-08-27 NOTE — PROGRESS NOTE ADULT - PROBLEM SELECTOR PLAN 5
Improve score 1 for age.  - SQH for VTE ppx. DVT PPE: Improve score 1 for age. SQH for VTE ppx  Diet: Regular diet wit 1L fluid restriction  Disposition: Pending clinical course DVT PPX: Improve score 1 for age - SQH  Diet: Regular diet wit 1L fluid restriction  Disposition: Pending clinical course

## 2020-08-27 NOTE — PROGRESS NOTE ADULT - SUBJECTIVE AND OBJECTIVE BOX
Contact Information:  Silvana Post II, MD, MPH  PGY-2, Internal Medicine  Pager: [phone] ([facility]) /// [phone] ([facility])    AMANDA UMANZOR, [id number]    Patient is a 84y old  Female who presents with a chief complaint of CC: hyponatremia (26 Aug 2020 15:18)      OVERNIGHT EVENTS:    SUBJECTIVE:    CONSTITUTIONAL: No weakness. No fatigue. No fever.  HEAD: No head trauma.   EYES: No vision changes.  ENT: No hearing changes or tinnitus. No ear pain. No changes in smell. No nasal congestion or discharge. No sore throat. No voice hoarseness.   NECK: No neck pain or stiffness. No lumps.  RESPIRATORY: No cough. No SOB. No wheezing. No hemoptysis.   CARDIOVASCULAR: No chest pain. No palpitations.   GASTROINTESTINAL: No dysphagia. No ABD pain. No distension. No constipation. No diarrhea. No pain with defecation. No hematemesis. No hematochezia or melena.  BACK: No back pain.  GENITOURINARY: No dysuria. No frequency or urgency. No hesitancy. No incontinence. No urinary retention. No suprapubic pain. No hematuria.  EXTREMITY: No swelling.  MUSCULOSKELETAL: No joint pain or swelling. No fractures. No stiffness.    SKIN: No rashes. No itching. No skin, hair, or nail changes.  NEUROLOGICAL: No weakness or paralysis. No lightheadedness or dizziness. No HA. No numbness or tingling.   PSYCHIATRIC: No depression.       OBJECTIVE:  Vital Signs Last 24 Hrs  T(C): 36.7 (26 Aug 2020 21:20), Max: 36.7 (26 Aug 2020 21:20)  T(F): 98 (26 Aug 2020 21:20), Max: 98 (26 Aug 2020 21:20)  HR: 78 (26 Aug 2020 21:20) (78 - 84)  BP: 142/49 (26 Aug 2020 21:20) (133/55 - 142/49)  BP(mean): --  RR: 18 (26 Aug 2020 21:20) (18 - 18)  SpO2: 95% (26 Aug 2020 21:20) (95% - 97%)  I&O's Summary    26 Aug 2020 07:01  -  27 Aug 2020 07:00  --------------------------------------------------------  IN: 0 mL / OUT: 300 mL / NET: -300 mL        MEDICATIONS  (STANDING):  amLODIPine   Tablet 10 milliGRAM(s) Oral daily  aspirin enteric coated 81 milliGRAM(s) Oral daily  budesonide 160 MICROgram(s)/formoterol 4.5 MICROgram(s) Inhaler 2 Puff(s) Inhalation two times a day  furosemide    Tablet 20 milliGRAM(s) Oral daily  heparin   Injectable 5000 Unit(s) SubCutaneous every 8 hours  losartan 100 milliGRAM(s) Oral daily  metoprolol succinate  milliGRAM(s) Oral daily  sertraline 25 milliGRAM(s) Oral at bedtime  simethicone 80 milliGRAM(s) Chew daily  sodium chloride 2 Gram(s) Oral daily    MEDICATIONS  (PRN):  ALBUTerol    90 MICROgram(s) HFA Inhaler 2 Puff(s) Inhalation every 6 hours PRN Shortness of Breath and/or Wheezing    Allergies    No Known Allergies    Intolerances        CONSTITUTIONAL: No acute distress. Awake and alert.  HEAD: No evidence of trauma. Structures WNL.  EYES: +PERRL. +EOMI. No scleral icterus. No conjunctival injection.  ENT: Moist oral mucosa. No erythema. No pharyngeal exudates.   NECK: Supple. Appropriate ROM. No stiffness. No masses or lymphadenopathy.  RESPIRATORY: CTAB. No wheezes, rales, or rhonchi. No accessory muscle use. No apparent respiratory distress.  CARDIOVASCULAR: +S1/S2. No audible S3/S4. Regular rate and rhythm. No murmurs, rubs, or gallops. 2+ radial pulses x b/l UE; 2+ DP pulses x b/l LE.   GASTROINTESTINAL: Soft, nontender, nondistended. +BS. No rebound or guarding.   BACK: No spinal or paraspinal tenderness. No CVA tenderness.  EXTREMITY: No LE swelling or edema. EXTs warm to touch.  MUSCULOSKELETAL: Spontaneous movement in all extremities.  DERMATOLOGICAL: No abnormal rashes or lesions.  NEUROLOGICAL: CN 2-12 grossly intact. No focal deficits. Sensation intact x 4EXT. A&Ox3 (oriented to person, place, and time).  PSYCHIATRIC: Appropriate affect.                            11.7   10.26 )-----------( 359      ( 26 Aug 2020 06:14 )             33.8       08-26    127<L>  |  90<L>  |  19  ----------------------------<  226<H>  3.4<L>   |  23  |  0.67    Ca    8.6      26 Aug 2020 22:10  Phos  2.4     08-26  Mg     1.7     08-26      CAPILLARY BLOOD GLUCOSE                  Culture - Blood (collected 25 Aug 2020 10:14)  Source: .Blood Blood-Peripheral  Preliminary Report (26 Aug 2020 11:01):    No growth to date.    Culture - Blood (collected 25 Aug 2020 06:00)  Source: .Blood Blood-Venous  Gram Stain (25 Aug 2020 22:26):    Growth in anaerobic bottle: Gram Positive Rods  Final Report (26 Aug 2020 18:30):    Growth in anaerobic bottle: Bacillus species not anthracis    "Susceptibilities not performed"          RADIOLOGY AND ADDITIONAL TESTS:    CONSULTANT NOTES REVIEWED:    CARE DISCUSSED WITH THE FOLLOWING CONSULTANTS/PROVIDERS: Contact Information:  Silvana Post II, MD, MPH  PGY-2, Internal Medicine  Pager: [phone] ([facility]) /// [phone] ([facility])    AMANDA UMANZOR, [id number]    Patient is a 84y old  Female who presents with a chief complaint of CC: hyponatremia (26 Aug 2020 15:18)      OVERNIGHT EVENTS: Overnight, patient's sodium 127. Electrolytes repleted.    SUBJECTIVE: Patient evaluated at bedside, complaining of residual mild weakness and slight SOB. Otherwise, tolerating PO intake and urinating/passing bowel movements. No other complaints. Denies fever, lightheadedness, dizziness, ABD/CP, diarrhea.    CONSTITUTIONAL: +Mild weakness. No fatigue. No fever.  HEAD: No head trauma.   EYES: No vision changes.  ENT: No hearing changes or tinnitus. No ear pain. No changes in smell. No nasal congestion or discharge. No sore throat. No voice hoarseness.   NECK: No neck pain or stiffness. No lumps.  RESPIRATORY: No cough. +Mild SOB. No wheezing. No hemoptysis.   CARDIOVASCULAR: No chest pain. No palpitations.   GASTROINTESTINAL: No dysphagia. No ABD pain. No distension. No constipation. No diarrhea. No pain with defecation. No hematemesis. No hematochezia or melena.  BACK: No back pain.  GENITOURINARY: No dysuria. No frequency or urgency. No hesitancy. No incontinence. No urinary retention. No suprapubic pain. No hematuria.  EXTREMITY: No swelling.  MUSCULOSKELETAL: No joint pain or swelling. No fractures. No stiffness.    SKIN: No rashes. No itching. No skin, hair, or nail changes.  NEUROLOGICAL: No weakness or paralysis. No lightheadedness or dizziness. No HA. No numbness or tingling.   PSYCHIATRIC: No depression.       OBJECTIVE:  Vital Signs Last 24 Hrs  T(C): 36.7 (26 Aug 2020 21:20), Max: 36.7 (26 Aug 2020 21:20)  T(F): 98 (26 Aug 2020 21:20), Max: 98 (26 Aug 2020 21:20)  HR: 78 (26 Aug 2020 21:20) (78 - 84)  BP: 142/49 (26 Aug 2020 21:20) (133/55 - 142/49)  BP(mean): --  RR: 18 (26 Aug 2020 21:20) (18 - 18)  SpO2: 95% (26 Aug 2020 21:20) (95% - 97%)  I&O's Summary    26 Aug 2020 07:01  -  27 Aug 2020 07:00  --------------------------------------------------------  IN: 0 mL / OUT: 300 mL / NET: -300 mL        MEDICATIONS  (STANDING):  amLODIPine   Tablet 10 milliGRAM(s) Oral daily  aspirin enteric coated 81 milliGRAM(s) Oral daily  budesonide 160 MICROgram(s)/formoterol 4.5 MICROgram(s) Inhaler 2 Puff(s) Inhalation two times a day  furosemide    Tablet 20 milliGRAM(s) Oral daily  heparin   Injectable 5000 Unit(s) SubCutaneous every 8 hours  losartan 100 milliGRAM(s) Oral daily  metoprolol succinate  milliGRAM(s) Oral daily  sertraline 25 milliGRAM(s) Oral at bedtime  simethicone 80 milliGRAM(s) Chew daily  sodium chloride 2 Gram(s) Oral daily    MEDICATIONS  (PRN):  ALBUTerol    90 MICROgram(s) HFA Inhaler 2 Puff(s) Inhalation every 6 hours PRN Shortness of Breath and/or Wheezing    Allergies    No Known Allergies    Intolerances        CONSTITUTIONAL: No acute distress. Awake and alert.  EYES: No scleral icterus. No conjunctival injection.  ENT: Moist oral mucosa. No erythema. No pharyngeal exudates.   RESPIRATORY: CTAB. No wheezes, rales, or rhonchi. No accessory muscle use. No apparent respiratory distress.  CARDIOVASCULAR: +S1/S2. No audible S3/S4. Regular rate and rhythm. No murmurs, rubs, or gallops.    GASTROINTESTINAL: Soft, nontender, nondistended. +BS. No rebound or guarding.   BACK: No spinal or paraspinal tenderness. No CVA tenderness.  EXTREMITY: No LE swelling or edema. EXTs warm to touch.  MUSCULOSKELETAL: Spontaneous movement in all extremities.  DERMATOLOGICAL: No abnormal rashes or lesions.  NEUROLOGICAL: Nonfocal.  PSYCHIATRIC: Appropriate affect.                            11.7   10.26 )-----------( 359      ( 26 Aug 2020 06:14 )             33.8       08-26    127<L>  |  90<L>  |  19  ----------------------------<  226<H>  3.4<L>   |  23  |  0.67    Ca    8.6      26 Aug 2020 22:10  Phos  2.4     08-26  Mg     1.7     08-26      CAPILLARY BLOOD GLUCOSE                  Culture - Blood (collected 25 Aug 2020 10:14)  Source: .Blood Blood-Peripheral  Preliminary Report (26 Aug 2020 11:01):    No growth to date.    Culture - Blood (collected 25 Aug 2020 06:00)  Source: .Blood Blood-Venous  Gram Stain (25 Aug 2020 22:26):    Growth in anaerobic bottle: Gram Positive Rods  Final Report (26 Aug 2020 18:30):    Growth in anaerobic bottle: Bacillus species not anthracis    "Susceptibilities not performed"          RADIOLOGY AND ADDITIONAL TESTS:    CONSULTANT NOTES REVIEWED:    CARE DISCUSSED WITH THE FOLLOWING CONSULTANTS/PROVIDERS:

## 2020-08-27 NOTE — PROGRESS NOTE ADULT - PROBLEM SELECTOR PLAN 1
Hypo-osmolar hyponatremia. Serum osm 272, urine osm 298, Junito 92. 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  - target <6-8 meq/24 hour correction  - strict I&Os  - fluid restrict 1 L  - encouraged increased PO intake; start simethicone for gas pain  - 2g salt tablets daily  - as per nephro, 20mg lasix PO  - pro-.1, f/u TTE in case of underlying CHF  - nephro consulted; appreciate recs Hypo-osmolar hyponatremia. Serum osm 272, urine osm 298, Junito 92. 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  - Target <6-8 meq/24 hour correction; Na 127  - Strict I&Os  - Fluid restriction - 1L daily  - Continue to encourage  - Simethicone for gas pain  - Increase salt tabs to 2g BID  - C/w 20mg lasix PO  - Nephro consulted; appreciate recs  - TTE - EF 61%; NML LV systolic function, NML RV systolic function

## 2020-08-27 NOTE — PROGRESS NOTE ADULT - PROBLEM SELECTOR PLAN 4
- c/w home losartan, metoprolol, amlodipine with hold parameters - c/w home losartan, metoprolol, amlodipine with hold parameters  - -142

## 2020-08-27 NOTE — PROGRESS NOTE ADULT - SUBJECTIVE AND OBJECTIVE BOX
[facility] DIVISION OF KIDNEY DISEASES AND HYPERTENSION -- FOLLOW UP NOTE  MAURICIO Fellow pager # [phone]  MAURICIO Attending phone # [phone]  Nephrology office # [phone]  -----------------------------------------------------------------------------  Chief Complaint:/subjective:  84 year old F with PMHx of bronchiectasis, anxiety, HTN sent to [facility] on 8/24 after found to have hyponatremia on outpatient labs done by PCP. Nephrology team being consulted for hyponatremia.    Upon review of labs on [facility] HIE/[place], pt with previous episodes of hyponatremia in the past. On admission, pt with SNa: 126 (8/24/20 at 6:22 PM). Patient was placed on fluid restriction and started on Salt tabs 2gms daily. Patient with repeat Na levels stable at 124-125.  Patient also on Sertraline as outpatient.   Patient seen today, Pt currently denies fevers, chills, chest pain, dysuria, SOB, abdominal pain        24 hour events:    started on lasix and Na tabs increased       PAST HISTORY  --------------------------------------------------------------------------------  No significant changes to PMH, PSH, FHx, SHx, unless otherwise noted    ALLERGIES & MEDICATIONS  --------------------------------------------------------------------------------  Allergies    No Known Allergies    Intolerances      Standing Inpatient Medications  amLODIPine   Tablet 10 milliGRAM(s) Oral daily  aspirin enteric coated 81 milliGRAM(s) Oral daily  budesonide 160 MICROgram(s)/formoterol 4.5 MICROgram(s) Inhaler 2 Puff(s) Inhalation two times a day  furosemide    Tablet 20 milliGRAM(s) Oral daily  heparin   Injectable 5000 Unit(s) SubCutaneous every 8 hours  losartan 100 milliGRAM(s) Oral daily  metoprolol succinate  milliGRAM(s) Oral daily  sertraline 25 milliGRAM(s) Oral at bedtime  simethicone 80 milliGRAM(s) Chew daily  sodium chloride 2 Gram(s) Oral two times a day    PRN Inpatient Medications  ALBUTerol    90 MICROgram(s) HFA Inhaler 2 Puff(s) Inhalation every 6 hours PRN      VITALS/PHYSICAL EXAM  --------------------------------------------------------------------------------  T(C): 36.8 (08-27-20 @ 13:51), Max: 36.9 (08-27-20 @ 12:42)  HR: 88 (08-27-20 @ 13:51) (78 - 88)  BP: 150/51 (08-27-20 @ 13:51) (128/57 - 150/51)  RR: 18 (08-27-20 @ 13:51) (18 - 18)  SpO2: 99% (08-27-20 @ 13:51) (95% - 99%)  Wt(kg): --        08-26-20 @ 07:01  -  08-27-20 @ 07:00  --------------------------------------------------------  IN: 0 mL / OUT: 300 mL / NET: -300 mL    08-27-20 @ 07:01  -  08-27-20 @ 19:48  --------------------------------------------------------  IN: 470 mL / OUT: 300 mL / NET: 170 mL      Physical Exam:  	Gen: NAD, well-appearing  	HEENT: no JVD  	Pulm: occasional dry crackles on the left lung  	CV:  S1S2  	Abd: +BS, soft   	Ext: trace B/L Lower ext edema  	Neuro: No focal deficits  	Skin: Warm, without rashes  	Vascular access: peripheral lines  	  LABS/STUDIES  --------------------------------------------------------------------------------              11.3   9.72  >-----------<  335      [08-27-20 @ 06:35]              30.8     Hemoglobin: 11.3 g/dL (08-27-20 @ 06:35)  Hemoglobin: 11.7 g/dL (08-26-20 @ 06:14)    Platelet Count - Automated: 335 K/uL (08-27-20 @ 06:35)  Platelet Count - Automated: 359 K/uL (08-26-20 @ 06:14)    127  |  92  |  15  ----------------------------<  150      [08-27-20 @ 06:35]  4.0   |  25  |  0.59        Ca     8.5     [08-27-20 @ 06:35]      Mg     1.9     [08-27-20 @ 06:35]      Phos  3.0     [08-27-20 @ 06:35]          Uric acid 2.4      [08-26-20 @ 06:14]    Creatinine, Serum: 0.59 mg/dL (08-27-20 @ 06:35)  Creatinine, Serum: 0.67 mg/dL (08-26-20 @ 22:10)  Creatinine, Serum: 0.63 mg/dL (08-26-20 @ 13:54)  Creatinine, Serum: 0.61 mg/dL (08-26-20 @ 06:14)  Creatinine, Serum: 0.67 mg/dL (08-25-20 @ 23:15)  Creatinine, Serum: 0.54 mg/dL (08-25-20 @ 14:35)  Creatinine, Serum: 0.49 mg/dL (08-25-20 @ 06:00)  Creatinine, Serum: 0.65 mg/dL (08-24-20 @ 18:22)    SODIUM TREND:  Sodium 127 [08-27 @ 06:35]  Sodium 127 [08-26 @ 22:10]  Sodium 126 [08-26 @ 13:54]  Sodium 125 [08-26 @ 06:14]  Sodium 125 [08-25 @ 23:15]  Sodium 125 [08-25 @ 14:35]  Sodium 124 [08-25 @ 06:00]  Sodium 126 [08-24 @ 18:22]        SCr 0.59 [08-27 @ 06:35]  SCr 0.67 [08-26 @ 22:10]  SCr 0.63 [08-26 @ 13:54]  SCr 0.61 [08-26 @ 06:14]  SCr 0.67 [08-25 @ 23:15]    Urinalysis - [08-24-20 @ 20:45]      Color COLORLESS / Appearance CLEAR / SG 1.009 / pH 7.0      Gluc NEGATIVE / Ketone NEGATIVE  / Bili NEGATIVE / Urobili NORMAL       Blood NEGATIVE / Protein NEGATIVE / Leuk Est NEGATIVE / Nitrite NEGATIVE      RBC  / WBC  / Hyaline  / Gran  / Sq Epi  / Non Sq Epi  / Bacteria     Urine Creatinine 60.00      [08-26-20 @ 09:20]  Urine Sodium 83      [08-26-20 @ 09:20]  Urine Urea Nitrogen 251      [08-24-20 @ 20:45]  Urine Potassium 32.8      [08-26-20 @ 09:20]  Urine Chloride 67      [08-26-20 @ 09:20]  Urine Osmolality 361      [08-26-20 @ 09:20]    HbA1c 5.7      [07-24-18 @ 08:18]  TSH 1.20      [08-24-20 @ 18:22]

## 2020-08-27 NOTE — PROGRESS NOTE ADULT - PROBLEM SELECTOR PLAN 2
Treated outpt with 7d doxycycline with steroid taper.  - c/w prednisone taper (day 8/9)  - BCx drawn 08/25 grew anaerobic gram positive rods. Likely due to contamination, will f/u other culture drawn the same day. Recent course of 7 days doxycycline with steroid taper  - S/p prednisone taper  - BCx drawn 08/25 grew anaerobic gram positive rods. Aerobic bottles clear --> likely contaminant

## 2020-08-27 NOTE — PROGRESS NOTE ADULT - ASSESSMENT
84 year old woman with a history of bronchiectasis admitted for hyponatremia in the setting of bronchiectasis exacerbation. 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 Currently titrating with target <6-8 meq/24 hour correction. 84F PMHx bronchiectasis admitted for hyponatremia in the setting of bronchiectasis exacerbation. 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 Currently titrating with target <6-8 meq/24 hour correction.

## 2020-08-27 NOTE — PROGRESS NOTE ADULT - ATTENDING COMMENTS
84F with bronchiectasis presented with hyponatremia secondary to SIADH. started on Salt tab and Lasix, slight improvement. increase salt tabs to bid, c/w furosemide, daily BMP, avoid overcorrection. nephrology follow, f/u recs.

## 2020-08-27 NOTE — PROGRESS NOTE ADULT - ASSESSMENT
Pt with hypo-osmolar hyponatremia. SIADH ( SSRI likely contributing)        Problem/Recommendation - 1:  Problem: Hyponatremia. Recommendation:   Pt with hypo-osmolar hyponatremia in the setting of increased free water intake, (SSRI) and possible SIADH   Pt with Serum osm: 271, UOsm: 361 with Junito: 83. Patient currently euvolemic.   Pt on salt tabs 2gm now BID     oral Lasix 20mg daily.  can increase to BID tomorrow if SOB or Na not improving   Fluid restrict 1L/day.   Monitor serum Na every 6-8 hours.   Avoid overcorrection to no more that 6-8 meq/day.

## 2020-08-28 LAB
ANION GAP SERPL CALC-SCNC: 10 MMO/L — SIGNIFICANT CHANGE UP (ref 7–14)
ANION GAP SERPL CALC-SCNC: 12 MMO/L — SIGNIFICANT CHANGE UP (ref 7–14)
BUN SERPL-MCNC: 16 MG/DL — SIGNIFICANT CHANGE UP (ref 7–23)
BUN SERPL-MCNC: 17 MG/DL — SIGNIFICANT CHANGE UP (ref 7–23)
CALCIUM SERPL-MCNC: 8.6 MG/DL — SIGNIFICANT CHANGE UP (ref 8.4–10.5)
CALCIUM SERPL-MCNC: 8.9 MG/DL — SIGNIFICANT CHANGE UP (ref 8.4–10.5)
CHLORIDE SERPL-SCNC: 100 MMOL/L — SIGNIFICANT CHANGE UP (ref 98–107)
CHLORIDE SERPL-SCNC: 97 MMOL/L — LOW (ref 98–107)
CHLORIDE UR-SCNC: 117 MMOL/L — SIGNIFICANT CHANGE UP
CO2 SERPL-SCNC: 24 MMOL/L — SIGNIFICANT CHANGE UP (ref 22–31)
CO2 SERPL-SCNC: 24 MMOL/L — SIGNIFICANT CHANGE UP (ref 22–31)
CREAT SERPL-MCNC: 0.57 MG/DL — SIGNIFICANT CHANGE UP (ref 0.5–1.3)
CREAT SERPL-MCNC: 0.73 MG/DL — SIGNIFICANT CHANGE UP (ref 0.5–1.3)
GLUCOSE BLDC GLUCOMTR-MCNC: 187 MG/DL — HIGH (ref 70–99)
GLUCOSE BLDC GLUCOMTR-MCNC: 222 MG/DL — HIGH (ref 70–99)
GLUCOSE SERPL-MCNC: 144 MG/DL — HIGH (ref 70–99)
GLUCOSE SERPL-MCNC: 177 MG/DL — HIGH (ref 70–99)
HCT VFR BLD CALC: 37.3 % — SIGNIFICANT CHANGE UP (ref 34.5–45)
HGB BLD-MCNC: 12.6 G/DL — SIGNIFICANT CHANGE UP (ref 11.5–15.5)
MAGNESIUM SERPL-MCNC: 1.7 MG/DL — SIGNIFICANT CHANGE UP (ref 1.6–2.6)
MAGNESIUM SERPL-MCNC: 1.9 MG/DL — SIGNIFICANT CHANGE UP (ref 1.6–2.6)
MCHC RBC-ENTMCNC: 29.4 PG — SIGNIFICANT CHANGE UP (ref 27–34)
MCHC RBC-ENTMCNC: 33.8 % — SIGNIFICANT CHANGE UP (ref 32–36)
MCV RBC AUTO: 87.1 FL — SIGNIFICANT CHANGE UP (ref 80–100)
NRBC # FLD: 0 K/UL — SIGNIFICANT CHANGE UP (ref 0–0)
OSMOLALITY UR: 451 MOSMO/KG — SIGNIFICANT CHANGE UP (ref 50–1200)
PHOSPHATE SERPL-MCNC: 2.9 MG/DL — SIGNIFICANT CHANGE UP (ref 2.5–4.5)
PHOSPHATE SERPL-MCNC: 3.3 MG/DL — SIGNIFICANT CHANGE UP (ref 2.5–4.5)
PLATELET # BLD AUTO: 399 K/UL — SIGNIFICANT CHANGE UP (ref 150–400)
PMV BLD: 8.8 FL — SIGNIFICANT CHANGE UP (ref 7–13)
POTASSIUM SERPL-MCNC: 3.5 MMOL/L — SIGNIFICANT CHANGE UP (ref 3.5–5.3)
POTASSIUM SERPL-MCNC: 3.8 MMOL/L — SIGNIFICANT CHANGE UP (ref 3.5–5.3)
POTASSIUM SERPL-SCNC: 3.5 MMOL/L — SIGNIFICANT CHANGE UP (ref 3.5–5.3)
POTASSIUM SERPL-SCNC: 3.8 MMOL/L — SIGNIFICANT CHANGE UP (ref 3.5–5.3)
POTASSIUM UR-SCNC: 32.9 MMOL/L — SIGNIFICANT CHANGE UP
RBC # BLD: 4.28 M/UL — SIGNIFICANT CHANGE UP (ref 3.8–5.2)
RBC # FLD: 12.5 % — SIGNIFICANT CHANGE UP (ref 10.3–14.5)
SODIUM SERPL-SCNC: 133 MMOL/L — LOW (ref 135–145)
SODIUM SERPL-SCNC: 134 MMOL/L — LOW (ref 135–145)
SODIUM UR-SCNC: 122 MMOL/L — SIGNIFICANT CHANGE UP
WBC # BLD: 11.87 K/UL — HIGH (ref 3.8–10.5)
WBC # FLD AUTO: 11.87 K/UL — HIGH (ref 3.8–10.5)

## 2020-08-28 PROCEDURE — 99233 SBSQ HOSP IP/OBS HIGH 50: CPT | Mod: GC

## 2020-08-28 PROCEDURE — 99232 SBSQ HOSP IP/OBS MODERATE 35: CPT | Mod: GC

## 2020-08-28 RX ORDER — SODIUM CHLORIDE 9 MG/ML
500 INJECTION INTRAMUSCULAR; INTRAVENOUS; SUBCUTANEOUS
Refills: 0 | Status: DISCONTINUED | OUTPATIENT
Start: 2020-08-28 | End: 2020-08-29

## 2020-08-28 RX ORDER — MECLIZINE HCL 12.5 MG
12.5 TABLET ORAL ONCE
Refills: 0 | Status: COMPLETED | OUTPATIENT
Start: 2020-08-28 | End: 2020-08-28

## 2020-08-28 RX ORDER — FUROSEMIDE 40 MG
1 TABLET ORAL
Qty: 30 | Refills: 0
Start: 2020-08-28 | End: 2020-09-26

## 2020-08-28 RX ORDER — FUROSEMIDE 40 MG
20 TABLET ORAL DAILY
Refills: 0 | Status: DISCONTINUED | OUTPATIENT
Start: 2020-08-28 | End: 2020-08-29

## 2020-08-28 RX ORDER — LANOLIN ALCOHOL/MO/W.PET/CERES
3 CREAM (GRAM) TOPICAL AT BEDTIME
Refills: 0 | Status: COMPLETED | OUTPATIENT
Start: 2020-08-28 | End: 2020-08-28

## 2020-08-28 RX ORDER — ONDANSETRON 8 MG/1
4 TABLET, FILM COATED ORAL ONCE
Refills: 0 | Status: COMPLETED | OUTPATIENT
Start: 2020-08-28 | End: 2020-08-28

## 2020-08-28 RX ORDER — SERTRALINE 25 MG/1
125 TABLET, FILM COATED ORAL AT BEDTIME
Refills: 0 | Status: DISCONTINUED | OUTPATIENT
Start: 2020-08-28 | End: 2020-08-29

## 2020-08-28 RX ADMIN — Medication 81 MILLIGRAM(S): at 11:37

## 2020-08-28 RX ADMIN — Medication 20 MILLIGRAM(S): at 06:45

## 2020-08-28 RX ADMIN — SODIUM CHLORIDE 100 MILLILITER(S): 9 INJECTION INTRAMUSCULAR; INTRAVENOUS; SUBCUTANEOUS at 12:44

## 2020-08-28 RX ADMIN — BUDESONIDE AND FORMOTEROL FUMARATE DIHYDRATE 2 PUFF(S): 160; 4.5 AEROSOL RESPIRATORY (INHALATION) at 21:54

## 2020-08-28 RX ADMIN — HEPARIN SODIUM 5000 UNIT(S): 5000 INJECTION INTRAVENOUS; SUBCUTANEOUS at 22:07

## 2020-08-28 RX ADMIN — Medication 100 MILLIGRAM(S): at 06:45

## 2020-08-28 RX ADMIN — HEPARIN SODIUM 5000 UNIT(S): 5000 INJECTION INTRAVENOUS; SUBCUTANEOUS at 14:03

## 2020-08-28 RX ADMIN — AMLODIPINE BESYLATE 10 MILLIGRAM(S): 2.5 TABLET ORAL at 11:38

## 2020-08-28 RX ADMIN — Medication 3 MILLIGRAM(S): at 22:01

## 2020-08-28 RX ADMIN — BUDESONIDE AND FORMOTEROL FUMARATE DIHYDRATE 2 PUFF(S): 160; 4.5 AEROSOL RESPIRATORY (INHALATION) at 09:48

## 2020-08-28 RX ADMIN — ONDANSETRON 4 MILLIGRAM(S): 8 TABLET, FILM COATED ORAL at 12:44

## 2020-08-28 RX ADMIN — LOSARTAN POTASSIUM 100 MILLIGRAM(S): 100 TABLET, FILM COATED ORAL at 06:45

## 2020-08-28 RX ADMIN — SODIUM CHLORIDE 2 GRAM(S): 9 INJECTION INTRAMUSCULAR; INTRAVENOUS; SUBCUTANEOUS at 06:45

## 2020-08-28 RX ADMIN — Medication 12.5 MILLIGRAM(S): at 14:03

## 2020-08-28 RX ADMIN — SIMETHICONE 80 MILLIGRAM(S): 80 TABLET, CHEWABLE ORAL at 11:38

## 2020-08-28 RX ADMIN — SODIUM CHLORIDE 2 GRAM(S): 9 INJECTION INTRAMUSCULAR; INTRAVENOUS; SUBCUTANEOUS at 17:11

## 2020-08-28 RX ADMIN — HEPARIN SODIUM 5000 UNIT(S): 5000 INJECTION INTRAVENOUS; SUBCUTANEOUS at 06:44

## 2020-08-28 NOTE — PHYSICAL THERAPY INITIAL EVALUATION ADULT - PERTINENT HX OF CURRENT PROBLEM, REHAB EVAL
Colleen Chowlioaubrie is an 84 year old woman with a history of bronchiectasis, anxiety, HTN who presents for hyponatremia.

## 2020-08-28 NOTE — PROGRESS NOTE ADULT - PROBLEM SELECTOR PLAN 4
- c/w home losartan, metoprolol, amlodipine with hold parameters  - -142 - c/w home losartan, metoprolol, amlodipine with hold parameters  - -152

## 2020-08-28 NOTE — PHYSICAL THERAPY INITIAL EVALUATION ADULT - DISCHARGE PLANNER MADE AWARE
yes
Alert and oriented to person, place, time/situation. normal mood and affect. no apparent risk to self or others.

## 2020-08-28 NOTE — PROVIDER CONTACT NOTE (OTHER) - ACTION/TREATMENT ORDERED:
vitals, FS taken. MD notified. zofran given and IV fluids.  repeat BM at 8PM. will continue to monitor

## 2020-08-28 NOTE — PROGRESS NOTE ADULT - SUBJECTIVE AND OBJECTIVE BOX
[facility] Division of Kidney Diseases & Hypertension  FOLLOW UP NOTE  [phone]--------------------------------------------------------------------------------  24 hour events/subjective:  Patient was seen and examined today. Pt reports feeling weak however denies any other complaints.  No chest pain, abdominal pain, SOB, nausea/vomiting  No acute events overnight      PAST HISTORY  --------------------------------------------------------------------------------  No significant changes to PMH, PSH, FHx, SHx, unless otherwise noted    ALLERGIES & MEDICATIONS  --------------------------------------------------------------------------------  Allergies    No Known Allergies    Intolerances      Standing Inpatient Medications  amLODIPine   Tablet 10 milliGRAM(s) Oral daily  aspirin enteric coated 81 milliGRAM(s) Oral daily  budesonide 160 MICROgram(s)/formoterol 4.5 MICROgram(s) Inhaler 2 Puff(s) Inhalation two times a day  heparin   Injectable 5000 Unit(s) SubCutaneous every 8 hours  losartan 100 milliGRAM(s) Oral daily  meclizine 12.5 milliGRAM(s) Oral once  metoprolol succinate  milliGRAM(s) Oral daily  sertraline 25 milliGRAM(s) Oral at bedtime  simethicone 80 milliGRAM(s) Chew daily  sodium chloride 2 Gram(s) Oral two times a day  sodium chloride 0.9%. 500 milliLiter(s) IV Continuous <Continuous>    PRN Inpatient Medications  ALBUTerol    90 MICROgram(s) HFA Inhaler 2 Puff(s) Inhalation every 6 hours PRN        VITALS/PHYSICAL EXAM  --------------------------------------------------------------------------------  T(C): 36.6 (08-28-20 @ 11:36), Max: 36.9 (08-27-20 @ 21:13)  HR: 83 (08-28-20 @ 12:03) (81 - 88)  BP: 134/59 (08-28-20 @ 12:03) (126/57 - 152/51)  RR: 26 (08-28-20 @ 12:03) (17 - 26)  SpO2: 100% (08-28-20 @ 12:03) (96% - 100%)  Wt(kg): --        08-27-20 @ 07:01  -  08-28-20 @ 07:00  --------------------------------------------------------  IN: 590 mL / OUT: 500 mL / NET: 90 mL    08-28-20 @ 07:01  -  08-28-20 @ 13:20  --------------------------------------------------------  IN: 0 mL / OUT: 400 mL / NET: -400 mL      Physical Exam:  	Gen: NAD, well-appearing  	HEENT: no JVD  	Pulm: occasional dry crackles on the left lung  	CV:  S1S2  	Abd: +BS, soft   	Ext: trace B/L Lower ext edema  	Neuro: No focal deficits  	Skin: Warm, without rashes  	Vascular access: peripheral lines    LABS/STUDIES  --------------------------------------------------------------------------------              12.6   11.87 >-----------<  399      [08-28-20 @ 04:58]              37.3     133  |  97  |  16  ----------------------------<  144      [08-28-20 @ 04:58]  3.8   |  24  |  0.57        Ca     8.9     [08-28-20 @ 04:58]      Mg     1.9     [08-28-20 @ 04:58]      Phos  3.3     [08-28-20 @ 04:58]      Creatinine Trend:  SCr 0.57 [08-28 @ 04:58]  SCr 0.59 [08-27 @ 06:35]  SCr 0.67 [08-26 @ 22:10]  SCr 0.63 [08-26 @ 13:54]  SCr 0.61 [08-26 @ 06:14]    Urinalysis - [08-24-20 @ 20:45]      Color COLORLESS / Appearance CLEAR / SG 1.009 / pH 7.0      Gluc NEGATIVE / Ketone NEGATIVE  / Bili NEGATIVE / Urobili NORMAL       Blood NEGATIVE / Protein NEGATIVE / Leuk Est NEGATIVE / Nitrite NEGATIVE      RBC  / WBC  / Hyaline  / Gran  / Sq Epi  / Non Sq Epi  / Bacteria     Urine Creatinine 60.00      [08-26-20 @ 09:20]  Urine Sodium 122      [08-28-20 @ 08:45]  Urine Urea Nitrogen 251      [08-24-20 @ 20:45]  Urine Potassium 32.9      [08-28-20 @ 08:45]  Urine Chloride 117      [08-28-20 @ 08:45]  Urine Osmolality 451      [08-28-20 @ 08:45]    TSH 1.20      [08-24-20 @ 18:22]

## 2020-08-28 NOTE — PROGRESS NOTE ADULT - ATTENDING COMMENTS
Patient was seen and examined personally by me. I have discussed the plan and reviewed the Resident's note and agree with the above physical exam findings including assessment and plan except as indicated below. Labs and imagining reviewed.     Patient with SIADH. currently Salt tab with lasix. Na much improved today. discussed with nephrology, clear for discharge on current regimen. PCP follow up in 1 week for repeat BMP Patient was seen and examined personally by me. I have discussed the plan and reviewed the Resident's note and agree with the above physical exam findings including assessment and plan except as indicated below. Labs and imagining reviewed.     Patient with SIADH. currently Salt tab with lasix. Na much improved today. discussed with nephrology, clear for discharge on current regimen. PCP follow up in 1 week for repeat BMP.     45 minutes spent coordinating discharge

## 2020-08-28 NOTE — PROGRESS NOTE ADULT - PROBLEM SELECTOR PLAN 1
Pt with hypo-osmolar hyponatremia in the setting of increased free water intake, (SSRI) and possible SIADH?. Pt with Serum osm: 271, UOsm: 361 with Junito: 83. Patient currently euvolemic. Pt on salt tabs 2gm BID. Continue Lasix 20mg daily. Can decreased antihypertensives while on diuretics. Fluid restrict 1L/day. Monitor serum Na daily.     If you have any questions, please feel free to contact me  Adonay Mortensen  Nephrology Fellow  Pager: [phone] / [phone]  (After 5pm or on weekends please page the on-call fellow).

## 2020-08-28 NOTE — PROGRESS NOTE ADULT - SUBJECTIVE AND OBJECTIVE BOX
Tyler Rooney MD, PGY1  Pager [phone]    PROGRESS NOTE:     Patient is a 84y old  Female who presents with a chief complaint of CC: hyponatremia (27 Aug 2020 19:48)      SUBJECTIVE / OVERNIGHT EVENTS:    REVIEW OF SYSTEMS:    CONSTITUTIONAL: +Mild weakness. No fatigue. No fever.  HEAD: No head trauma.   EYES: No vision changes.  ENT: No hearing changes or tinnitus. No ear pain. No changes in smell. No nasal congestion or discharge. No sore throat. No voice hoarseness.   NECK: No neck pain or stiffness. No lumps.  RESPIRATORY: No cough. +Mild SOB. No wheezing. No hemoptysis.   CARDIOVASCULAR: No chest pain. No palpitations.   GASTROINTESTINAL: No dysphagia. No ABD pain. No distension. No constipation. No diarrhea. No pain with defecation. No hematemesis. No hematochezia or melena.  BACK: No back pain.  GENITOURINARY: No dysuria. No frequency or urgency. No hesitancy. No incontinence. No urinary retention. No suprapubic pain. No hematuria.  EXTREMITY: No swelling.  MUSCULOSKELETAL: No joint pain or swelling. No fractures. No stiffness.    SKIN: No rashes. No itching. No skin, hair, or nail changes.  NEUROLOGICAL: No weakness or paralysis. No lightheadedness or dizziness. No HA. No numbness or tingling.   PSYCHIATRIC: No depression.       MEDICATIONS  (STANDING):  amLODIPine   Tablet 10 milliGRAM(s) Oral daily  aspirin enteric coated 81 milliGRAM(s) Oral daily  budesonide 160 MICROgram(s)/formoterol 4.5 MICROgram(s) Inhaler 2 Puff(s) Inhalation two times a day  furosemide    Tablet 20 milliGRAM(s) Oral daily  heparin   Injectable 5000 Unit(s) SubCutaneous every 8 hours  losartan 100 milliGRAM(s) Oral daily  metoprolol succinate  milliGRAM(s) Oral daily  sertraline 25 milliGRAM(s) Oral at bedtime  simethicone 80 milliGRAM(s) Chew daily  sodium chloride 2 Gram(s) Oral two times a day    MEDICATIONS  (PRN):  ALBUTerol    90 MICROgram(s) HFA Inhaler 2 Puff(s) Inhalation every 6 hours PRN Shortness of Breath and/or Wheezing      CAPILLARY BLOOD GLUCOSE        I&O's Summary    27 Aug 2020 07:01  -  28 Aug 2020 07:00  --------------------------------------------------------  IN: 470 mL / OUT: 300 mL / NET: 170 mL        PHYSICAL EXAM:  Vital Signs Last 24 Hrs  T(C): 36.8 (28 Aug 2020 06:41), Max: 36.9 (27 Aug 2020 12:42)  T(F): 98.2 (28 Aug 2020 06:41), Max: 98.4 (27 Aug 2020 12:42)  HR: 86 (28 Aug 2020 06:41) (78 - 88)  BP: 152/51 (28 Aug 2020 06:41) (128/57 - 152/51)  BP(mean): --  RR: 17 (28 Aug 2020 06:41) (17 - 18)  SpO2: 98% (28 Aug 2020 06:41) (96% - 99%)      CONSTITUTIONAL: No acute distress. Awake and alert.  EYES: No scleral icterus. No conjunctival injection.  ENT: Moist oral mucosa. No erythema. No pharyngeal exudates.   RESPIRATORY: CTAB. No wheezes, rales, or rhonchi. No accessory muscle use. No apparent respiratory distress.  CARDIOVASCULAR: +S1/S2. No audible S3/S4. Regular rate and rhythm. No murmurs, rubs, or gallops.    GASTROINTESTINAL: Soft, nontender, nondistended. +BS. No rebound or guarding.   BACK: No spinal or paraspinal tenderness. No CVA tenderness.  EXTREMITY: No LE swelling or edema. EXTs warm to touch.  MUSCULOSKELETAL: Spontaneous movement in all extremities.  DERMATOLOGICAL: No abnormal rashes or lesions.  NEUROLOGICAL: Nonfocal.  PSYCHIATRIC: Appropriate affect.    LABS:                        11.3   9.72  )-----------( 335      ( 27 Aug 2020 06:35 )             30.8     08-27    127<L>  |  92<L>  |  15  ----------------------------<  150<H>  4.0   |  25  |  0.59    Ca    8.5      27 Aug 2020 06:35  Phos  3.0     08-27  Mg     1.9     08-27                Culture - Blood (collected 25 Aug 2020 10:14)  Source: .Blood Blood-Peripheral  Preliminary Report (26 Aug 2020 11:01):    No growth to date.        RADIOLOGY & ADDITIONAL TESTS:  Results Reviewed:   Imaging Personally Reviewed:  Electrocardiogram Personally Reviewed:    COORDINATION OF CARE:  Care Discussed with Consultants/Other Providers [Y/N]:  Prior or Outpatient Records Reviewed [Y/N]: Tyler Rooney MD, PGY1  Pager [phone]    PROGRESS NOTE:     Patient is a 84y old  Female who presents with a chief complaint of CC: hyponatremia (27 Aug 2020 19:48)      SUBJECTIVE / OVERNIGHT EVENTS: No acute events overnight. Patient said that she is feeling "weak" but about the same as yesterday. Says that she is somewhat short of breath, but says that she has SOB typically and appears to be at her baseline. Eating and drinking well with no issues.    REVIEW OF SYSTEMS:    CONSTITUTIONAL: +Mild weakness. No fatigue. No fever.  RESPIRATORY: Occaisional cough. +Mild SOB. No wheezing. No hemoptysis.   CARDIOVASCULAR: No chest pain. No palpitations.   GASTROINTESTINAL: No dysphagia. No ABD pain. No distension. No constipation. No diarrhea. No pain with defecation. No hematemesis. No hematochezia or melena.  BACK: No back pain.  GENITOURINARY: No dysuria. No frequency or urgency. No hesitancy. No incontinence. No urinary retention. No suprapubic pain. No hematuria.  NEUROLOGICAL: No weakness or paralysis. No lightheadedness or dizziness. No HA. No numbness or tingling.     MEDICATIONS  (STANDING):  amLODIPine   Tablet 10 milliGRAM(s) Oral daily  aspirin enteric coated 81 milliGRAM(s) Oral daily  budesonide 160 MICROgram(s)/formoterol 4.5 MICROgram(s) Inhaler 2 Puff(s) Inhalation two times a day  furosemide    Tablet 20 milliGRAM(s) Oral daily  heparin   Injectable 5000 Unit(s) SubCutaneous every 8 hours  losartan 100 milliGRAM(s) Oral daily  metoprolol succinate  milliGRAM(s) Oral daily  sertraline 25 milliGRAM(s) Oral at bedtime  simethicone 80 milliGRAM(s) Chew daily  sodium chloride 2 Gram(s) Oral two times a day    MEDICATIONS  (PRN):  ALBUTerol    90 MICROgram(s) HFA Inhaler 2 Puff(s) Inhalation every 6 hours PRN Shortness of Breath and/or Wheezing      CAPILLARY BLOOD GLUCOSE        I&O's Summary    27 Aug 2020 07:01  -  28 Aug 2020 07:00  --------------------------------------------------------  IN: 470 mL / OUT: 300 mL / NET: 170 mL        PHYSICAL EXAM:  Vital Signs Last 24 Hrs  T(C): 36.8 (28 Aug 2020 06:41), Max: 36.9 (27 Aug 2020 12:42)  T(F): 98.2 (28 Aug 2020 06:41), Max: 98.4 (27 Aug 2020 12:42)  HR: 86 (28 Aug 2020 06:41) (78 - 88)  BP: 152/51 (28 Aug 2020 06:41) (128/57 - 152/51)  BP(mean): --  RR: 17 (28 Aug 2020 06:41) (17 - 18)  SpO2: 98% (28 Aug 2020 06:41) (96% - 99%)      CONSTITUTIONAL: lying in bed in NAD  RESPIRATORY: normal respiratory effort; crackles in lower lobes b/l  CARDIOVASCULAR: regular rate and rhythm, normal S1 and S2, no murmur/rub/gallop; no lower extremity edema; peripheral pulses are 2+ bilaterally  ABDOMEN: nontender to palpation, normoactive bowel sounds, no rebound/guarding; no hepatosplenomegaly  MUSCLOSKELETAL: no clubbing or cyanosis of digits; no joint swelling or tenderness to palpation  PSYCH: A+O to person, place, and time; affect appropriate    LABS:                        11.3   9.72  )-----------( 335      ( 27 Aug 2020 06:35 )             30.8     08-27    127<L>  |  92<L>  |  15  ----------------------------<  150<H>  4.0   |  25  |  0.59    Ca    8.5      27 Aug 2020 06:35  Phos  3.0     08-27  Mg     1.9     08-27                Culture - Blood (collected 25 Aug 2020 10:14)  Source: .Blood Blood-Peripheral  Preliminary Report (26 Aug 2020 11:01):    No growth to date.        RADIOLOGY & ADDITIONAL TESTS:  Results Reviewed:   Imaging Personally Reviewed:  Electrocardiogram Personally Reviewed:    COORDINATION OF CARE:  Care Discussed with Consultants/Other Providers [Y/N]:  Prior or Outpatient Records Reviewed [Y/N]:

## 2020-08-28 NOTE — PROGRESS NOTE ADULT - PROBLEM SELECTOR PLAN 2
Recent course of 7 days doxycycline with steroid taper  - S/p prednisone taper  - BCx drawn 08/25 grew anaerobic gram positive rods. Aerobic bottles clear --> likely contaminant

## 2020-08-28 NOTE — PROGRESS NOTE ADULT - PROBLEM SELECTOR PLAN 1
Hypo-osmolar hyponatremia. Serum osm 272, urine osm 298, Junito 92. 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  - Target <6-8 meq/24 hour correction; Na 127  - Strict I&Os  - Fluid restriction - 1L daily  - Continue to encourage  - Simethicone for gas pain  - Increase salt tabs to 2g BID  - C/w 20mg lasix PO  - Nephro consulted; appreciate recs  - TTE - EF 61%; NML LV systolic function, NML RV systolic function Hypo-osmolar hyponatremia. Serum osm 272, urine osm 298, Junito 92. 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  - Target <6-8 meq/24 hour correction; Na 133 this AM  - Strict I&Os  - Fluid restriction - 1L daily  - Continue to encourage  - Simethicone for gas pain  - C/w salt tabs to 2g BID  - C/w 20mg lasix PO  - Nephro consulted; appreciate recs  - TTE - EF 61%; NML LV systolic function, NML RV systolic function

## 2020-08-28 NOTE — PHYSICAL THERAPY INITIAL EVALUATION ADULT - ADDITIONAL COMMENTS
Pt lives with her grandson in private home, there are 4 steps to enter with railing and none inside. Pt was ambulating with cane prior to admission. Pt was independent in all ADL prior to admission. Pt states she owns a shower chair and grab bars in bathroom.

## 2020-08-28 NOTE — PROGRESS NOTE ADULT - PROBLEM SELECTOR PLAN 5
DVT PPX: Improve score 1 for age - SQH  Diet: Regular diet wit 1L fluid restriction  Disposition: Pending clinical course DVT PPX: Improve score 1 for age - SQH  Diet: Regular diet with 1L fluid restriction  Disposition: Pending clinical course

## 2020-08-28 NOTE — PROGRESS NOTE ADULT - ASSESSMENT
84F PMHx bronchiectasis admitted for hyponatremia in the setting of bronchiectasis exacerbation. 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 Currently titrating with target <6-8 meq/24 hour correction.

## 2020-08-29 ENCOUNTER — TRANSCRIPTION ENCOUNTER (OUTPATIENT)
Age: 85
End: 2020-08-29

## 2020-08-29 VITALS
RESPIRATION RATE: 17 BRPM | SYSTOLIC BLOOD PRESSURE: 135 MMHG | HEART RATE: 77 BPM | TEMPERATURE: 98 F | DIASTOLIC BLOOD PRESSURE: 52 MMHG | OXYGEN SATURATION: 98 %

## 2020-08-29 LAB
ANION GAP SERPL CALC-SCNC: 10 MMO/L — SIGNIFICANT CHANGE UP (ref 7–14)
BUN SERPL-MCNC: 15 MG/DL — SIGNIFICANT CHANGE UP (ref 7–23)
CALCIUM SERPL-MCNC: 9.2 MG/DL — SIGNIFICANT CHANGE UP (ref 8.4–10.5)
CHLORIDE SERPL-SCNC: 97 MMOL/L — LOW (ref 98–107)
CO2 SERPL-SCNC: 25 MMOL/L — SIGNIFICANT CHANGE UP (ref 22–31)
CREAT SERPL-MCNC: 0.58 MG/DL — SIGNIFICANT CHANGE UP (ref 0.5–1.3)
GLUCOSE SERPL-MCNC: 155 MG/DL — HIGH (ref 70–99)
HCT VFR BLD CALC: 34.1 % — LOW (ref 34.5–45)
HGB BLD-MCNC: 11.9 G/DL — SIGNIFICANT CHANGE UP (ref 11.5–15.5)
MAGNESIUM SERPL-MCNC: 1.9 MG/DL — SIGNIFICANT CHANGE UP (ref 1.6–2.6)
MCHC RBC-ENTMCNC: 30.4 PG — SIGNIFICANT CHANGE UP (ref 27–34)
MCHC RBC-ENTMCNC: 34.9 % — SIGNIFICANT CHANGE UP (ref 32–36)
MCV RBC AUTO: 87 FL — SIGNIFICANT CHANGE UP (ref 80–100)
NRBC # FLD: 0 K/UL — SIGNIFICANT CHANGE UP (ref 0–0)
PHOSPHATE SERPL-MCNC: 3.3 MG/DL — SIGNIFICANT CHANGE UP (ref 2.5–4.5)
PLATELET # BLD AUTO: 388 K/UL — SIGNIFICANT CHANGE UP (ref 150–400)
PMV BLD: 8.7 FL — SIGNIFICANT CHANGE UP (ref 7–13)
POTASSIUM SERPL-MCNC: 3.9 MMOL/L — SIGNIFICANT CHANGE UP (ref 3.5–5.3)
POTASSIUM SERPL-SCNC: 3.9 MMOL/L — SIGNIFICANT CHANGE UP (ref 3.5–5.3)
RBC # BLD: 3.92 M/UL — SIGNIFICANT CHANGE UP (ref 3.8–5.2)
RBC # FLD: 12.9 % — SIGNIFICANT CHANGE UP (ref 10.3–14.5)
SODIUM SERPL-SCNC: 132 MMOL/L — LOW (ref 135–145)
WBC # BLD: 9.87 K/UL — SIGNIFICANT CHANGE UP (ref 3.8–10.5)
WBC # FLD AUTO: 9.87 K/UL — SIGNIFICANT CHANGE UP (ref 3.8–10.5)

## 2020-08-29 PROCEDURE — 99239 HOSP IP/OBS DSCHRG MGMT >30: CPT | Mod: GC

## 2020-08-29 RX ORDER — AMLODIPINE BESYLATE 2.5 MG/1
1 TABLET ORAL
Qty: 0 | Refills: 0 | DISCHARGE

## 2020-08-29 RX ORDER — SODIUM CHLORIDE 9 MG/ML
2 INJECTION INTRAMUSCULAR; INTRAVENOUS; SUBCUTANEOUS
Qty: 120 | Refills: 0
Start: 2020-08-29 | End: 2020-09-27

## 2020-08-29 RX ADMIN — HEPARIN SODIUM 5000 UNIT(S): 5000 INJECTION INTRAVENOUS; SUBCUTANEOUS at 05:53

## 2020-08-29 RX ADMIN — Medication 100 MILLIGRAM(S): at 05:54

## 2020-08-29 RX ADMIN — SODIUM CHLORIDE 2 GRAM(S): 9 INJECTION INTRAMUSCULAR; INTRAVENOUS; SUBCUTANEOUS at 17:28

## 2020-08-29 RX ADMIN — BUDESONIDE AND FORMOTEROL FUMARATE DIHYDRATE 2 PUFF(S): 160; 4.5 AEROSOL RESPIRATORY (INHALATION) at 10:16

## 2020-08-29 RX ADMIN — Medication 81 MILLIGRAM(S): at 11:37

## 2020-08-29 RX ADMIN — SODIUM CHLORIDE 100 MILLILITER(S): 9 INJECTION INTRAMUSCULAR; INTRAVENOUS; SUBCUTANEOUS at 10:17

## 2020-08-29 RX ADMIN — LOSARTAN POTASSIUM 100 MILLIGRAM(S): 100 TABLET, FILM COATED ORAL at 05:53

## 2020-08-29 RX ADMIN — SODIUM CHLORIDE 2 GRAM(S): 9 INJECTION INTRAMUSCULAR; INTRAVENOUS; SUBCUTANEOUS at 05:54

## 2020-08-29 RX ADMIN — SIMETHICONE 80 MILLIGRAM(S): 80 TABLET, CHEWABLE ORAL at 11:37

## 2020-08-29 RX ADMIN — Medication 20 MILLIGRAM(S): at 05:53

## 2020-08-29 NOTE — PROGRESS NOTE ADULT - PROBLEM SELECTOR PLAN 6
Transition of Care Status:  1. Name of PCP: Preet Lugo  2. PCP contacted on admission: [  ] Y     [  ] N  3. PCP contacted at discharge: [  ] Y     [  ] N  4. Post-discharge appointment date and location:  5. Summary of handoff given to PCP:

## 2020-08-29 NOTE — DISCHARGE NOTE NURSING/CASE MANAGEMENT/SOCIAL WORK - NSDCFUADDAPPT_GEN_ALL_CORE_FT
Please follow up with your primary care doctor soon.    You may also wish to see a nephrologist to follow up on your hyponatremia (low sodium). You can discuss this your primary care doctor.

## 2020-08-29 NOTE — PROGRESS NOTE ADULT - SUBJECTIVE AND OBJECTIVE BOX
Tyler Rooney MD, PGY1  Pager [phone]    PROGRESS NOTE:     Patient is a 84y old  Female who presents with a chief complaint of CC: hyponatremia (28 Aug 2020 13:19)      SUBJECTIVE / OVERNIGHT EVENTS:  No acute events overnight. Patient says that she feels well with no complaints.    REVIEW OF SYSTEMS:    CONSTITUTIONAL: +Mild weakness. No fatigue. No fever.  RESPIRATORY: Occaisional cough. +Mild SOB. No wheezing. No hemoptysis.   CARDIOVASCULAR: No chest pain. No palpitations.   GASTROINTESTINAL: No dysphagia. No ABD pain. No distension. No constipation. No diarrhea. No pain with defecation. No hematemesis. No hematochezia or melena.  GENITOURINARY: No dysuria. No frequency or urgency. No hesitancy. No incontinence. No urinary retention. No suprapubic pain. No hematuria.  NEUROLOGICAL: No weakness or paralysis. No lightheadedness or dizziness. No HA. No numbness or tingling.       MEDICATIONS  (STANDING):  aspirin enteric coated 81 milliGRAM(s) Oral daily  budesonide 160 MICROgram(s)/formoterol 4.5 MICROgram(s) Inhaler 2 Puff(s) Inhalation two times a day  furosemide    Tablet 20 milliGRAM(s) Oral daily  heparin   Injectable 5000 Unit(s) SubCutaneous every 8 hours  losartan 100 milliGRAM(s) Oral daily  metoprolol succinate  milliGRAM(s) Oral daily  sertraline 125 milliGRAM(s) Oral at bedtime  simethicone 80 milliGRAM(s) Chew daily  sodium chloride 2 Gram(s) Oral two times a day  sodium chloride 0.9%. 500 milliLiter(s) (100 mL/Hr) IV Continuous <Continuous>    MEDICATIONS  (PRN):  ALBUTerol    90 MICROgram(s) HFA Inhaler 2 Puff(s) Inhalation every 6 hours PRN Shortness of Breath and/or Wheezing      CAPILLARY BLOOD GLUCOSE      POCT Blood Glucose.: 222 mg/dL (28 Aug 2020 22:13)  POCT Blood Glucose.: 187 mg/dL (28 Aug 2020 12:14)    I&O's Summary    28 Aug 2020 07:01  -  29 Aug 2020 07:00  --------------------------------------------------------  IN: 550 mL / OUT: 950 mL / NET: -400 mL        PHYSICAL EXAM:  Vital Signs Last 24 Hrs  T(C): 36.6 (29 Aug 2020 06:08), Max: 36.9 (28 Aug 2020 21:15)  T(F): 97.8 (29 Aug 2020 06:08), Max: 98.5 (28 Aug 2020 21:15)  HR: 80 (29 Aug 2020 06:08) (80 - 83)  BP: 138/62 (29 Aug 2020 06:08) (126/57 - 145/56)  BP(mean): 138 (29 Aug 2020 06:08) (138 - 138)  RR: 18 (29 Aug 2020 06:08) (17 - 26)  SpO2: 98% (29 Aug 2020 06:08) (98% - 100%)    CONSTITUTIONAL: NAD, well-developed  RESPIRATORY: Normal respiratory effort; lungs are clear to auscultation bilaterally  CARDIOVASCULAR: Regular rate and rhythm, normal S1 and S2, no murmur/rub/gallop; No lower extremity edema; Peripheral pulses are 2+ bilaterally  ABDOMEN: Nontender to palpation, normoactive bowel sounds, no rebound/guarding; No hepatosplenomegaly  MUSCLOSKELETAL: no clubbing or cyanosis of digits; no joint swelling or tenderness to palpation  PSYCH: A+O to person, place, and time; affect appropriate    LABS:                        11.9   9.87  )-----------( 388      ( 29 Aug 2020 06:11 )             34.1     08-29    132<L>  |  97<L>  |  15  ----------------------------<  155<H>  3.9   |  25  |  0.58    Ca    9.2      29 Aug 2020 06:11  Phos  3.3     08-29  Mg     1.9     08-29                  RADIOLOGY & ADDITIONAL TESTS:  Results Reviewed:   Imaging Personally Reviewed:  Electrocardiogram Personally Reviewed:    COORDINATION OF CARE:  Care Discussed with Consultants/Other Providers [Y/N]:  Prior or Outpatient Records Reviewed [Y/N]: Tyler Rooney MD, PGY1  Pager [phone]    PROGRESS NOTE:     Patient is a 84y old  Female who presents with a chief complaint of CC: hyponatremia (28 Aug 2020 13:19)      SUBJECTIVE / OVERNIGHT EVENTS:  No acute events overnight. Patient says that she feels well with no complaints.    REVIEW OF SYSTEMS:    CONSTITUTIONAL: +Mild weakness. No fatigue. No fever.  RESPIRATORY: Occaisional cough. +Mild SOB. No wheezing. No hemoptysis.   CARDIOVASCULAR: No chest pain. No palpitations.   GASTROINTESTINAL: No dysphagia. No ABD pain. No distension. No constipation. No diarrhea. No pain with defecation. No hematemesis. No hematochezia or melena.  GENITOURINARY: No dysuria. No frequency or urgency. No hesitancy. No incontinence. No urinary retention. No suprapubic pain. No hematuria.  NEUROLOGICAL: No weakness or paralysis. No lightheadedness or dizziness. No HA. No numbness or tingling.       MEDICATIONS  (STANDING):  aspirin enteric coated 81 milliGRAM(s) Oral daily  budesonide 160 MICROgram(s)/formoterol 4.5 MICROgram(s) Inhaler 2 Puff(s) Inhalation two times a day  furosemide    Tablet 20 milliGRAM(s) Oral daily  heparin   Injectable 5000 Unit(s) SubCutaneous every 8 hours  losartan 100 milliGRAM(s) Oral daily  metoprolol succinate  milliGRAM(s) Oral daily  sertraline 125 milliGRAM(s) Oral at bedtime  simethicone 80 milliGRAM(s) Chew daily  sodium chloride 2 Gram(s) Oral two times a day  sodium chloride 0.9%. 500 milliLiter(s) (100 mL/Hr) IV Continuous <Continuous>    MEDICATIONS  (PRN):  ALBUTerol    90 MICROgram(s) HFA Inhaler 2 Puff(s) Inhalation every 6 hours PRN Shortness of Breath and/or Wheezing      CAPILLARY BLOOD GLUCOSE      POCT Blood Glucose.: 222 mg/dL (28 Aug 2020 22:13)  POCT Blood Glucose.: 187 mg/dL (28 Aug 2020 12:14)    I&O's Summary    28 Aug 2020 07:01  -  29 Aug 2020 07:00  --------------------------------------------------------  IN: 550 mL / OUT: 950 mL / NET: -400 mL        PHYSICAL EXAM:  Vital Signs Last 24 Hrs  T(C): 36.6 (29 Aug 2020 06:08), Max: 36.9 (28 Aug 2020 21:15)  T(F): 97.8 (29 Aug 2020 06:08), Max: 98.5 (28 Aug 2020 21:15)  HR: 80 (29 Aug 2020 06:08) (80 - 83)  BP: 138/62 (29 Aug 2020 06:08) (126/57 - 145/56)  BP(mean): 138 (29 Aug 2020 06:08) (138 - 138)  RR: 18 (29 Aug 2020 06:08) (17 - 26)  SpO2: 98% (29 Aug 2020 06:08) (98% - 100%)    CONSTITUTIONAL: NAD, well-developed  RESPIRATORY: Normal respiratory effort; lungs are clear to auscultation bilaterally  CARDIOVASCULAR: Regular rate and rhythm, normal S1 and S2, no murmur/rub/gallop; No lower extremity edema; Peripheral pulses are 2+ bilaterally  ABDOMEN: Nontender to palpation, normoactive bowel sounds, no rebound/guarding; No hepatosplenomegaly  MUSCULOSKELETAL no clubbing or cyanosis of digits; no joint swelling or tenderness to palpation  PSYCH: A+O to person, place, and time; affect appropriate  NEURO: No focal deficits  Skin: No rash or lesions    LABS:                        11.9   9.87  )-----------( 388      ( 29 Aug 2020 06:11 )             34.1     08-29    132<L>  |  97<L>  |  15  ----------------------------<  155<H>  3.9   |  25  |  0.58    Ca    9.2      29 Aug 2020 06:11  Phos  3.3     08-29  Mg     1.9     08-29      RADIOLOGY & ADDITIONAL TESTS: No new imaging  Results Reviewed:   Imaging Personally Reviewed:  Electrocardiogram Personally Reviewed:    COORDINATION OF CARE:  Care Discussed with Consultants/Other Providers [Y/N]:  Prior or Outpatient Records Reviewed [Y/N]:

## 2020-08-29 NOTE — PROGRESS NOTE ADULT - REASON FOR ADMISSION
CC: hyponatremia

## 2020-08-29 NOTE — PROGRESS NOTE ADULT - PROBLEM SELECTOR PROBLEM 2
Bronchiectasis with acute exacerbation

## 2020-08-29 NOTE — PROGRESS NOTE ADULT - PROBLEM SELECTOR PLAN 5
DVT PPX: Improve score 1 for age - SQH  Diet: Regular diet with 1L fluid restriction  Disposition: d/c today

## 2020-08-29 NOTE — DISCHARGE NOTE NURSING/CASE MANAGEMENT/SOCIAL WORK - PATIENT PORTAL LINK FT
You can access the FollowMyHealth Patient Portal offered by [facility] by registering at the following website: [email or web address]. By joining [employer]’s FollowMyHealth portal, you will also be able to view your health information using other applications (apps) compatible with our system.

## 2020-08-29 NOTE — PROGRESS NOTE ADULT - PROBLEM SELECTOR PLAN 1
Hypo-osmolar hyponatremia. Serum osm 272, urine osm 298, Junito 92. 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  - Target <6-8 meq/24 hour correction; Na 132 this AM  - Strict I&Os  - Fluid restriction - 1L daily  - Continue to encourage  - Simethicone for gas pain  - C/w salt tabs to 2g BID  - C/w 20mg lasix PO  - Nephro consulted; appreciate recs  - TTE - EF 61%; NML LV systolic function, NML RV systolic function

## 2020-08-30 LAB
CULTURE RESULTS: SIGNIFICANT CHANGE UP
SPECIMEN SOURCE: SIGNIFICANT CHANGE UP

## 2020-11-09 NOTE — ED ADULT NURSE NOTE - NS PRO AD NO ADVANCE DIRECTIVE
Problem: Adult Inpatient Plan of Care  Goal: Plan of Care Review  Recent Flowsheet Documentation  Taken 11/9/2020 0941 by Keshav Buitrago, JEANNETTE  Progress: improving  Plan of Care Reviewed With: patient  Outcome Summary: Pt was very agreeable to therapy. Pt t/f sup to sit with CGA and stood with SBA/CGA. Pt amb with RW for 16ft x 2 trials. Pt becomes very SOA but O2 sats monitored throughout and remained above 91%. Pt performed seated LE ther ex in recliner but requires lots of rest due to SOA. Pt would cont to benefit from therapy upon DC.      No

## 2020-11-27 NOTE — PHYSICAL THERAPY INITIAL EVALUATION ADULT - ASSISTIVE DEVICE FOR TRANSFER: GAIT, REHAB EVAL
Patient : Andreas Greer Age: 27 month old Sex: male   MRN: [id number] Encounter Date: 11/26/2020    G19/G19    History     Chief Complaint   Patient presents with   â¢ Fall     HPI     11/26/2020  8:54 PM Andreas Greer is a 27 month old male who presents to the ED with his other for evaluation of a bruise to his forehead on the right side that began prior to arrival. The patient was running outside and tripped and hit a manhole cover. He looked like he was stumbling around after the fall which was concerning to the mother, though is now ambulating normally. The mother is concerned that the patient is slightly more tired than normal, but his bedtime is around this time. He has not vomited since the fall. There are no further complaints or modifying factors at this time. PCP: No Pcp      No Known Allergies    Discharge Medication List as of 11/26/2020  9:05 PM      Prior to Admission Medications    Details   ibuprofen (MOTRIN,ADVIL) 100 MG/5ML suspension Take 5.4 mLs by mouth every 6 hours as needed for Pain or Fever. Normal, Disp-50 mL, R-0      sodium chloride (OCEAN) 0.65 % nasal spray Spray 2 sprays in each nostril as needed for Congestion. Normal, Disp-30 mL, R-0      acetaminophen (TYLENOL CHILDRENS) 160 MG/5ML suspension Take 3.5 mLs by mouth every 4 hours as needed for Fever. Eprescribe, Disp-1 Bottle, R-0           History reviewed. No pertinent past medical history. History reviewed. No pertinent past surgical history. History reviewed. No pertinent family history. Social History     Tobacco Use   â¢ Smoking status: Not on file   Substance Use Topics   â¢ Alcohol use: Not on file   â¢ Drug use: Not on file       E-cigarette/Vaping     E-Cigarette/Vaping Substances & Devices       Review of Systems   Constitutional: Negative for activity change, appetite change, crying, fatigue, irritability and unexpected weight change.    HENT: Negative for congestion, drooling, ear discharge, ear pain, rhinorrhea and sore throat. Respiratory: Negative for apnea. Gastrointestinal: Negative for abdominal pain, constipation and vomiting. Genitourinary: Negative for decreased urine volume and genital sores. Musculoskeletal: Negative for gait problem. Skin: Negative for rash. Hematological:        Hematoma to right forehead. Psychiatric/Behavioral: Negative for agitation. Physical Exam     ED Triage Vitals [11/26/20 2100]   ED Triage Vitals Group      Temp 97.2 Â°F (36.2 Â°C)      Heart Rate 130      Resp (!) 20      BP       SpO2 97 %      EtCO2 mmHg       Height       Weight       Weight Scale Used       BMI (Calculated)       IBW/kg (Calculated)        Physical Exam   Constitutional: He appears well-developed and well-nourished. He is active. No distress. HENT:   Head: Hematoma (right forehead) present. Nose: No nasal discharge. Mouth/Throat: Oropharynx is clear. Eyes: Pupils are equal, round, and reactive to light. Conjunctivae and EOM are normal.   Neck: Normal range of motion. No neck adenopathy. Cardiovascular: Regular rhythm. Pulmonary/Chest: Effort normal and breath sounds normal. No nasal flaring or stridor. No respiratory distress. He has no wheezes. He has no rhonchi. He has no rales. He exhibits no retraction. Abdominal: Soft. He exhibits no distension. There is no abdominal tenderness. Musculoskeletal: Normal range of motion. Neurological: He is alert. Gait normal.   Interactive on exam.    Skin: Skin is warm and dry. He is not diaphoretic. Nursing note and vitals reviewed. ED Course     Procedures    Lab Results     No results found for this visit on 11/26/20. EKG Results     Radiology Results     Imaging Results    None         ED Medication Orders (From admission, onward)    None               MDM    Vitals  Vitals:    11/26/20 2100   Pulse: 130   Resp: (!) 20   Temp: 97.2 Â°F (36.2 Â°C)   TempSrc: Axillary   SpO2: 97%       ED Course    Initial Impression: PECARN negative.   We discussed the plan to send the patient home with his mother and for her to ice the patient's forehead, give him acetaminophen, and to observe for other symptoms. Any questions were answered and the mother is agreeable with the plan. MDM    Critical Care time spent on this patient outside of billable procedures:  None    Clinical Impression  ED Diagnosis        Final diagnosis    Recent head trauma, initial encounter                Follow Up:  [facility]  [address]  [phone]    As needed, If symptoms worsen          Summary of your Discharge Medications      You have not been prescribed any medications. Pt is discharged to home/self care in stable condition. I have reviewed the information recorded by the scribe for accuracy and agree with its contents.    ____________________________________________________________________    Elvie Lowe acting as a scribe for [facility].     [facility]  Dictation # [id number]  Scribe: Elvie Lowe    Attending Physician: Dr. Nicola Sales # [id number] Tequila Cheney PA-C  11/26/20 5425 straight cane

## 2021-02-25 NOTE — PATIENT PROFILE ADULT - BRADEN MOBILITY
Preeclampsia Risk Factors    High-Risk Factors  [] Yes    [x] No  Preeclampsia in a previous pregnancy  [] Yes    [x] No  Multifetal pregnancy  [] Yes    [x] No  Chronic hypertension (high blood pressure)  [] Yes    [x] No  Diabetes Mellitus (type 1 or type 2)  [] Yes    [x] No  Kidney disease  [] Yes    [x] No  Autoimmune disorder (lupus, rheumatoid arthritis, etc)  [] Yes    [x] No  Antiphospholipid or anticardiolipin syndrome    Moderate-Risk Factors  [x] Yes    [] No  Nulliparity  [] Yes    [x] No  Obesity (body mass index 30 kd/m2 or greater)  [] Yes    [x] No  Mother or sister had preeclampsia  [] Yes    [x] No   ancestry (based on patient self-report)  [] Yes    [x] No  Low socioeconomic status  [x] Yes    [] No  Maternal age 35 years or older  [] Yes    [x] No  Patient born with low birthweight or small-for-gestational age  [] Yes    [x] No  Previous pregnancy with small-for-gestational age or other adverse outcome  [] Yes    [x] No  Interpregnancy interval more than 10 years        (3) slightly limited

## 2021-04-08 NOTE — ED ADULT NURSE NOTE - EXTENSIONS OF SELF_ADULT
I would recommend adding doxazosin 1 mg daily  Continue other medications without change  Call me in a week or so and let me know how blood pressures are running with the addition of doxazosin 
shoes

## 2021-05-15 ENCOUNTER — INPATIENT (INPATIENT)
Facility: HOSPITAL | Age: 86
LOS: 2 days | Discharge: HOME HEALTH | End: 2021-05-18
Attending: HOSPITALIST | Admitting: HOSPITALIST
Payer: MEDICARE

## 2021-05-15 VITALS
TEMPERATURE: 97 F | HEART RATE: 73 BPM | DIASTOLIC BLOOD PRESSURE: 72 MMHG | HEIGHT: 61 IN | RESPIRATION RATE: 18 BRPM | SYSTOLIC BLOOD PRESSURE: 153 MMHG | OXYGEN SATURATION: 96 %

## 2021-05-15 DIAGNOSIS — S32.591A OTHER SPECIFIED FRACTURE OF RIGHT PUBIS, INITIAL ENCOUNTER FOR CLOSED FRACTURE: ICD-10-CM

## 2021-05-15 DIAGNOSIS — Z90.49 ACQUIRED ABSENCE OF OTHER SPECIFIED PARTS OF DIGESTIVE TRACT: Chronic | ICD-10-CM

## 2021-05-15 LAB
BASOPHILS # BLD AUTO: 0.05 K/UL — SIGNIFICANT CHANGE UP (ref 0–0.2)
BASOPHILS NFR BLD AUTO: 0.3 % — SIGNIFICANT CHANGE UP (ref 0–2)
EOSINOPHIL # BLD AUTO: 0.07 K/UL — SIGNIFICANT CHANGE UP (ref 0–0.5)
EOSINOPHIL NFR BLD AUTO: 0.4 % — SIGNIFICANT CHANGE UP (ref 0–6)
HCT VFR BLD CALC: 33.8 % — LOW (ref 34.5–45)
HGB BLD-MCNC: 11.8 G/DL — SIGNIFICANT CHANGE UP (ref 11.5–15.5)
IANC: 16.32 K/UL — HIGH (ref 1.5–8.5)
IMM GRANULOCYTES NFR BLD AUTO: 1.4 % — SIGNIFICANT CHANGE UP (ref 0–1.5)
LYMPHOCYTES # BLD AUTO: 1.29 K/UL — SIGNIFICANT CHANGE UP (ref 1–3.3)
LYMPHOCYTES # BLD AUTO: 6.8 % — LOW (ref 13–44)
MCHC RBC-ENTMCNC: 30 PG — SIGNIFICANT CHANGE UP (ref 27–34)
MCHC RBC-ENTMCNC: 34.9 GM/DL — SIGNIFICANT CHANGE UP (ref 32–36)
MCV RBC AUTO: 86 FL — SIGNIFICANT CHANGE UP (ref 80–100)
MONOCYTES # BLD AUTO: 0.91 K/UL — HIGH (ref 0–0.9)
MONOCYTES NFR BLD AUTO: 4.8 % — SIGNIFICANT CHANGE UP (ref 2–14)
NEUTROPHILS # BLD AUTO: 16.32 K/UL — HIGH (ref 1.8–7.4)
NEUTROPHILS NFR BLD AUTO: 86.3 % — HIGH (ref 43–77)
NRBC # BLD: 0 /100 WBCS — SIGNIFICANT CHANGE UP
NRBC # FLD: 0 K/UL — SIGNIFICANT CHANGE UP
PLATELET # BLD AUTO: 305 K/UL — SIGNIFICANT CHANGE UP (ref 150–400)
RBC # BLD: 3.93 M/UL — SIGNIFICANT CHANGE UP (ref 3.8–5.2)
RBC # FLD: 12.2 % — SIGNIFICANT CHANGE UP (ref 10.3–14.5)
WBC # BLD: 18.91 K/UL — HIGH (ref 3.8–10.5)
WBC # FLD AUTO: 18.91 K/UL — HIGH (ref 3.8–10.5)

## 2021-05-15 PROCEDURE — 73502 X-RAY EXAM HIP UNI 2-3 VIEWS: CPT | Mod: 26,RT

## 2021-05-15 PROCEDURE — 73552 X-RAY EXAM OF FEMUR 2/>: CPT | Mod: 26,RT

## 2021-05-15 PROCEDURE — 72125 CT NECK SPINE W/O DYE: CPT | Mod: 26

## 2021-05-15 PROCEDURE — 70450 CT HEAD/BRAIN W/O DYE: CPT | Mod: 26

## 2021-05-15 PROCEDURE — 99285 EMERGENCY DEPT VISIT HI MDM: CPT

## 2021-05-15 RX ORDER — ACETAMINOPHEN 500 MG
650 TABLET ORAL ONCE
Refills: 0 | Status: COMPLETED | OUTPATIENT
Start: 2021-05-15 | End: 2021-05-15

## 2021-05-15 RX ADMIN — Medication 650 MILLIGRAM(S): at 23:53

## 2021-05-15 NOTE — ED PROVIDER NOTE - PHYSICAL EXAMINATION
GENERAL: A&Ox3, non-toxic appearing, no acute distress  HEENT: EOMI, oral mucosa moist, normal conjunctiva, hematoma to R parietal region  RESP: CTAB, no respiratory distress, no wheezes/rhonchi/rales, speaking in full sentences  CV: RRR, no murmurs/rubs/gallops  ABDOMEN: soft, non-tender, non-distended, no guarding  MSK: no visible deformities, R lateral hip tenderness, no shortening or rotation, +log roll R side  NEURO: no focal sensory or motor deficits, CN 2-12 grossly intact  SKIN: warm, normal color, well perfused, no rash  PSYCH: normal affect

## 2021-05-15 NOTE — ED PROVIDER NOTE - CLINICAL SUMMARY MEDICAL DECISION MAKING FREE TEXT BOX
Quinton De Anda, PGY2: 85 year old female p/w mechanical fall down 2 stairs, +head strike, landed on R side. C/o R hip pain, unable to ambulate after fall. RLE w/ lateral ttp, no shortening or rotation, +log roll. C/f R hip fx. Will obtain CT head/cervical, XR hip/femur, pain control, labs for possible admission.

## 2021-05-15 NOTE — ED ADULT TRIAGE NOTE - CHIEF COMPLAINT QUOTE
pt c/o having right hip pain and head pain after a trip and fall today. pt fell down two steps. denies blood thinner use, chest pain,dizziness,n/v.  PMH HTN,depression, Bronchiectasis

## 2021-05-15 NOTE — ED PROVIDER NOTE - OBJECTIVE STATEMENT
85 year old female PMH HTN, anxiety, MDD, presents to ED for fall. Patient was ambulating down stairs around 6pm when she tripped, falling down 2 stairs and landing on her R side. She struck her head on the floor, no LOC. She does not take ASA or AC. She c/o R hip pain and hematoma to R parietal region. She ambulates w/ cane at baseline, unable to ambulate after fall. She denies HA, visual changes, syncope, cp, sob, weakness, or numbness/tingling.

## 2021-05-15 NOTE — ED PROVIDER NOTE - ATTENDING CONTRIBUTION TO CARE
DR. RECIO, ATTENDING MD-  I performed a face to face bedside interview with the patient regarding history of present illness, review of symptoms and past medical history. I completed an independent physical exam.  I have discussed the patient's plan of care with the resident.   Documentation as above in the note.    86 y/o female s/p mech fall with right hip pain ha and unable to ambulate.  Eval for ich, pelvic/hip/femur fx or dislocation.  Obtain cbc cmp covid swab ct head cxr xr pelvis hip give pain med, will need admission for further care and evaluation.

## 2021-05-15 NOTE — ED PROVIDER NOTE - PROGRESS NOTE DETAILS
Quinton De Anda, PGY2: Inferior and superior pubic rami fx on right. Ortho consulted. Will admit for PT, rehab, and pain control.

## 2021-05-16 ENCOUNTER — TRANSCRIPTION ENCOUNTER (OUTPATIENT)
Age: 86
End: 2021-05-16

## 2021-05-16 DIAGNOSIS — E87.1 HYPO-OSMOLALITY AND HYPONATREMIA: ICD-10-CM

## 2021-05-16 DIAGNOSIS — S32.591A OTHER SPECIFIED FRACTURE OF RIGHT PUBIS, INITIAL ENCOUNTER FOR CLOSED FRACTURE: ICD-10-CM

## 2021-05-16 DIAGNOSIS — I10 ESSENTIAL (PRIMARY) HYPERTENSION: ICD-10-CM

## 2021-05-16 DIAGNOSIS — J47.9 BRONCHIECTASIS, UNCOMPLICATED: ICD-10-CM

## 2021-05-16 PROBLEM — F32.9 MAJOR DEPRESSIVE DISORDER, SINGLE EPISODE, UNSPECIFIED: Chronic | Status: ACTIVE | Noted: 2020-08-24

## 2021-05-16 LAB
ALBUMIN SERPL ELPH-MCNC: 3.9 G/DL — SIGNIFICANT CHANGE UP (ref 3.3–5)
ALP SERPL-CCNC: 84 U/L — SIGNIFICANT CHANGE UP (ref 40–120)
ALT FLD-CCNC: 29 U/L — SIGNIFICANT CHANGE UP (ref 4–33)
ANION GAP SERPL CALC-SCNC: 9 MMOL/L — SIGNIFICANT CHANGE UP (ref 7–14)
ANION GAP SERPL CALC-SCNC: 9 MMOL/L — SIGNIFICANT CHANGE UP (ref 7–14)
APPEARANCE UR: CLEAR — SIGNIFICANT CHANGE UP
AST SERPL-CCNC: 32 U/L — SIGNIFICANT CHANGE UP (ref 4–32)
BILIRUB SERPL-MCNC: 0.4 MG/DL — SIGNIFICANT CHANGE UP (ref 0.2–1.2)
BILIRUB UR-MCNC: NEGATIVE — SIGNIFICANT CHANGE UP
BUN SERPL-MCNC: 14 MG/DL — SIGNIFICANT CHANGE UP (ref 7–23)
BUN SERPL-MCNC: 18 MG/DL — SIGNIFICANT CHANGE UP (ref 7–23)
CALCIUM SERPL-MCNC: 8.9 MG/DL — SIGNIFICANT CHANGE UP (ref 8.4–10.5)
CALCIUM SERPL-MCNC: 9.5 MG/DL — SIGNIFICANT CHANGE UP (ref 8.4–10.5)
CHLORIDE SERPL-SCNC: 93 MMOL/L — LOW (ref 98–107)
CHLORIDE SERPL-SCNC: 95 MMOL/L — LOW (ref 98–107)
CO2 SERPL-SCNC: 25 MMOL/L — SIGNIFICANT CHANGE UP (ref 22–31)
CO2 SERPL-SCNC: 26 MMOL/L — SIGNIFICANT CHANGE UP (ref 22–31)
COLOR SPEC: COLORLESS — SIGNIFICANT CHANGE UP
CREAT SERPL-MCNC: 0.62 MG/DL — SIGNIFICANT CHANGE UP (ref 0.5–1.3)
CREAT SERPL-MCNC: 0.66 MG/DL — SIGNIFICANT CHANGE UP (ref 0.5–1.3)
DIFF PNL FLD: NEGATIVE — SIGNIFICANT CHANGE UP
GLUCOSE SERPL-MCNC: 143 MG/DL — HIGH (ref 70–99)
GLUCOSE SERPL-MCNC: 258 MG/DL — HIGH (ref 70–99)
GLUCOSE UR QL: NEGATIVE — SIGNIFICANT CHANGE UP
KETONES UR-MCNC: NEGATIVE — SIGNIFICANT CHANGE UP
LEUKOCYTE ESTERASE UR-ACNC: NEGATIVE — SIGNIFICANT CHANGE UP
NITRITE UR-MCNC: NEGATIVE — SIGNIFICANT CHANGE UP
PH UR: 6.5 — SIGNIFICANT CHANGE UP (ref 5–8)
POTASSIUM SERPL-MCNC: 3.7 MMOL/L — SIGNIFICANT CHANGE UP (ref 3.5–5.3)
POTASSIUM SERPL-MCNC: 3.9 MMOL/L — SIGNIFICANT CHANGE UP (ref 3.5–5.3)
POTASSIUM SERPL-SCNC: 3.7 MMOL/L — SIGNIFICANT CHANGE UP (ref 3.5–5.3)
POTASSIUM SERPL-SCNC: 3.9 MMOL/L — SIGNIFICANT CHANGE UP (ref 3.5–5.3)
PROT SERPL-MCNC: 6.7 G/DL — SIGNIFICANT CHANGE UP (ref 6–8.3)
PROT UR-MCNC: NEGATIVE — SIGNIFICANT CHANGE UP
SARS-COV-2 RNA SPEC QL NAA+PROBE: SIGNIFICANT CHANGE UP
SODIUM SERPL-SCNC: 127 MMOL/L — LOW (ref 135–145)
SODIUM SERPL-SCNC: 130 MMOL/L — LOW (ref 135–145)
SP GR SPEC: 1.01 — SIGNIFICANT CHANGE UP (ref 1.01–1.02)
UROBILINOGEN FLD QL: SIGNIFICANT CHANGE UP

## 2021-05-16 PROCEDURE — 72170 X-RAY EXAM OF PELVIS: CPT | Mod: 26

## 2021-05-16 PROCEDURE — 12345: CPT | Mod: NC

## 2021-05-16 PROCEDURE — 99221 1ST HOSP IP/OBS SF/LOW 40: CPT

## 2021-05-16 PROCEDURE — 99223 1ST HOSP IP/OBS HIGH 75: CPT

## 2021-05-16 RX ORDER — SERTRALINE 25 MG/1
100 TABLET, FILM COATED ORAL DAILY
Refills: 0 | Status: DISCONTINUED | OUTPATIENT
Start: 2021-05-16 | End: 2021-05-18

## 2021-05-16 RX ORDER — ASPIRIN/CALCIUM CARB/MAGNESIUM 324 MG
1 TABLET ORAL
Qty: 0 | Refills: 0 | DISCHARGE

## 2021-05-16 RX ORDER — METOPROLOL TARTRATE 50 MG
100 TABLET ORAL DAILY
Refills: 0 | Status: DISCONTINUED | OUTPATIENT
Start: 2021-05-16 | End: 2021-05-18

## 2021-05-16 RX ORDER — ACETAMINOPHEN 500 MG
975 TABLET ORAL EVERY 8 HOURS
Refills: 0 | Status: DISCONTINUED | OUTPATIENT
Start: 2021-05-16 | End: 2021-05-18

## 2021-05-16 RX ORDER — LOSARTAN POTASSIUM 100 MG/1
100 TABLET, FILM COATED ORAL DAILY
Refills: 0 | Status: DISCONTINUED | OUTPATIENT
Start: 2021-05-16 | End: 2021-05-18

## 2021-05-16 RX ORDER — TRAZODONE HCL 50 MG
1 TABLET ORAL
Qty: 0 | Refills: 0 | DISCHARGE

## 2021-05-16 RX ORDER — LOSARTAN POTASSIUM 100 MG/1
1 TABLET, FILM COATED ORAL
Qty: 0 | Refills: 0 | DISCHARGE

## 2021-05-16 RX ORDER — SERTRALINE 25 MG/1
1 TABLET, FILM COATED ORAL
Qty: 0 | Refills: 0 | DISCHARGE

## 2021-05-16 RX ORDER — TRAZODONE HCL 50 MG
3 TABLET ORAL
Qty: 0 | Refills: 0 | DISCHARGE

## 2021-05-16 RX ORDER — METOPROLOL TARTRATE 50 MG
1 TABLET ORAL
Qty: 0 | Refills: 0 | DISCHARGE

## 2021-05-16 RX ORDER — TRAZODONE HCL 50 MG
150 TABLET ORAL AT BEDTIME
Refills: 0 | Status: DISCONTINUED | OUTPATIENT
Start: 2021-05-16 | End: 2021-05-18

## 2021-05-16 RX ORDER — ASPIRIN/CALCIUM CARB/MAGNESIUM 324 MG
81 TABLET ORAL DAILY
Refills: 0 | Status: DISCONTINUED | OUTPATIENT
Start: 2021-05-16 | End: 2021-05-18

## 2021-05-16 RX ORDER — SERTRALINE 25 MG/1
25 TABLET, FILM COATED ORAL DAILY
Refills: 0 | Status: DISCONTINUED | OUTPATIENT
Start: 2021-05-16 | End: 2021-05-18

## 2021-05-16 RX ORDER — ENOXAPARIN SODIUM 100 MG/ML
40 INJECTION SUBCUTANEOUS DAILY
Refills: 0 | Status: DISCONTINUED | OUTPATIENT
Start: 2021-05-16 | End: 2021-05-18

## 2021-05-16 RX ORDER — FLUTICASONE FUROATE, UMECLIDINIUM BROMIDE AND VILANTEROL TRIFENATATE 200; 62.5; 25 UG/1; UG/1; UG/1
1 POWDER RESPIRATORY (INHALATION)
Qty: 0 | Refills: 0 | DISCHARGE

## 2021-05-16 RX ORDER — FLUTICASONE FUROATE, UMECLIDINIUM BROMIDE AND VILANTEROL TRIFENATATE 200; 62.5; 25 UG/1; UG/1; UG/1
1 POWDER RESPIRATORY (INHALATION) DAILY
Refills: 0 | Status: DISCONTINUED | OUTPATIENT
Start: 2021-05-16 | End: 2021-05-16

## 2021-05-16 RX ORDER — OXYCODONE HYDROCHLORIDE 5 MG/1
5 TABLET ORAL EVERY 4 HOURS
Refills: 0 | Status: DISCONTINUED | OUTPATIENT
Start: 2021-05-16 | End: 2021-05-17

## 2021-05-16 RX ORDER — FLUTICASONE FUROATE AND VILANTEROL TRIFENATATE 100; 25 UG/1; UG/1
1 POWDER RESPIRATORY (INHALATION)
Qty: 0 | Refills: 0 | DISCHARGE

## 2021-05-16 RX ORDER — ACETAMINOPHEN 500 MG
650 TABLET ORAL EVERY 6 HOURS
Refills: 0 | Status: DISCONTINUED | OUTPATIENT
Start: 2021-05-16 | End: 2021-05-16

## 2021-05-16 RX ADMIN — Medication 100 MILLIGRAM(S): at 05:56

## 2021-05-16 RX ADMIN — SERTRALINE 25 MILLIGRAM(S): 25 TABLET, FILM COATED ORAL at 13:15

## 2021-05-16 RX ADMIN — ENOXAPARIN SODIUM 40 MILLIGRAM(S): 100 INJECTION SUBCUTANEOUS at 11:46

## 2021-05-16 RX ADMIN — Medication 975 MILLIGRAM(S): at 18:36

## 2021-05-16 RX ADMIN — Medication 975 MILLIGRAM(S): at 17:23

## 2021-05-16 RX ADMIN — Medication 975 MILLIGRAM(S): at 21:46

## 2021-05-16 RX ADMIN — LOSARTAN POTASSIUM 100 MILLIGRAM(S): 100 TABLET, FILM COATED ORAL at 05:56

## 2021-05-16 RX ADMIN — Medication 81 MILLIGRAM(S): at 11:45

## 2021-05-16 RX ADMIN — SERTRALINE 100 MILLIGRAM(S): 25 TABLET, FILM COATED ORAL at 13:16

## 2021-05-16 RX ADMIN — Medication 150 MILLIGRAM(S): at 21:46

## 2021-05-16 RX ADMIN — Medication 150 MILLIGRAM(S): at 02:09

## 2021-05-16 NOTE — H&P ADULT - NSHPPHYSICALEXAM_GEN_ALL_CORE
T(C): 36.1 (05-15-21 @ 21:29), Max: 36.1 (05-15-21 @ 21:29)  HR: 73 (05-15-21 @ 21:29) (73 - 73)  BP: 153/72 (05-15-21 @ 21:29) (153/72 - 153/72)  RR: 18 (05-15-21 @ 21:29) (18 - 18)  SpO2: 96% (05-15-21 @ 21:29) (96% - 96%)    Constitutional: NAD, well-developed, well-nourished  Ears, Nose, Mouth, and Throat: normal external ears and nose, normal hearing, moist oral mucosa  Eyes: normal conjunctiva, EOMI, PERRL  Neck: supple, no JVD  Respiratory: Clear to auscultation bilaterally. No wheezes, rales or rhonchi. Normal respiratory effort  Cardiovascular: RRR, no M/R/G, no edema, 2+ Peripheral Pulses  Gastrointestinal: soft, nontender, nondistended, +BS, no hernia  Skin: warm, dry, no rash, +L periorbital ecchymosis  Neurologic: sensation grossly intact, CN grossly intact, non-focal exam  Musculoskeletal: no clubbing, no cyanosis, no joint swelling, no limited ROM, TTP in lateral R hip  Psychiatric: AOX3, appropriate mood, affect

## 2021-05-16 NOTE — H&P ADULT - NSHPLABSRESULTS_GEN_ALL_CORE
05-15    127<L>  |  93<L>  |  18  ----------------------------<  143<H>  3.9   |  25  |  0.66    Ca    9.5      15 May 2021 23:31    TPro  6.7  /  Alb  3.9  /  TBili  0.4  /  DBili  x   /  AST  32  /  ALT  29  /  AlkPhos  84  05-15                        11.8   18.91 )-----------( 305      ( 15 May 2021 23:30 )             33.8     LIVER FUNCTIONS - ( 15 May 2021 23:31 )  Alb: 3.9 g/dL / Pro: 6.7 g/dL / ALK PHOS: 84 U/L / ALT: 29 U/L / AST: 32 U/L / GGT: x           Urinalysis Basic - ( 16 May 2021 00:27 )    Color: Colorless / Appearance: Clear / S.011 / pH: x  Gluc: x / Ketone: Negative  / Bili: Negative / Urobili: <2 mg/dL   Blood: x / Protein: Negative / Nitrite: Negative   Leuk Esterase: Negative / RBC: x / WBC x   Sq Epi: x / Non Sq Epi: x / Bacteria: x    EKG personally reviewed, NSR, no acute findings.

## 2021-05-16 NOTE — ED ADULT NURSE NOTE - NSFALLRSKASSISTTYPE_ED_ALL_ED
“You can access the FollowHealth Patient Portal, offered by [facility], by registering with the following website: [email or web address]” Standing/Walking

## 2021-05-16 NOTE — PHYSICAL THERAPY INITIAL EVALUATION ADULT - PERTINENT HX OF CURRENT PROBLEM, REHAB EVAL
Patient is 85 year old female admitted bronchiectasis, anxiety, HTN, hyponatremia 2/2 high water intake +/- SIADH p/w mechanical fall, found to have hyponatremia, acute minimally displaced fractures of the right superior and inferior pubic rami.

## 2021-05-16 NOTE — H&P ADULT - HISTORY OF PRESENT ILLNESS
Patient is an 84 y/o F PMH bronchiectasis, anxiety, HTN, hyponatremia 2/2 high water intake +/- SIADH p/w mechanical fall. Patient was ambulating down the stairs 5/15 ~18:00, tripped, fell down 2 stairs and landing on her R side, striking her head on the floor, no LOC.  Reports R hip pain and hematoma to R parietal region. Ambulates w/ cane at baseline, unable to ambulate after fall. Reports recent mechanical fall several weeks ago, unable to provide details. Last fall prior to that was 1 year.  Lives alone, HHD for "a few hours" a day, helps with cleaning and food preparation, otherwise independent in ADLs.

## 2021-05-16 NOTE — CONSULT NOTE ADULT - SUBJECTIVE AND OBJECTIVE BOX
Orthopedic Surgery Consult Note    HPI: 85yFemale presents with pain s/p mechanical fall onto R side today.  Patient reports R groin pain, but able to ambulate.  Patient denies head strike or LOC, denies radiation of the pain, denies numbness/tingling of the affected extremity, denies any pain/injury elsewhere.  Patient is a community ambulator without any assistive devices at baseline, lives at home with family and is independent in all ADLs/IADLs.      PMH/PSH:  PAST MEDICAL & SURGICAL HISTORY:  HTN (hypertension)    Bronchiectasis    Anxiety    Major depressive disorder, remission status unspecified, unspecified whether recurrent    S/P cholecystectomy        Vital Signs Last 24 Hrs  T(C): 36.1 (15 May 2021 21:29), Max: 36.1 (15 May 2021 21:29)  T(F): 97 (15 May 2021 21:29), Max: 97 (15 May 2021 21:29)  HR: 73 (15 May 2021 21:29) (73 - 73)  BP: 153/72 (15 May 2021 21:29) (153/72 - 153/72)  BP(mean): --  RR: 18 (15 May 2021 21:29) (18 - 18)  SpO2: 96% (15 May 2021 21:29) (96% - 96%)    Physical exam:  Gen: NAD, resting in bed.   Resp: no increased WOB on RA  Back: no gross deformity, no bony stepoff  non-TTP over bony prominences or paraspinal musculature  RLE: No gross deformity, skin intact, no overlying ecchymosis/erythema/swelling  Mildly TTP over groin, non-TTP elsewhere  Full painless passive ROM of hip/knee/ankle  5/5 strength IP/Quad/TA/GS/EHL/FHL  SILT Parekh/Sa/T/DP/SP  palpable pulses DP/PT, WWP distally  Bears weight on the extremity with mild pain    Labs:                        11.8   18.91 )-----------( 305      ( 15 May 2021 23:30 )             33.8     05-15    127<L>  |  93<L>  |  18  ----------------------------<  143<H>  3.9   |  25  |  0.66    Ca    9.5      15 May 2021 23:31    TPro  6.7  /  Alb  3.9  /  TBili  0.4  /  DBili  x   /  AST  32  /  ALT  29  /  AlkPhos  84  05-15      Imaging:    Pelvis xray demonstrating R sup/inf pubic ramus fracture      A/P: 85yFemale presenting with pubic ramus fracture s/p mechanical fall.    - FU inlet/outlet pelvis XR  - WBAT with assistive devices as needed for comfort  - encourage ambulation  - pain control  - no acute orthopedic intervention indicated at this time  - follow up with Dr. Ruiz in 1-2 weeks after discharge, call for appointment: [phone]

## 2021-05-16 NOTE — ED ADULT NURSE NOTE - OBJECTIVE STATEMENT
Patient received with c/o falls, Patient states she was walking down the stairs when she tripped and fell down 2 stairs, landed on her R side. Pt states she hit her head on the floor, no anticoagulants on board. Noted mild hematoma to the temproparietal area, endorsed 6/10 to the hip area. Pt denied LOC. Labs sent. Medicate per order.

## 2021-05-16 NOTE — DISCHARGE NOTE PROVIDER - CARE PROVIDER_API CALL
Funmilayo Ruiz)  [address], [facility] [address]  [place] [address]  Phone: [phone]  Fax: [phone]  Follow Up Time:

## 2021-05-16 NOTE — H&P ADULT - PROBLEM SELECTOR PLAN 2
check urine sodium and osm, serum osm, SH, patient euvolemic, likely SIADH and increased water intake, fluid restrict for now, f/u BMP, add salt tabs if not improving

## 2021-05-16 NOTE — DISCHARGE NOTE PROVIDER - HOSPITAL COURSE
85 F PMH bronchiectasis, anxiety, HTN, hyponatremia 2/2 high water intake +/- SIADH p/w mechanical fall, found to have hyponatremia, acute minimally displaced fractures of the right superior and inferior pubic rami. Ortho consulted, no surgical intervention needed, recommended, WBAT, pain control. PT evaluation. Pt to f/u with Orthopedics 2 weeks post discharge. 85 F PMH bronchiectasis, anxiety, HTN, hyponatremia 2/2 high water intake +/- SIADH p/w mechanical fall, found to have hyponatremia, acute minimally displaced fractures of the right superior and inferior pubic rami. Ortho consulted, no surgical intervention needed, recommended, WBAT, pain control. PT evaluation. Pt to f/u with Orthopedics 2 weeks post discharge. PT recommended rehab, family declined, wanting to take patient home.     Discussed case with Dr. Wiggins on 5/18/2021, patient medically stable for discharge to home with home PT.

## 2021-05-16 NOTE — PATIENT PROFILE ADULT - NSPROPTRIGHTSUPPORTNAME_GEN_A_NUR
Patient understands there is an increased risk of corneal edema after cataract surgery. Alex Gamez Efrain Santana

## 2021-05-16 NOTE — DISCHARGE NOTE PROVIDER - NSDCFUADDAPPT_GEN_ALL_CORE_FT
Please follow up with your primary care provider within 1 week of discharge from the hospital. If you do not have a primary care provider please follow up with the [facility], call [phone].    Follow up with Dr. Ruiz in 1-2 weeks after discharge, call for appointment: [phone]

## 2021-05-16 NOTE — H&P ADULT - ASSESSMENT
85 F PMH bronchiectasis, anxiety, HTN, hyponatremia 2/2 high water intake +/- SIADH p/w mechanical fall, found to have hyponatremia, acute minimally displaced fractures of the right superior and inferior pubic rami.

## 2021-05-16 NOTE — PROGRESS NOTE ADULT - SUBJECTIVE AND OBJECTIVE BOX
Patient is a 85y old  Female who presents with a chief complaint of fall (16 May 2021 12:52)    SUBJECTIVE / OVERNIGHT EVENTS:  Patient without pain at rest but pain exacerbated when she tries to move R leg. Patient without lightheadedness, dizziness, chest pain, SOB, n/v or abdominal pain. Patient stated she does not want to go to rehab. She stated she prefers to go home with her son. She stated his home has no stairs, but she would need a rolling walker.     MEDICATIONS  (STANDING):  acetaminophen   Tablet .. 975 milliGRAM(s) Oral every 8 hours  aspirin enteric coated 81 milliGRAM(s) Oral daily  enoxaparin Injectable 40 milliGRAM(s) SubCutaneous daily  losartan 100 milliGRAM(s) Oral daily  metoprolol succinate  milliGRAM(s) Oral daily  sertraline 100 milliGRAM(s) Oral daily  sertraline 25 milliGRAM(s) Oral daily  traZODone 150 milliGRAM(s) Oral at bedtime  Trelegy 200mcg-62.5mcg-25mcg 1 Puff(s) 1 Puff(s) Inhalation daily    MEDICATIONS  (PRN):  oxyCODONE    IR 5 milliGRAM(s) Oral every 4 hours PRN Severe Pain (7 - 10)      Vital Signs Last 24 Hrs  T(C): 36.6 (16 May 2021 21:48), Max: 36.9 (16 May 2021 05:53)  T(F): 97.9 (16 May 2021 21:48), Max: 98.4 (16 May 2021 05:53)  HR: 70 (16 May 2021 21:48) (70 - 81)  BP: 142/59 (16 May 2021 21:48) (142/59 - 161/62)  RR: 18 (16 May 2021 21:48) (18 - 18)  SpO2: 94% (16 May 2021 21:48) (94% - 100%)          PHYSICAL EXAM  GENERAL: NAD, laying in bed comfortably  CHEST/LUNG: Clear to auscultation bilaterally; No wheeze  HEART: Regular rate and rhythm; No murmurs, rubs, or gallops  ABDOMEN: Soft, Nontender, Nondistended; Bowel sounds present  EXTREMITIES:  2+ Peripheral Pulses, No clubbing, cyanosis, or edema. Limited ROM of R leg due to pain with flexion at the hip  PSYCH: AAOx3      LABS:                        11.8   18.91 )-----------( 305      ( 15 May 2021 23:30 )             33.8     05-16    130<L>  |  95<L>  |  14  ----------------------------<  258<H>  3.7   |  26  |  0.62    Ca    8.9      16 May 2021 11:32    TPro  6.7  /  Alb  3.9  /  TBili  0.4  /  DBili  x   /  AST  32  /  ALT  29  /  AlkPhos  84  05-15          Urinalysis Basic - ( 16 May 2021 00:27 )    Color: Colorless / Appearance: Clear / S.011 / pH: x  Gluc: x / Ketone: Negative  / Bili: Negative / Urobili: <2 mg/dL   Blood: x / Protein: Negative / Nitrite: Negative   Leuk Esterase: Negative / RBC: x / WBC x   Sq Epi: x / Non Sq Epi: x / Bacteria: x          Consultant(s) Notes Reviewed:    Care Discussed with Consultants/Other Providers:

## 2021-05-16 NOTE — PROGRESS NOTE ADULT - PROBLEM SELECTOR PLAN 1
Source of leukocytosis.   Fall precautions, weight bearing as tolerated  Evaluated by PT --> deemed candidate for rehab but pt prefers to go home with her son.   Pain control with standing Tylenol 975mg TID and oxycodone PRN for severe pain.  f/u CM re: home PT referral and rolling walker

## 2021-05-16 NOTE — DISCHARGE NOTE PROVIDER - NSDCMRMEDTOKEN_GEN_ALL_CORE_FT
aspirin 81 mg oral tablet: 1 tab(s) orally once a day  losartan 100 mg oral tablet: 1 tab(s) orally once a day  metoprolol succinate 100 mg oral tablet, extended release: 1 tab(s) orally once a day  sertraline 100 mg oral tablet: 1 tab(s) orally once a day (with 25 mg tablet) (total daily dose 125 mg)  sertraline 25 mg oral tablet: 1 tab(s) orally once a day (at bedtime) (with 100 mg tablet)  traZODone 50 mg oral tablet: 3 tab(s) orally once a day (at bedtime)  Trelegy Ellipta 100 mcg-62.5 mcg-25 mcg/inh inhalation powder: 1 puff(s) inhaled once a day   acetaminophen 325 mg oral tablet: 2 tab(s) orally every 6-8 hours as needed for pain   aspirin 81 mg oral tablet: 1 tab(s) orally once a day  losartan 100 mg oral tablet: 1 tab(s) orally once a day  metoprolol succinate 100 mg oral tablet, extended release: 1 tab(s) orally once a day  sertraline 100 mg oral tablet: 1 tab(s) orally once a day (with 25 mg tablet) (total daily dose 125 mg)  sertraline 25 mg oral tablet: 1 tab(s) orally once a day (at bedtime) (with 100 mg tablet)  traZODone 50 mg oral tablet: 3 tab(s) orally once a day (at bedtime)  Trelegy Ellipta 100 mcg-62.5 mcg-25 mcg/inh inhalation powder: 1 puff(s) inhaled once a day   acetaminophen 325 mg oral tablet: 2 tab(s) orally every 6-8 hours as needed for pain   aspirin 81 mg oral tablet: 1 tab(s) orally once a day  losartan 100 mg oral tablet: 1 tab(s) orally once a day  metoprolol succinate 100 mg oral tablet, extended release: 1 tab(s) orally once a day  oxyCODONE 10 mg oral tablet: 1 tab(s) orally every 6 hours, As needed, Severe Pain (7 - 10) MDD:4 tabs  HOLD FOR SEDATION/LETHARGY  DO NOT DRIVE WHILE TAKING MED    sertraline 100 mg oral tablet: 1 tab(s) orally once a day (with 25 mg tablet) (total daily dose 125 mg)  sertraline 25 mg oral tablet: 1 tab(s) orally once a day (at bedtime) (with 100 mg tablet)  traZODone 50 mg oral tablet: 3 tab(s) orally once a day (at bedtime)  Trelegy Ellipta 100 mcg-62.5 mcg-25 mcg/inh inhalation powder: 1 puff(s) inhaled once a day

## 2021-05-16 NOTE — ED ADULT NURSE NOTE - NSIMPLEMENTINTERV_GEN_ALL_ED
Implemented All Fall Risk Interventions:  Daisy to call system. Call bell, personal items and telephone within reach. Instruct patient to call for assistance. Room bathroom lighting operational. Non-slip footwear when patient is off stretcher. Physically safe environment: no spills, clutter or unnecessary equipment. Stretcher in lowest position, wheels locked, appropriate side rails in place. Provide visual cue, wrist band, yellow gown, etc. Monitor gait and stability. Monitor for mental status changes and reorient to person, place, and time. Review medications for side effects contributing to fall risk. Reinforce activity limits and safety measures with patient and family.

## 2021-05-17 LAB
ANION GAP SERPL CALC-SCNC: 9 MMOL/L — SIGNIFICANT CHANGE UP (ref 7–14)
BUN SERPL-MCNC: 19 MG/DL — SIGNIFICANT CHANGE UP (ref 7–23)
CALCIUM SERPL-MCNC: 8.8 MG/DL — SIGNIFICANT CHANGE UP (ref 8.4–10.5)
CHLORIDE SERPL-SCNC: 97 MMOL/L — LOW (ref 98–107)
CO2 SERPL-SCNC: 27 MMOL/L — SIGNIFICANT CHANGE UP (ref 22–31)
COVID-19 SPIKE DOMAIN AB INTERP: POSITIVE
COVID-19 SPIKE DOMAIN ANTIBODY RESULT: 107 U/ML — HIGH
CREAT SERPL-MCNC: 0.95 MG/DL — SIGNIFICANT CHANGE UP (ref 0.5–1.3)
CULTURE RESULTS: SIGNIFICANT CHANGE UP
GLUCOSE SERPL-MCNC: 132 MG/DL — HIGH (ref 70–99)
HCT VFR BLD CALC: 30.4 % — LOW (ref 34.5–45)
HGB BLD-MCNC: 10.2 G/DL — LOW (ref 11.5–15.5)
MCHC RBC-ENTMCNC: 29.8 PG — SIGNIFICANT CHANGE UP (ref 27–34)
MCHC RBC-ENTMCNC: 33.6 GM/DL — SIGNIFICANT CHANGE UP (ref 32–36)
MCV RBC AUTO: 88.9 FL — SIGNIFICANT CHANGE UP (ref 80–100)
NRBC # BLD: 0 /100 WBCS — SIGNIFICANT CHANGE UP
NRBC # FLD: 0 K/UL — SIGNIFICANT CHANGE UP
PLATELET # BLD AUTO: 245 K/UL — SIGNIFICANT CHANGE UP (ref 150–400)
POTASSIUM SERPL-MCNC: 4.3 MMOL/L — SIGNIFICANT CHANGE UP (ref 3.5–5.3)
POTASSIUM SERPL-SCNC: 4.3 MMOL/L — SIGNIFICANT CHANGE UP (ref 3.5–5.3)
RBC # BLD: 3.42 M/UL — LOW (ref 3.8–5.2)
RBC # FLD: 12.9 % — SIGNIFICANT CHANGE UP (ref 10.3–14.5)
SARS-COV-2 IGG+IGM SERPL QL IA: 107 U/ML — HIGH
SARS-COV-2 IGG+IGM SERPL QL IA: POSITIVE
SODIUM SERPL-SCNC: 133 MMOL/L — LOW (ref 135–145)
SPECIMEN SOURCE: SIGNIFICANT CHANGE UP
WBC # BLD: 7.48 K/UL — SIGNIFICANT CHANGE UP (ref 3.8–10.5)
WBC # FLD AUTO: 7.48 K/UL — SIGNIFICANT CHANGE UP (ref 3.8–10.5)

## 2021-05-17 PROCEDURE — 99233 SBSQ HOSP IP/OBS HIGH 50: CPT

## 2021-05-17 RX ORDER — OXYCODONE HYDROCHLORIDE 5 MG/1
10 TABLET ORAL EVERY 6 HOURS
Refills: 0 | Status: DISCONTINUED | OUTPATIENT
Start: 2021-05-17 | End: 2021-05-18

## 2021-05-17 RX ADMIN — OXYCODONE HYDROCHLORIDE 10 MILLIGRAM(S): 5 TABLET ORAL at 22:00

## 2021-05-17 RX ADMIN — Medication 975 MILLIGRAM(S): at 14:05

## 2021-05-17 RX ADMIN — OXYCODONE HYDROCHLORIDE 10 MILLIGRAM(S): 5 TABLET ORAL at 21:11

## 2021-05-17 RX ADMIN — OXYCODONE HYDROCHLORIDE 10 MILLIGRAM(S): 5 TABLET ORAL at 12:45

## 2021-05-17 RX ADMIN — Medication 81 MILLIGRAM(S): at 12:00

## 2021-05-17 RX ADMIN — Medication 100 MILLIGRAM(S): at 05:50

## 2021-05-17 RX ADMIN — SERTRALINE 25 MILLIGRAM(S): 25 TABLET, FILM COATED ORAL at 11:59

## 2021-05-17 RX ADMIN — Medication 150 MILLIGRAM(S): at 21:11

## 2021-05-17 RX ADMIN — Medication 975 MILLIGRAM(S): at 05:51

## 2021-05-17 RX ADMIN — Medication 975 MILLIGRAM(S): at 14:35

## 2021-05-17 RX ADMIN — SERTRALINE 100 MILLIGRAM(S): 25 TABLET, FILM COATED ORAL at 11:59

## 2021-05-17 RX ADMIN — ENOXAPARIN SODIUM 40 MILLIGRAM(S): 100 INJECTION SUBCUTANEOUS at 11:59

## 2021-05-17 RX ADMIN — OXYCODONE HYDROCHLORIDE 10 MILLIGRAM(S): 5 TABLET ORAL at 11:57

## 2021-05-17 RX ADMIN — LOSARTAN POTASSIUM 100 MILLIGRAM(S): 100 TABLET, FILM COATED ORAL at 05:50

## 2021-05-17 NOTE — PROGRESS NOTE ADULT - SUBJECTIVE AND OBJECTIVE BOX
Patient is a 85y old  Female who presents with a chief complaint of Pubic rami fractures s/p fall (16 May 2021 16:50)      SUBJECTIVE / OVERNIGHT EVENTS:    MEDICATIONS  (STANDING):  acetaminophen   Tablet .. 975 milliGRAM(s) Oral every 8 hours  aspirin enteric coated 81 milliGRAM(s) Oral daily  enoxaparin Injectable 40 milliGRAM(s) SubCutaneous daily  losartan 100 milliGRAM(s) Oral daily  metoprolol succinate  milliGRAM(s) Oral daily  sertraline 100 milliGRAM(s) Oral daily  sertraline 25 milliGRAM(s) Oral daily  traZODone 150 milliGRAM(s) Oral at bedtime  Trelegy 200mcg-62.5mcg-25mcg 1 Puff(s) 1 Puff(s) Inhalation daily    MEDICATIONS  (PRN):  oxyCODONE    IR 5 milliGRAM(s) Oral every 4 hours PRN Severe Pain (7 - 10)      Vital Signs Last 24 Hrs  T(C): 36.8 (17 May 2021 05:47), Max: 36.8 (17 May 2021 05:47)  T(F): 98.2 (17 May 2021 05:47), Max: 98.2 (17 May 2021 05:47)  HR: 71 (17 May 2021 05:47) (70 - 81)  BP: 136/55 (17 May 2021 05:47) (136/55 - 160/65)  BP(mean): --  RR: 18 (17 May 2021 05:47) (18 - 18)  SpO2: 94% (17 May 2021 05:47) (94% - 96%)  CAPILLARY BLOOD GLUCOSE        I&O's Summary      PHYSICAL EXAM:  GENERAL: NAD, well-developed  HEAD:  Atraumatic, Normocephalic  EYES: EOMI, PERRLA, conjunctiva and sclera clear  NECK: Supple, No JVD  CHEST/LUNG: Clear to auscultation bilaterally; No wheeze  HEART: Regular rate and rhythm; No murmurs, rubs, or gallops  ABDOMEN: Soft, Nontender, Nondistended; Bowel sounds present  EXTREMITIES:  2+ Peripheral Pulses, No clubbing, cyanosis, or edema  PSYCH: AAOx3  NEUROLOGY: non-focal  SKIN: No rashes or lesions    LABS:                        10.2   7.48  )-----------( 245      ( 17 May 2021 06:43 )             30.4     05-17    133<L>  |  97<L>  |  19  ----------------------------<  132<H>  4.3   |  27  |  0.95    Ca    8.8      17 May 2021 06:43    TPro  6.7  /  Alb  3.9  /  TBili  0.4  /  DBili  x   /  AST  32  /  ALT  29  /  AlkPhos  84  05-15          Urinalysis Basic - ( 16 May 2021 00:27 )    Color: Colorless / Appearance: Clear / S.011 / pH: x  Gluc: x / Ketone: Negative  / Bili: Negative / Urobili: <2 mg/dL   Blood: x / Protein: Negative / Nitrite: Negative   Leuk Esterase: Negative / RBC: x / WBC x   Sq Epi: x / Non Sq Epi: x / Bacteria: x        RADIOLOGY & ADDITIONAL TESTS:    Imaging Personally Reviewed:    Consultant(s) Notes Reviewed:      Care Discussed with Consultants/Other Providers:   Patient is a 85y old  Female who presents with a chief complaint of Pubic rami fractures s/p fall (16 May 2021 16:50)      SUBJECTIVE / OVERNIGHT EVENTS:  Patient says pain meds only control pain for 1 hour. Denies cp, SOB, abdominal pain, N/V/D     MEDICATIONS  (STANDING):  acetaminophen   Tablet .. 975 milliGRAM(s) Oral every 8 hours  aspirin enteric coated 81 milliGRAM(s) Oral daily  enoxaparin Injectable 40 milliGRAM(s) SubCutaneous daily  losartan 100 milliGRAM(s) Oral daily  metoprolol succinate  milliGRAM(s) Oral daily  sertraline 100 milliGRAM(s) Oral daily  sertraline 25 milliGRAM(s) Oral daily  traZODone 150 milliGRAM(s) Oral at bedtime  Trelegy 200mcg-62.5mcg-25mcg 1 Puff(s) 1 Puff(s) Inhalation daily    MEDICATIONS  (PRN):  oxyCODONE    IR 5 milliGRAM(s) Oral every 4 hours PRN Severe Pain (7 - 10)      Vital Signs Last 24 Hrs  T(C): 36.8 (17 May 2021 05:47), Max: 36.8 (17 May 2021 05:47)  T(F): 98.2 (17 May 2021 05:47), Max: 98.2 (17 May 2021 05:47)  HR: 71 (17 May 2021 05:47) (70 - 81)  BP: 136/55 (17 May 2021 05:47) (136/55 - 160/65)  BP(mean): --  RR: 18 (17 May 2021 05:47) (18 - 18)  SpO2: 94% (17 May 2021 05:47) (94% - 96%)  CAPILLARY BLOOD GLUCOSE        I&O's Summary      PHYSICAL EXAM:  GENERAL: NAD, well-developed  HEAD:  Atraumatic, Normocephalic  EYES: EOMI, PERRLA, conjunctiva and sclera clear  NECK: Supple, No JVD  CHEST/LUNG: Clear to auscultation bilaterally; No wheeze  HEART: Regular rate and rhythm; No murmurs, rubs, or gallops  ABDOMEN: Soft, Nontender, Nondistended; Bowel sounds present  EXTREMITIES:  2+ Peripheral Pulses, No clubbing, cyanosis, or edema  PSYCH: AAOx3  NEUROLOGY: non-focal  SKIN: No rashes or lesions    LABS:                        10.2   7.48  )-----------( 245      ( 17 May 2021 06:43 )             30.4     05-17    133<L>  |  97<L>  |  19  ----------------------------<  132<H>  4.3   |  27  |  0.95    Ca    8.8      17 May 2021 06:43    TPro  6.7  /  Alb  3.9  /  TBili  0.4  /  DBili  x   /  AST  32  /  ALT  29  /  AlkPhos  84  05-15          Urinalysis Basic - ( 16 May 2021 00:27 )    Color: Colorless / Appearance: Clear / S.011 / pH: x  Gluc: x / Ketone: Negative  / Bili: Negative / Urobili: <2 mg/dL   Blood: x / Protein: Negative / Nitrite: Negative   Leuk Esterase: Negative / RBC: x / WBC x   Sq Epi: x / Non Sq Epi: x / Bacteria: x        RADIOLOGY & ADDITIONAL TESTS:    Imaging Personally Reviewed:    Consultant(s) Notes Reviewed:      Care Discussed with Consultants/Other Providers:

## 2021-05-17 NOTE — PROGRESS NOTE ADULT - PROBLEM SELECTOR PLAN 1
Source of leukocytosis.   Fall precautions, weight bearing as tolerated  Evaluated by PT --> deemed candidate for rehab but pt prefers to go home with her son.   Pain control with standing Tylenol 975mg TID and oxycodone PRN for severe pain.  f/u CM re: home PT referral and rolling walker Source of leukocytosis.   Fall precautions, weight bearing as tolerated  Evaluated by PT --> deemed candidate for rehab but pt prefers to go home with her son.   Pain still not well controlled   c/w Tylenol prn  increased oxycodone to 10mg PO q 6hr prn for severe pain.  f/u CM re: home PT referral and rolling walker

## 2021-05-18 ENCOUNTER — TRANSCRIPTION ENCOUNTER (OUTPATIENT)
Age: 86
End: 2021-05-18

## 2021-05-18 VITALS
SYSTOLIC BLOOD PRESSURE: 140 MMHG | TEMPERATURE: 99 F | HEART RATE: 78 BPM | DIASTOLIC BLOOD PRESSURE: 57 MMHG | OXYGEN SATURATION: 95 % | RESPIRATION RATE: 18 BRPM

## 2021-05-18 LAB
ANION GAP SERPL CALC-SCNC: 9 MMOL/L — SIGNIFICANT CHANGE UP (ref 7–14)
BUN SERPL-MCNC: 19 MG/DL — SIGNIFICANT CHANGE UP (ref 7–23)
CALCIUM SERPL-MCNC: 9 MG/DL — SIGNIFICANT CHANGE UP (ref 8.4–10.5)
CHLORIDE SERPL-SCNC: 97 MMOL/L — LOW (ref 98–107)
CO2 SERPL-SCNC: 26 MMOL/L — SIGNIFICANT CHANGE UP (ref 22–31)
CREAT SERPL-MCNC: 0.83 MG/DL — SIGNIFICANT CHANGE UP (ref 0.5–1.3)
GLUCOSE SERPL-MCNC: 126 MG/DL — HIGH (ref 70–99)
HCT VFR BLD CALC: 29.6 % — LOW (ref 34.5–45)
HGB BLD-MCNC: 10 G/DL — LOW (ref 11.5–15.5)
MAGNESIUM SERPL-MCNC: 1.8 MG/DL — SIGNIFICANT CHANGE UP (ref 1.6–2.6)
MCHC RBC-ENTMCNC: 30.4 PG — SIGNIFICANT CHANGE UP (ref 27–34)
MCHC RBC-ENTMCNC: 33.8 GM/DL — SIGNIFICANT CHANGE UP (ref 32–36)
MCV RBC AUTO: 90 FL — SIGNIFICANT CHANGE UP (ref 80–100)
NRBC # BLD: 0 /100 WBCS — SIGNIFICANT CHANGE UP
NRBC # FLD: 0 K/UL — SIGNIFICANT CHANGE UP
PHOSPHATE SERPL-MCNC: 3.3 MG/DL — SIGNIFICANT CHANGE UP (ref 2.5–4.5)
PLATELET # BLD AUTO: 260 K/UL — SIGNIFICANT CHANGE UP (ref 150–400)
POTASSIUM SERPL-MCNC: 4.3 MMOL/L — SIGNIFICANT CHANGE UP (ref 3.5–5.3)
POTASSIUM SERPL-SCNC: 4.3 MMOL/L — SIGNIFICANT CHANGE UP (ref 3.5–5.3)
RBC # BLD: 3.29 M/UL — LOW (ref 3.8–5.2)
RBC # FLD: 12.8 % — SIGNIFICANT CHANGE UP (ref 10.3–14.5)
SODIUM SERPL-SCNC: 132 MMOL/L — LOW (ref 135–145)
WBC # BLD: 6.91 K/UL — SIGNIFICANT CHANGE UP (ref 3.8–10.5)
WBC # FLD AUTO: 6.91 K/UL — SIGNIFICANT CHANGE UP (ref 3.8–10.5)

## 2021-05-18 PROCEDURE — 99239 HOSP IP/OBS DSCHRG MGMT >30: CPT

## 2021-05-18 RX ORDER — ACETAMINOPHEN 500 MG
2 TABLET ORAL
Qty: 0 | Refills: 0 | DISCHARGE
Start: 2021-05-18

## 2021-05-18 RX ORDER — ACETAMINOPHEN 500 MG
3 TABLET ORAL
Qty: 0 | Refills: 0 | DISCHARGE
Start: 2021-05-18

## 2021-05-18 RX ORDER — OXYCODONE HYDROCHLORIDE 5 MG/1
1 TABLET ORAL
Qty: 20 | Refills: 0
Start: 2021-05-18 | End: 2021-05-22

## 2021-05-18 RX ADMIN — Medication 81 MILLIGRAM(S): at 12:28

## 2021-05-18 RX ADMIN — Medication 100 MILLIGRAM(S): at 05:11

## 2021-05-18 RX ADMIN — SERTRALINE 25 MILLIGRAM(S): 25 TABLET, FILM COATED ORAL at 12:29

## 2021-05-18 RX ADMIN — OXYCODONE HYDROCHLORIDE 10 MILLIGRAM(S): 5 TABLET ORAL at 05:11

## 2021-05-18 RX ADMIN — LOSARTAN POTASSIUM 100 MILLIGRAM(S): 100 TABLET, FILM COATED ORAL at 05:11

## 2021-05-18 RX ADMIN — ENOXAPARIN SODIUM 40 MILLIGRAM(S): 100 INJECTION SUBCUTANEOUS at 12:29

## 2021-05-18 RX ADMIN — OXYCODONE HYDROCHLORIDE 10 MILLIGRAM(S): 5 TABLET ORAL at 12:29

## 2021-05-18 RX ADMIN — OXYCODONE HYDROCHLORIDE 10 MILLIGRAM(S): 5 TABLET ORAL at 06:00

## 2021-05-18 RX ADMIN — SERTRALINE 100 MILLIGRAM(S): 25 TABLET, FILM COATED ORAL at 12:29

## 2021-05-18 NOTE — PROGRESS NOTE ADULT - PROBLEM SELECTOR PLAN 4
c/w home Trelegy, asymptomatic at this time.      DISPO: Anticipate d/c home today. Spoke to son Leonel and aware.
c/w home Trelegy, asymptomatic at this time.      DISPO: Anticipate d/c home when pain controlled.
c/w home Trelegy, asymptomatic at this time.      DISPO: Anticipate d/c home in the next 24-48 hours if pain adequately controlled.

## 2021-05-18 NOTE — DISCHARGE NOTE NURSING/CASE MANAGEMENT/SOCIAL WORK - NSDCDMETYPESERV_GEN_ALL_CORE_FT
Rolling Walker deliver to bedside, [employer], shower chair is approved, and will be deliver to patient home  Wheelchair not approve yet will call patient with approval.

## 2021-05-18 NOTE — DISCHARGE NOTE NURSING/CASE MANAGEMENT/SOCIAL WORK - NSDCFUADDAPPT_GEN_ALL_CORE_FT
Please follow up with your primary care provider within 1 week of discharge from the hospital. If you do not have a primary care provider please follow up with the [facility], call [phone].    Follow up with Dr. Ruiz in 1-2 weeks after discharge, call for appointment: [phone]

## 2021-05-18 NOTE — PROGRESS NOTE ADULT - ASSESSMENT
85 y.o. Female w/ hx bronchiectasis, anxiety, HTN, hyponatremia 2/2 high water intake +/- SIADH p/w mechanical fall, found to have hyponatremia, acute minimally displaced fractures of the right superior and inferior pubic rami. Patient for home with home PT. D/C 40 minutes. 
85 F PMH bronchiectasis, anxiety, HTN, hyponatremia 2/2 high water intake +/- SIADH p/w mechanical fall, found to have hyponatremia, acute minimally displaced fractures of the right superior and inferior pubic rami.
85 y.o. Female w/ hx bronchiectasis, anxiety, HTN, hyponatremia 2/2 high water intake +/- SIADH p/w mechanical fall, found to have hyponatremia, acute minimally displaced fractures of the right superior and inferior pubic rami.

## 2021-05-18 NOTE — PROGRESS NOTE ADULT - PROBLEM SELECTOR PROBLEM 4
Bronchiectasis without complication

## 2021-05-18 NOTE — PROGRESS NOTE ADULT - PROBLEM SELECTOR PLAN 1
Source of leukocytosis.   Fall precautions, weight bearing as tolerated  Evaluated by PT --> deemed candidate for rehab but pt prefers to go home with her son.   Pain well controlled on oxycodone 10mg PO q 6hr prn for severe pain.  f/u CM re: home PT referral and rolling walker

## 2021-05-18 NOTE — PROGRESS NOTE ADULT - PROBLEM SELECTOR PROBLEM 1
Closed fracture of ramus of right pubis, initial encounter

## 2021-05-18 NOTE — DISCHARGE NOTE NURSING/CASE MANAGEMENT/SOCIAL WORK - NSSCTYPOFSERV_GEN_ALL_CORE
Nurse to call and visit day after discharge , follow by physical therapy, Occupational therapy, Social work for long term planning, evaluate for sort term Aide

## 2021-05-18 NOTE — PROGRESS NOTE ADULT - PROBLEM SELECTOR PLAN 2
Patient euvolemic, possibly SIADH due to pain,   fluid restrict for now  monitor sodium levels

## 2021-05-18 NOTE — DISCHARGE NOTE NURSING/CASE MANAGEMENT/SOCIAL WORK - PATIENT PORTAL LINK FT
You can access the FollowMyHealth Patient Portal offered by [facility] by registering at the following website: [email or web address]. By joining [facility]’s FollowMyHealth portal, you will also be able to view your health information using other applications (apps) compatible with our system.

## 2021-05-18 NOTE — PROGRESS NOTE ADULT - SUBJECTIVE AND OBJECTIVE BOX
Patient is a 85y old  Female who presents with a chief complaint of fall (17 May 2021 09:54)      SUBJECTIVE / OVERNIGHT EVENTS:  Patient says pain is better controlled now with new dose of Oxycodone. Denies cp, SOB, abdominal pain, N/V/D     MEDICATIONS  (STANDING):  acetaminophen   Tablet .. 975 milliGRAM(s) Oral every 8 hours  aspirin enteric coated 81 milliGRAM(s) Oral daily  enoxaparin Injectable 40 milliGRAM(s) SubCutaneous daily  losartan 100 milliGRAM(s) Oral daily  metoprolol succinate  milliGRAM(s) Oral daily  sertraline 100 milliGRAM(s) Oral daily  sertraline 25 milliGRAM(s) Oral daily  traZODone 150 milliGRAM(s) Oral at bedtime  Trelegy 200mcg-62.5mcg-25mcg 1 Puff(s) 1 Puff(s) Inhalation daily    MEDICATIONS  (PRN):  oxyCODONE    IR 10 milliGRAM(s) Oral every 6 hours PRN Severe Pain (7 - 10)      Vital Signs Last 24 Hrs  T(C): 36.9 (18 May 2021 05:09), Max: 36.9 (18 May 2021 05:09)  T(F): 98.4 (18 May 2021 05:09), Max: 98.4 (18 May 2021 05:09)  HR: 78 (18 May 2021 05:09) (72 - 78)  BP: 130/56 (18 May 2021 05:09) (130/56 - 137/50)  BP(mean): --  RR: 18 (18 May 2021 05:09) (18 - 18)  SpO2: 94% (18 May 2021 05:09) (94% - 94%)  CAPILLARY BLOOD GLUCOSE        I&O's Summary      PHYSICAL EXAM:  GENERAL: NAD, well-developed  HEAD:  Atraumatic, Normocephalic  EYES: EOMI, PERRLA, conjunctiva and sclera clear  NECK: Supple, No JVD  CHEST/LUNG: Clear to auscultation bilaterally; No wheeze  HEART: Regular rate and rhythm; No murmurs, rubs, or gallops  ABDOMEN: Soft, Nontender, Nondistended; Bowel sounds present  EXTREMITIES:  2+ Peripheral Pulses, No clubbing, cyanosis, or edema  PSYCH: AAOx3  NEUROLOGY: non-focal  SKIN: No rashes or lesions    LABS:                        10.0   6.91  )-----------( 260      ( 18 May 2021 06:50 )             29.6     05-18    132<L>  |  97<L>  |  19  ----------------------------<  126<H>  4.3   |  26  |  0.83    Ca    9.0      18 May 2021 06:50  Phos  3.3     05-18  Mg     1.8     05-18                RADIOLOGY & ADDITIONAL TESTS:    Imaging Personally Reviewed:    Consultant(s) Notes Reviewed:      Care Discussed with Consultants/Other Providers:

## 2021-05-20 NOTE — ED CDU PROVIDER INITIAL DAY NOTE - LEFT FACE
Waiting to interview pt.   + superficial abrasion noted to left temporal region , no facial tenderness with palpation

## 2021-06-01 PROBLEM — Z00.00 ENCOUNTER FOR PREVENTIVE HEALTH EXAMINATION: Status: ACTIVE | Noted: 2021-06-01

## 2021-06-02 ENCOUNTER — APPOINTMENT (OUTPATIENT)
Dept: ORTHOPEDIC SURGERY | Facility: CLINIC | Age: 86
End: 2021-06-02
Payer: MEDICARE

## 2021-06-02 VITALS — BODY MASS INDEX: 27.6 KG/M2 | HEIGHT: 62 IN | WEIGHT: 150 LBS

## 2021-06-02 DIAGNOSIS — S32.591D OTHER SPECIFIED FRACTURE OF RIGHT PUBIS, SUBSEQUENT ENCOUNTER FOR FRACTURE WITH ROUTINE HEALING: ICD-10-CM

## 2021-06-02 PROCEDURE — 99072 ADDL SUPL MATRL&STAF TM PHE: CPT

## 2021-06-02 PROCEDURE — 72170 X-RAY EXAM OF PELVIS: CPT

## 2021-06-02 PROCEDURE — 99213 OFFICE O/P EST LOW 20 MIN: CPT

## 2021-06-03 NOTE — DISCHARGE NOTE PROVIDER - NSDCQMCOGNITION_NEU_ALL_CORE
Patient: Tamy A Pierce  Colostomy Formation, PROCTOSCOPY WITH BIOPSY  Anesthesia type: No value filed.    Patient location: ICU  Last vitals:   Vitals Value Taken Time   /74 11/20/2019  7:00 PM   Temp 36.4  C (97.6  F) 11/20/2019  6:00 PM   Pulse 58 11/20/2019  7:02 PM   Resp 16 11/20/2019  7:02 PM   SpO2 100 % 11/20/2019  7:02 PM   Vitals shown include unvalidated device data.  Post vital signs: stable  Level of consciousness: awake and responds to stimulation  Post-anesthesia pain: pain unable to be assessed as patient is intubated and sedated  Post-anesthesia nausea and vomiting: nausea unable to be assessed as patient is intubated and sedated  Pulmonary: ETT  Cardiovascular: stable and blood pressure at baseline  Hydration: adequate  Anesthetic events: yes: unexpected ICU admission secondary to intraoperative cardiac arrest    QCDR Measures:  ASA# 11 - [place]-op Cardiac Arrest: ASA11A - Patient experienced unanticipated cardiac arrest  ASA# 12 - Beverly-op Mortality Rate: ASA12B - Patient did NOT die  ASA# 13 - PACU Re-Intubation Rate: direct transfer to ICU, patient intubated  ASA# 10 - Composite Anes Safety: ASA10B - Serious adverse event.  See anesthesia events within the post-eval note.    Additional Notes:  Patient had unanticipated cardiac arrest presumed vagally mediated asystole. Achieved ROSC following 30 seconds - 1 minute of chest compressions. Remains intubated and sedated on direct transport to the ICU. Patient is otherwise hemodynamically stable, sinus rhythm in the 60s, -140s/70-80s, without any vasopressor or inotropic support. Labs pending. Sign-out given to ICU intensivist Dr. Ch. Dr. Moreno and myself met with family and debriefed on intra-operative events.     No difficulties

## 2021-07-16 ENCOUNTER — APPOINTMENT (OUTPATIENT)
Dept: ORTHOPEDIC SURGERY | Facility: CLINIC | Age: 86
End: 2021-07-16

## 2021-10-19 NOTE — ED ADULT NURSE NOTE - NS ED NURSE REPORT GIVEN DT
Speech Therapy      Visit Type: Evaluation and Treatment  -  Communication/cognition and swallow  Reason for consult:  consult for swallow and cognitive-communicative evaluation and treatment following hospitalization for acute neurological conditions.      Precautions     - Medical precautions:  swallowing precautions; standard precautions.      - Oxygen: room air (O2 saturations documented at 98%).      - Lines in chart and on patient reviewed; cautions maintained through out session    - Safety measures: bed rails and bed alarm    - Cognition:         • Expression is verbal.          • Following commands intact.      - Affect/Behavior: alert, calm, cooperative and pleasant    Current Diet: thin liquids and mechanical soft      - Dentition: intact    - Feeding: does not feed self    SUBJECTIVE  Pt's wife present.  OK to see pt, per RN (Samantha).Patient agreed to participate in therapy this date.  Patient verbally agrees to allow the following to be present during the session:  Spouse   Patient/family goals: maximize function   Pain at onset of session:     -  0/10     OBJECTIVE      Communication/Cognition:  Naming:      - Confrontation: profound impairment (0-24% accuracy)    - Effective techniques: repetition, slower rate, sentence completion, semantic cues and phonemic cues    - VCN 0/2:  Max cues required.   Auditory Comprehension:      - 1-step simple commands 100% w/ extra time due to suspected limb apraxia.     Basic yes/no questions 80% w/ min cues.     Swallowing   No temperature spike noted.  Patient positioning: upright in bed  Pretrial vocal quality: normal    Consistencies:  Thin Liquid (straw):     - Oral: impaired, reduced labial closure   - Pharyngeal: impaired, suspect decreased hyolaryngeal elevation  Dysphagia 1/Puree:     - Oral: impaired, reduced labial closure   - Pharyngeal: impaired, suspect decreased hyolaryngeal elevation  Mechanical Soft:    - Oral: impaired, slow oral transit and  reduced labial closure   - Pharyngeal: impaired, suspect decreased hyolaryngeal elevation    Esophageal symptoms: not present. No pain associated with swallow.  No overt signs of aspiration and adequate oral clearance with all consistencies.           Test and Outcome Measures  Bedside Western Aphasia Battery  The Beside WAB is a screener that is designed to evaluate a patient's language function following stroke or traumatic brain injury.  I. Spontaneous Speech      a. Information Content 2/10  II. Auditory Verbal Comprehension      a. Yes/No Questions 5/10  III.         Sequential Commands        0/10  IV. Repetition                         3/10  V. Naming                                  0/10   Bedside Aphasia Score 28.3/100 which is indicative of severe Wernicke's aphasia    Per bedside aphasia classification, patient is currently presenting with Wernicke's aphasia c/b reduced comprehension of egocentric information and single step directives, fluent neologistic jargon with semblance to appropriate syntax.      ASSESSMENT                                                                        • Impairments: swallowing, auditory comprehension and verbal expression  • Functional Limitations: eating/drinking, expression of ideas/needs and comprehension  • Skilled therapy is required to address these limitations in attempt to maximize the patient's independence. and is required to establish safe means of nutrition, hydration and medication administration  • Pt is presenting with mild oral and suspected pharyngeal dysphagia as well as moderate Wernicke's type aphasia s/p admission with acute bilateral CVAs. Receptive language is improving.  Despite the severity of these deficits, SLP suspects that prognosis for ongoing improvement is very good.  Wife provided initial basic education regarding aphasia.   • Rehab Potential: good  • Potential barriers to progress:  Current medical conditions and severity of deficits  •  Progress: Slow progress, cognitive deficits    Education Provided:   Learning assessment:  - Primary learner: patient and patient's significant other  - Are they ready to learn: yes  - Preferred learning style: verbal, written and demonstration  - Barriers to learning: cognitive  Education provided during session:  - Receiving education: patient's significant other  - aphasia and dysphagia  - Results of above outlined education: Verbalizes understanding and Demonstrates understanding  Patient at end of session:    - location: in bed    - safety measures: bed rails x4, call light within reach and equipment intact    - hand off to: nurse and family/caregiver    PLAN    Interventions:  Assess tolerance of least restrictive oral diet, communication/cognition therapy and patient/family education    Plan/Goal Agreement:  Family/significant other/caregiver agrees and patient agrees with goals and treatment plan    RECOMMENDATIONS     -Diet:          *thin liquids and mechanical soft    -Medication Administration:         *with puree and crushed    -Feeding Guidelines:          *eat slowly only when alert, sit fully upright for all po intake, small bites/sips, feed patient/total feeding assistance and allow extra time to swallow    -Speech Reviewed Swallow:         *with patient/family and with clinical caregivers    -Communication Cognition:          *Patient demonstrates acute communication and cognition deficits, will initiate speech therapy.  Patient will require 24/7 supervision at discharge.  Patient would benefit from intensive rehab program.      -Consult:         *physical medicine and rehab    GOALS    Long Term Goals:   1. Pt to tolerate least restrictive PO diet without overt s/s aspiration to facilitate safety with PO intake. Progresssing.   2. Pt will answer egocentric yes/no questions with 75% accy with mod cues to improve basic comprehension. - PROGRESSING  3. Pt will follow 1 step directives with 75% acccy  with mod cues to improve ability to complete structured therapy tasks. - GOAL MET    Documented in the chart in the following areas: Assessment.      Therapy procedure time and total treatment time can be found documented on the Time Entry flowsheet   28-Jul-2019 00:40

## 2021-11-08 NOTE — DISCHARGE NOTE PROVIDER - NSDCQMPCI_CARD_ALL_CORE
47 y/o female with PMH HTN, Multiple sclerosis, recent spinal surgery 10/8 ([facility]) presented to [place] ED [facility] after being found unconscious at home, unknown period of time. 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 ENT consulted due to tongue swelling. pt has been off sedated due to neuro checks. per nursing, pt has been biting down on tongue. nursing has not been able to place bite block. Denies any hypotensive episodes.     ICU Vital Signs Last 24 Hrs  T(C): 37.1 (08 Nov 2021 14:40), Max: 37.1 (08 Nov 2021 14:40)  T(F): 98.8 (08 Nov 2021 14:40), Max: 98.8 (08 Nov 2021 14:40)  HR: 89 (08 Nov 2021 16:00) (61 - 95)  BP: 141/74 (08 Nov 2021 11:00) (116/65 - 173/89)  BP(mean): 102 (08 Nov 2021 11:00) (87 - 124)  ABP: 170/91 (08 Nov 2021 16:00) (100/87 - 183/100)  ABP(mean): 122 (08 Nov 2021 16:00) (90 - 133)  RR: 14 (08 Nov 2021 16:00) (14 - 28)  SpO2: 97% (08 Nov 2021 16:00) (94% - 100%)    PHYSICAL EXAM:    CONSTITUTIONAL: Well nourished, well developed, NON-DYSMORPHIC, and in no acute distress.    HEAD: normocephalic, atraumatic.  EARS: The right/left pinna was normal.    ORAL CAVITY/OROPHARYNX: significant tongue swelling, most significant on ventral surface. mouth unable to be opened due to tonicity of jaw. tongue indurated. OP limited due to ett  NECK: No cervical lymphadenopathy  RESPIRATORY: Respirations unlabored, no increased work of breathing with use of accessory muscles and retractions. No stridor.  CARDIAC: Warm extremities, no cyanosis.     A/P: 46 F w/ w/ significant tongue swelling, likely secondary to biting down on tongue. bite block unable to be placed due to tone of jaw muscles.   -pt will need trach for definitive management  -consider sedation for bite block placement  -recommend aggressive oral care    seen with chief resident. discussed with attending No

## 2021-11-18 ENCOUNTER — EMERGENCY (EMERGENCY)
Facility: HOSPITAL | Age: 86
LOS: 1 days | Discharge: HOME | End: 2021-11-18
Attending: STUDENT IN AN ORGANIZED HEALTH CARE EDUCATION/TRAINING PROGRAM | Admitting: STUDENT IN AN ORGANIZED HEALTH CARE EDUCATION/TRAINING PROGRAM
Payer: MEDICARE

## 2021-11-18 VITALS
OXYGEN SATURATION: 97 % | SYSTOLIC BLOOD PRESSURE: 213 MMHG | DIASTOLIC BLOOD PRESSURE: 149 MMHG | HEIGHT: 61 IN | HEART RATE: 74 BPM | TEMPERATURE: 99 F | RESPIRATION RATE: 16 BRPM

## 2021-11-18 VITALS
TEMPERATURE: 98 F | SYSTOLIC BLOOD PRESSURE: 199 MMHG | HEART RATE: 71 BPM | DIASTOLIC BLOOD PRESSURE: 82 MMHG | RESPIRATION RATE: 17 BRPM | OXYGEN SATURATION: 100 %

## 2021-11-18 DIAGNOSIS — Z90.49 ACQUIRED ABSENCE OF OTHER SPECIFIED PARTS OF DIGESTIVE TRACT: Chronic | ICD-10-CM

## 2021-11-18 LAB
ALBUMIN SERPL ELPH-MCNC: 4.1 G/DL — SIGNIFICANT CHANGE UP (ref 3.3–5)
ALP SERPL-CCNC: 95 U/L — SIGNIFICANT CHANGE UP (ref 40–120)
ALT FLD-CCNC: 26 U/L — SIGNIFICANT CHANGE UP (ref 4–33)
ANION GAP SERPL CALC-SCNC: 13 MMOL/L — SIGNIFICANT CHANGE UP (ref 7–14)
APTT BLD: 28.3 SEC — SIGNIFICANT CHANGE UP (ref 27–36.3)
AST SERPL-CCNC: 43 U/L — HIGH (ref 4–32)
BASE EXCESS BLDV CALC-SCNC: 2.4 MMOL/L — SIGNIFICANT CHANGE UP (ref -2–3)
BASOPHILS # BLD AUTO: 0.04 K/UL — SIGNIFICANT CHANGE UP (ref 0–0.2)
BASOPHILS NFR BLD AUTO: 0.5 % — SIGNIFICANT CHANGE UP (ref 0–2)
BILIRUB SERPL-MCNC: 0.4 MG/DL — SIGNIFICANT CHANGE UP (ref 0.2–1.2)
BLOOD GAS VENOUS COMPREHENSIVE RESULT: SIGNIFICANT CHANGE UP
BUN SERPL-MCNC: 16 MG/DL — SIGNIFICANT CHANGE UP (ref 7–23)
CALCIUM SERPL-MCNC: 9.3 MG/DL — SIGNIFICANT CHANGE UP (ref 8.4–10.5)
CHLORIDE BLDV-SCNC: 101 MMOL/L — SIGNIFICANT CHANGE UP (ref 96–108)
CHLORIDE SERPL-SCNC: 98 MMOL/L — SIGNIFICANT CHANGE UP (ref 98–107)
CO2 BLDV-SCNC: 29.2 MMOL/L — HIGH (ref 22–26)
CO2 SERPL-SCNC: 25 MMOL/L — SIGNIFICANT CHANGE UP (ref 22–31)
CREAT SERPL-MCNC: 0.72 MG/DL — SIGNIFICANT CHANGE UP (ref 0.5–1.3)
EOSINOPHIL # BLD AUTO: 0.17 K/UL — SIGNIFICANT CHANGE UP (ref 0–0.5)
EOSINOPHIL NFR BLD AUTO: 2.1 % — SIGNIFICANT CHANGE UP (ref 0–6)
GAS PNL BLDV: 132 MMOL/L — LOW (ref 136–145)
GLUCOSE BLDV-MCNC: 126 MG/DL — HIGH (ref 70–99)
GLUCOSE SERPL-MCNC: 117 MG/DL — HIGH (ref 70–99)
HCO3 BLDV-SCNC: 28 MMOL/L — SIGNIFICANT CHANGE UP (ref 22–29)
HCT VFR BLD CALC: 33.5 % — LOW (ref 34.5–45)
HCT VFR BLDA CALC: 32 % — LOW (ref 34.5–46.5)
HGB BLD CALC-MCNC: 10.7 G/DL — LOW (ref 11.5–15.5)
HGB BLD-MCNC: 11.5 G/DL — SIGNIFICANT CHANGE UP (ref 11.5–15.5)
IANC: 5.7 K/UL — SIGNIFICANT CHANGE UP (ref 1.5–8.5)
IMM GRANULOCYTES NFR BLD AUTO: 0.5 % — SIGNIFICANT CHANGE UP (ref 0–1.5)
INR BLD: 1.11 RATIO — SIGNIFICANT CHANGE UP (ref 0.88–1.16)
LACTATE BLDV-MCNC: 0.5 MMOL/L — SIGNIFICANT CHANGE UP (ref 0.5–2)
LYMPHOCYTES # BLD AUTO: 1.37 K/UL — SIGNIFICANT CHANGE UP (ref 1–3.3)
LYMPHOCYTES # BLD AUTO: 17 % — SIGNIFICANT CHANGE UP (ref 13–44)
MCHC RBC-ENTMCNC: 30.2 PG — SIGNIFICANT CHANGE UP (ref 27–34)
MCHC RBC-ENTMCNC: 34.3 GM/DL — SIGNIFICANT CHANGE UP (ref 32–36)
MCV RBC AUTO: 87.9 FL — SIGNIFICANT CHANGE UP (ref 80–100)
MONOCYTES # BLD AUTO: 0.72 K/UL — SIGNIFICANT CHANGE UP (ref 0–0.9)
MONOCYTES NFR BLD AUTO: 9 % — SIGNIFICANT CHANGE UP (ref 2–14)
NEUTROPHILS # BLD AUTO: 5.7 K/UL — SIGNIFICANT CHANGE UP (ref 1.8–7.4)
NEUTROPHILS NFR BLD AUTO: 70.9 % — SIGNIFICANT CHANGE UP (ref 43–77)
NRBC # BLD: 0 /100 WBCS — SIGNIFICANT CHANGE UP
NRBC # FLD: 0 K/UL — SIGNIFICANT CHANGE UP
PCO2 BLDV: 46 MMHG — HIGH (ref 39–42)
PH BLDV: 7.39 — SIGNIFICANT CHANGE UP (ref 7.32–7.43)
PLATELET # BLD AUTO: 315 K/UL — SIGNIFICANT CHANGE UP (ref 150–400)
PO2 BLDV: 52 MMHG — SIGNIFICANT CHANGE UP
POTASSIUM BLDV-SCNC: 3.9 MMOL/L — SIGNIFICANT CHANGE UP (ref 3.5–5.1)
POTASSIUM SERPL-MCNC: 4.3 MMOL/L — SIGNIFICANT CHANGE UP (ref 3.5–5.3)
POTASSIUM SERPL-SCNC: 4.3 MMOL/L — SIGNIFICANT CHANGE UP (ref 3.5–5.3)
PROT SERPL-MCNC: 6.9 G/DL — SIGNIFICANT CHANGE UP (ref 6–8.3)
PROTHROM AB SERPL-ACNC: 12.6 SEC — SIGNIFICANT CHANGE UP (ref 10.6–13.6)
RBC # BLD: 3.81 M/UL — SIGNIFICANT CHANGE UP (ref 3.8–5.2)
RBC # FLD: 13 % — SIGNIFICANT CHANGE UP (ref 10.3–14.5)
SAO2 % BLDV: 84.9 % — SIGNIFICANT CHANGE UP
SODIUM SERPL-SCNC: 136 MMOL/L — SIGNIFICANT CHANGE UP (ref 135–145)
TROPONIN T, HIGH SENSITIVITY RESULT: 18 NG/L — SIGNIFICANT CHANGE UP
TROPONIN T, HIGH SENSITIVITY RESULT: 20 NG/L — SIGNIFICANT CHANGE UP
WBC # BLD: 8.04 K/UL — SIGNIFICANT CHANGE UP (ref 3.8–10.5)
WBC # FLD AUTO: 8.04 K/UL — SIGNIFICANT CHANGE UP (ref 3.8–10.5)

## 2021-11-18 PROCEDURE — 71046 X-RAY EXAM CHEST 2 VIEWS: CPT | Mod: 26

## 2021-11-18 PROCEDURE — 99285 EMERGENCY DEPT VISIT HI MDM: CPT

## 2021-11-18 NOTE — ED PROVIDER NOTE - PHYSICAL EXAMINATION
General: WN/WD NAD  Head: Atraumatic, normocephalic  Eyes: EOM grossly in tact, no scleral icterus  ENT: moist mucous membranes  Neurology: A&Ox3, nonfocal, FLYNN x 4, no facial asymmetry, strength 5/5 in B/L upper and lower extremities, sensation intact B/L   Respiratory: CTAB, no wheezing, normal respiratory effort, crackles in B/L lower lungs, PT o2 saturation 93% while sitting comfortably  CV: RRR, good s1/s2, no S3, Extremities warm and well perfused  Abdominal: Soft, non-distended, non-tender, no masses  Extremities: No edema, no deformities  Skin: warm and dry. No rashes

## 2021-11-18 NOTE — ED PROVIDER NOTE - OBJECTIVE STATEMENT
85 yo F with PMH of bronchiectasis, HTN presenting with shortness of breath and elevated BP. She goes on walks with her walker daily but today struggled to get home due to shortness of breath. She checked her BP and found it to be >200/100. She denies any fever, chills, chest pain, abdominal pain, dysuria, new vision changes, weakness, numbness or tingling. She received both COVID vaccines, History obtained with polish  Nicole (ID: [id number])    85 yo F with PMH of bronchiectasis, HTN presenting with shortness of breath and elevated BP. She goes on walks with her walker daily but today struggled to get home due to shortness of breath. She checked her BP and found it to be >200/100. She denies any fever, chills, chest pain, abdominal pain, dysuria, new vision changes, weakness, numbness or tingling. She received both COVID vaccines. She notes her BP is usually around 150's/90's. She denies any changes in appetite and says she's been drinking water.    Per son PT appeared more lethargic today. He said she generally can walk pretty far with her walker but today struggled due to shortness of breath.

## 2021-11-18 NOTE — ED ADULT NURSE NOTE - OBJECTIVE STATEMENT
A&Ox4. ambulatory with cane. c/o high blood pressure. NAD. pt denies chest pain, dizziness, n/v/d, HA, blurred vision, urinary symptoms. respirations are even and unlabored. ABD is soft, non tender, non distended with normal active bowel sounds. skin intact. 20g placed to left ac. waiting MD orders/eval.

## 2021-11-18 NOTE — ED PROVIDER NOTE - ATTENDING CONTRIBUTION TO CARE
I have personally performed a face to face medical and diagnostic evaluation of the patient. I have discussed with and reviewed the Resident's note and agree with the History, ROS, Physical Exam and MDM unless otherwise indicated. A brief summary of my personal evaluation and impression can be found below.    85yo F hx bronchiectasis, htn p/w concern of elevated BP at home to 200 systolic. Endorses chronic sob w/ exertion and thinks today could have been slightly worse but otherwise comfortable resting. No fever, chills, cp or exertional cp, abd pain, focal weakness/numbness, HA or vision changes  VITALS: Initial triage and subsequent vitals have been reviewed by me.  Gen: Well appearing, NAD, alert, non-toxic  Head: NCAT  HEENT: MMM, normal conjunctiva, anicteric, neck supple  Lung: CTAB, no adventitious sounds  CV: RRR, no murmurs, 2+symmetric peripheral pulses  Abd: soft, NTND, no rebound or guarding, no palpable masses  MSK: No edema, no visible deformities  Neuro: Moving all extremity grossly, following commands appropriately, fluid speech  Skin: Warm and dry, no evidence of rash  Psych: normal mood and affect  85yo F w/ some acute on chronic sob in setting of htn improving in ED. Benign physical exam and well appearing in no distress. Low suspicion acs or end organ damage will get labs, xr, ekg and if wnl can likely dc to fu outpt

## 2021-11-18 NOTE — ED ADULT NURSE NOTE - HISTORY OF COVID-19 VACCINATION
January 17, 2019      Kirt Cerrato  [address] [place] [id number]                      This is to certify that Kirt Cerrato was seen in my office for a complete physical exam on November 1, 2018.       If you have any questions or concerns, please feel free to contact my office at the number below.     Sincerely,          Steven J Heyden, MD  [facility] [address] [place] [address] [phone]  
Yes

## 2021-11-18 NOTE — ED PROVIDER NOTE - PATIENT PORTAL LINK FT
You can access the FollowMyHealth Patient Portal offered by [facility] by registering at the following website: [email or web address]. By joining [employer]’s FollowMyHealth portal, you will also be able to view your health information using other applications (apps) compatible with our system.

## 2021-11-18 NOTE — ED ADULT NURSE NOTE - CHIEF COMPLAINT QUOTE
Pt presents to ED ambulatory from home with c/o hypertension. Pt reports taking BP at home and found it to be 200/110. Pt denies headache, numbness, tingling, vision changes, weakness, or other neuro deficits. Pt has hx of HTN and is compliant with medications. Leonel Esther (son) [phone]

## 2021-11-18 NOTE — ED PROVIDER NOTE - PROGRESS NOTE DETAILS
pt comfortable and asymptomatic. Labs/xr unremarkable. Plan for outpt management of bp pt agrees and given return precautions

## 2021-11-18 NOTE — ED ADULT NURSE NOTE - DRUG PRE-SCREENING (DAST -1)
Nurse's notes reviewed and accepted.    SUBJECTIVE: The patient presents with complaints of nausea, vomiting and bilateral upper abdominal pain. He has had it for 3 days. He describes the pain as a cramp. It comes and goes. He knows of no exacerbating factors. Eating, BM, antacids have no effect. He denies fever, chills, diarrhea, constipation, BRBPR, melena, dysuria, hematuria.    OBJECTIVE: On today's exam the patient is in no acute distress. There is normal respiratory effort without use of accessory respiratory muscles or retraction of the intercostals. Lungs are clear to auscultation and percussion. Heart has a regular rate and rhythm, no murmur, rub or S3. Abdomen is soft, nontender. There is no hepatosplenomegaly or liver percussion tenderness. There are no other masses. Bowel sounds are present and normoactive. The aorta is not enlarged or tender. The inguinal canal is without adenopathy or mass. Genital exam not performed. The rectal exam is not performed. Extremities show no clubbing or edema, Adrián's negative bilaterally.     Pulse thin, thready.    Labs and Xrays OK.    IMPRESSION: 1) Gastroenteritis.                             2) Dehydration. Good response to IV fluids.    PLAN: 1) Zofran.              2) Clear liquid diet/bland foods.              3) Follow up with PMD in 4 days, sooner if worse.      Statement Selected

## 2021-11-18 NOTE — ED PROVIDER NOTE - NSFOLLOWUPINSTRUCTIONS_ED_ALL_ED_FT
Follow up with your cardiologist in the next 3-5 days to address your high blood pressure.     If you have any severe increase in pain, fever, uncontrollable nausea vomiting, or inability to tolerate eating and drinking you need to come right back to the emergency room. If you develop chest pain or your shortness of breath worsens, return to the emergency department.     You had a chest xray which was normal.     Take your paperwork and bring it when you follow up with your regular doctor.     Review the included pamphlet regarding high blood pressure.

## 2021-11-18 NOTE — ED PROVIDER NOTE - CLINICAL SUMMARY MEDICAL DECISION MAKING FREE TEXT BOX
87 yo F with PMH of bronchiectasis, HTN presenting with shortness of breath and elevated BP. This is concerning for ACS, pneumonia, or dehydration. will order troponin, CXR, covid test, cbc, cmp. ecg done on arrival. 87 yo F with PMH of bronchiectasis, HTN presenting with shortness of breath and elevated BP. This is concerning for ACS, pneumonia, or dehydration. will order troponin, CXR, covid test, cbc, cmp. ecg done on arrival.    Please see attending attestation.

## 2021-11-18 NOTE — ED ADULT TRIAGE NOTE - CHIEF COMPLAINT QUOTE
Pt presents to ED ambulatory from home with c/o hypertension. Pt reports taking BP at home and found it to be 200/110. Pt denies headache, numbness, tingling, vision changes, weakness, or other neuro deficits. Pt has hx of HTN and is compliant with medications. Leonel Esther (son) [phone]

## 2021-11-19 LAB — SARS-COV-2 RNA SPEC QL NAA+PROBE: SIGNIFICANT CHANGE UP

## 2021-11-20 ENCOUNTER — EMERGENCY (EMERGENCY)
Facility: HOSPITAL | Age: 86
LOS: 1 days | Discharge: HOME | End: 2021-11-20
Attending: EMERGENCY MEDICINE
Payer: MEDICARE

## 2021-11-20 VITALS
HEART RATE: 70 BPM | DIASTOLIC BLOOD PRESSURE: 73 MMHG | TEMPERATURE: 98 F | OXYGEN SATURATION: 96 % | RESPIRATION RATE: 17 BRPM | SYSTOLIC BLOOD PRESSURE: 157 MMHG

## 2021-11-20 VITALS
WEIGHT: 145.06 LBS | HEART RATE: 80 BPM | RESPIRATION RATE: 18 BRPM | SYSTOLIC BLOOD PRESSURE: 246 MMHG | HEIGHT: 61 IN | DIASTOLIC BLOOD PRESSURE: 93 MMHG | OXYGEN SATURATION: 95 % | TEMPERATURE: 98 F

## 2021-11-20 DIAGNOSIS — Z90.49 ACQUIRED ABSENCE OF OTHER SPECIFIED PARTS OF DIGESTIVE TRACT: Chronic | ICD-10-CM

## 2021-11-20 LAB
ALBUMIN SERPL ELPH-MCNC: 3.1 G/DL — LOW (ref 3.5–5)
ALP SERPL-CCNC: 107 U/L — SIGNIFICANT CHANGE UP (ref 40–120)
ALT FLD-CCNC: 32 U/L DA — SIGNIFICANT CHANGE UP (ref 10–60)
ANION GAP SERPL CALC-SCNC: 5 MMOL/L — SIGNIFICANT CHANGE UP (ref 5–17)
APPEARANCE UR: CLEAR — SIGNIFICANT CHANGE UP
AST SERPL-CCNC: 27 U/L — SIGNIFICANT CHANGE UP (ref 10–40)
BASOPHILS # BLD AUTO: 0.05 K/UL — SIGNIFICANT CHANGE UP (ref 0–0.2)
BASOPHILS NFR BLD AUTO: 0.5 % — SIGNIFICANT CHANGE UP (ref 0–2)
BILIRUB SERPL-MCNC: 0.7 MG/DL — SIGNIFICANT CHANGE UP (ref 0.2–1.2)
BILIRUB UR-MCNC: NEGATIVE — SIGNIFICANT CHANGE UP
BUN SERPL-MCNC: 15 MG/DL — SIGNIFICANT CHANGE UP (ref 7–18)
CALCIUM SERPL-MCNC: 9.1 MG/DL — SIGNIFICANT CHANGE UP (ref 8.4–10.5)
CHLORIDE SERPL-SCNC: 99 MMOL/L — SIGNIFICANT CHANGE UP (ref 96–108)
CO2 SERPL-SCNC: 27 MMOL/L — SIGNIFICANT CHANGE UP (ref 22–31)
COLOR SPEC: YELLOW — SIGNIFICANT CHANGE UP
CREAT SERPL-MCNC: 0.72 MG/DL — SIGNIFICANT CHANGE UP (ref 0.5–1.3)
DIFF PNL FLD: NEGATIVE — SIGNIFICANT CHANGE UP
EOSINOPHIL # BLD AUTO: 0.07 K/UL — SIGNIFICANT CHANGE UP (ref 0–0.5)
EOSINOPHIL NFR BLD AUTO: 0.7 % — SIGNIFICANT CHANGE UP (ref 0–6)
GLUCOSE SERPL-MCNC: 146 MG/DL — HIGH (ref 70–99)
GLUCOSE UR QL: NEGATIVE — SIGNIFICANT CHANGE UP
HCT VFR BLD CALC: 35.1 % — SIGNIFICANT CHANGE UP (ref 34.5–45)
HGB BLD-MCNC: 12.2 G/DL — SIGNIFICANT CHANGE UP (ref 11.5–15.5)
IMM GRANULOCYTES NFR BLD AUTO: 0.7 % — SIGNIFICANT CHANGE UP (ref 0–1.5)
KETONES UR-MCNC: NEGATIVE — SIGNIFICANT CHANGE UP
LEUKOCYTE ESTERASE UR-ACNC: NEGATIVE — SIGNIFICANT CHANGE UP
LYMPHOCYTES # BLD AUTO: 1.18 K/UL — SIGNIFICANT CHANGE UP (ref 1–3.3)
LYMPHOCYTES # BLD AUTO: 12.1 % — LOW (ref 13–44)
MCHC RBC-ENTMCNC: 30 PG — SIGNIFICANT CHANGE UP (ref 27–34)
MCHC RBC-ENTMCNC: 34.8 GM/DL — SIGNIFICANT CHANGE UP (ref 32–36)
MCV RBC AUTO: 86.2 FL — SIGNIFICANT CHANGE UP (ref 80–100)
MONOCYTES # BLD AUTO: 0.57 K/UL — SIGNIFICANT CHANGE UP (ref 0–0.9)
MONOCYTES NFR BLD AUTO: 5.8 % — SIGNIFICANT CHANGE UP (ref 2–14)
NEUTROPHILS # BLD AUTO: 7.84 K/UL — HIGH (ref 1.8–7.4)
NEUTROPHILS NFR BLD AUTO: 80.2 % — HIGH (ref 43–77)
NITRITE UR-MCNC: NEGATIVE — SIGNIFICANT CHANGE UP
NRBC # BLD: 0 /100 WBCS — SIGNIFICANT CHANGE UP (ref 0–0)
PH UR: 6.5 — SIGNIFICANT CHANGE UP (ref 5–8)
PLATELET # BLD AUTO: 321 K/UL — SIGNIFICANT CHANGE UP (ref 150–400)
POTASSIUM SERPL-MCNC: 3.8 MMOL/L — SIGNIFICANT CHANGE UP (ref 3.5–5.3)
POTASSIUM SERPL-SCNC: 3.8 MMOL/L — SIGNIFICANT CHANGE UP (ref 3.5–5.3)
PROT SERPL-MCNC: 7.5 G/DL — SIGNIFICANT CHANGE UP (ref 6–8.3)
PROT UR-MCNC: 15
RBC # BLD: 4.07 M/UL — SIGNIFICANT CHANGE UP (ref 3.8–5.2)
RBC # FLD: 12.7 % — SIGNIFICANT CHANGE UP (ref 10.3–14.5)
RBC CASTS # UR COMP ASSIST: SIGNIFICANT CHANGE UP /HPF (ref 0–2)
SODIUM SERPL-SCNC: 131 MMOL/L — LOW (ref 135–145)
SP GR SPEC: 1.01 — SIGNIFICANT CHANGE UP (ref 1.01–1.02)
TROPONIN I, HIGH SENSITIVITY RESULT: 13.1 NG/L — SIGNIFICANT CHANGE UP
UROBILINOGEN FLD QL: NEGATIVE — SIGNIFICANT CHANGE UP
WBC # BLD: 9.78 K/UL — SIGNIFICANT CHANGE UP (ref 3.8–10.5)
WBC # FLD AUTO: 9.78 K/UL — SIGNIFICANT CHANGE UP (ref 3.8–10.5)
WBC UR QL: SIGNIFICANT CHANGE UP /HPF (ref 0–5)

## 2021-11-20 PROCEDURE — 99291 CRITICAL CARE FIRST HOUR: CPT | Mod: 25

## 2021-11-20 PROCEDURE — 90471 IMMUNIZATION ADMIN: CPT

## 2021-11-20 PROCEDURE — 73130 X-RAY EXAM OF HAND: CPT

## 2021-11-20 PROCEDURE — 36415 COLL VENOUS BLD VENIPUNCTURE: CPT

## 2021-11-20 PROCEDURE — 81001 URINALYSIS AUTO W/SCOPE: CPT

## 2021-11-20 PROCEDURE — 84484 ASSAY OF TROPONIN QUANT: CPT

## 2021-11-20 PROCEDURE — 80053 COMPREHEN METABOLIC PANEL: CPT

## 2021-11-20 PROCEDURE — 12001 RPR S/N/AX/GEN/TRNK 2.5CM/<: CPT

## 2021-11-20 PROCEDURE — 71046 X-RAY EXAM CHEST 2 VIEWS: CPT

## 2021-11-20 PROCEDURE — 90715 TDAP VACCINE 7 YRS/> IM: CPT

## 2021-11-20 PROCEDURE — 85025 COMPLETE CBC W/AUTO DIFF WBC: CPT

## 2021-11-20 PROCEDURE — 87086 URINE CULTURE/COLONY COUNT: CPT

## 2021-11-20 PROCEDURE — 93005 ELECTROCARDIOGRAM TRACING: CPT

## 2021-11-20 PROCEDURE — 70450 CT HEAD/BRAIN W/O DYE: CPT | Mod: 26,MA

## 2021-11-20 PROCEDURE — 70450 CT HEAD/BRAIN W/O DYE: CPT | Mod: MA

## 2021-11-20 PROCEDURE — 72125 CT NECK SPINE W/O DYE: CPT | Mod: MA

## 2021-11-20 PROCEDURE — 73130 X-RAY EXAM OF HAND: CPT | Mod: 26,RT

## 2021-11-20 PROCEDURE — 71046 X-RAY EXAM CHEST 2 VIEWS: CPT | Mod: 26

## 2021-11-20 PROCEDURE — 96374 THER/PROPH/DIAG INJ IV PUSH: CPT | Mod: XU

## 2021-11-20 PROCEDURE — 72125 CT NECK SPINE W/O DYE: CPT | Mod: 26,MA

## 2021-11-20 RX ORDER — LABETALOL HCL 100 MG
20 TABLET ORAL ONCE
Refills: 0 | Status: COMPLETED | OUTPATIENT
Start: 2021-11-20 | End: 2021-11-20

## 2021-11-20 RX ORDER — TETANUS TOXOID, REDUCED DIPHTHERIA TOXOID AND ACELLULAR PERTUSSIS VACCINE, ADSORBED 5; 2.5; 8; 8; 2.5 [IU]/.5ML; [IU]/.5ML; UG/.5ML; UG/.5ML; UG/.5ML
0.5 SUSPENSION INTRAMUSCULAR ONCE
Refills: 0 | Status: COMPLETED | OUTPATIENT
Start: 2021-11-20 | End: 2021-11-20

## 2021-11-20 RX ADMIN — TETANUS TOXOID, REDUCED DIPHTHERIA TOXOID AND ACELLULAR PERTUSSIS VACCINE, ADSORBED 0.5 MILLILITER(S): 5; 2.5; 8; 8; 2.5 SUSPENSION INTRAMUSCULAR at 09:21

## 2021-11-20 RX ADMIN — Medication 20 MILLIGRAM(S): at 09:21

## 2021-11-20 NOTE — ED PROVIDER NOTE - CARE PLAN
1 Principal Discharge DX:	Contusion of scalp  Secondary Diagnosis:	Hypertension  Secondary Diagnosis:	Finger laceration

## 2021-11-20 NOTE — ED PROVIDER NOTE - CLINICAL SUMMARY MEDICAL DECISION MAKING FREE TEXT BOX
86 yr old female with hx of HTN, lung disease never smoked presents to ed c/o mechanical fall pta. pt states she heard the fire alarm and went to neighbor for assistance then as she walked back she slipped on the step. fell onto groun, + head strike, no loc, no ac use, no cp, no visual changes, no n/v.    mechanical fall r/o sah vs ich. benign HTN asx- labs, ekg, cxr, labetalol, re-assess

## 2021-11-20 NOTE — ED PROVIDER NOTE - PROGRESS NOTE DETAILS
Baig: improve. work up shows no acute injury. large scalp hematoma of left temporal without ich  bacitracin to area BID, keep area dry/clean.  monitor for infection (redness/pus,worsening pain, loss of mobility with increase swelling).  ok to wash after 1 day running soap and water.  wound check in 3 days and suture will fall off

## 2021-11-20 NOTE — ED PROVIDER NOTE - NSFOLLOWUPINSTRUCTIONS_ED_ALL_ED_FT
bacitracin to area 2x/day, keep area dry/clean.  monitor for infection (redness/pus,worsening pain, loss of mobility with increase swelling).  ok to wash after 1 day running soap and water.  wound check in 3 days and suture will fall off- if not by 14 days have it removed.    Blood pressure and heart rate check in morning, afternoon and evening for 1 week, write on paper and see your MD for medication changes.

## 2021-11-20 NOTE — ED PROVIDER NOTE - PATIENT PORTAL LINK FT
You can access the FollowMyHealth Patient Portal offered by [facility] by registering at the following website: [email or web address]. By joining [employer]’s FollowMyHealth portal, you will also be able to view your health information using other applications (apps) compatible with our system.

## 2021-11-20 NOTE — ED ADULT TRIAGE NOTE - CHIEF COMPLAINT QUOTE
PT REPORTS WALKING UP THE STAIRS AND FELL HITTING LEFT SIDE OF FACE. HEMATOMA TO LEFT FOREHEAD AND LACERATION TO RIGHT THUMB. DENIES DIZZINESS, LIGHTHEADEDNESS OR HEADACHE PT REPORTS WALKING UP THE STAIRS AND FELL HITTING LEFT SIDE OF FACE. HEMATOMA TO LEFT FOREHEAD AND LACERATION TO RIGHT THUMB. DENIES DIZZINESS, LIGHTHEADEDNESS OR HEADACHE. EMS REPORTS BP IN THE FIELD /76 PT REPORTS WALKING UP THE STAIRS AND FELL HITTING LEFT SIDE OF FACE. HEMATOMA TO LEFT FOREHEAD AND LACERATION TO RIGHT THUMB. DENIES DIZZINESS, LIGHTHEADEDNESS OR HEADACHE. EMS REPORTS BP IN THE FIELD /76. DENIES LOC

## 2021-11-20 NOTE — ED ADULT NURSE NOTE - OBJECTIVE STATEMENT
Patient A&OX3 s/p fall while climbing stairs laceration to right thumb, left sided head bump with bruising , patient c/o pain 8/10 as per son at bedside patient usually uses cane and was not using it. Patient does not remember much

## 2021-11-20 NOTE — ED ADULT NURSE NOTE - CHIEF COMPLAINT QUOTE
PT REPORTS WALKING UP THE STAIRS AND FELL HITTING LEFT SIDE OF FACE. HEMATOMA TO LEFT FOREHEAD AND LACERATION TO RIGHT THUMB. DENIES DIZZINESS, LIGHTHEADEDNESS OR HEADACHE. EMS REPORTS BP IN THE FIELD /76. DENIES LOC

## 2021-11-22 LAB
CULTURE RESULTS: SIGNIFICANT CHANGE UP
SPECIMEN SOURCE: SIGNIFICANT CHANGE UP

## 2021-11-22 NOTE — ED POST DISCHARGE NOTE - DETAILS
spoke to son duc  pt feeling better no sob no increased pain will bring pt back if any more pain or trouble breathing

## 2022-01-19 NOTE — ED ADULT NURSE NOTE - MODE OF DISCHARGE
Admitted with hemoptysis and flank/back pain. 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  Patient has mediastinal adenopathy and liver densities that have enlarged in two years time. IR consulted, will plan for liver biopsy as outpatient. Pulm consulted, recommended follow up in pulmonary clinic in 2-4 weeks (preferably after IR biopsy complete). Of note, had low grade fever of 100.1 F while admitted, empiric IV antibiotics were initiated but discontinued the next day as no other signs or symptoms of infection. WBC 6.43 and blood cultures NGTD. Discharged with ambulatory referrals to IR and pulm. Discontinued home ASA 81 mg with recent hemoptysis and unknown indication for medication (no hx of ischemia).   
Wheelchair

## 2022-01-27 NOTE — PHYSICAL THERAPY INITIAL EVALUATION ADULT - IMPAIRMENTS CONTRIBUTING TO GAIT DEVIATIONS, PT EVAL
decreased strength/impaired balance/impaired postural control Length To Time In Minutes Device Was In Place: 10

## 2022-05-10 NOTE — ED PROVIDER NOTE - IV ALTEPLASE DOOR HIDDEN
Patient : Ta Armendariz Age: 42 year old Sex: male   MRN: [id number] Encounter Date: 5/9/2022    E15/15    History     Chief Complaint   Patient presents with   • Allergic Reaction     HPI   5/9/2022  7:56 PM Ta Armendariz is a 42 year old male who presents to the ED for evaluation of a severe itching, rash, and lip swelling that began shortly PTA. He states that he was shaking out a new tent and debris got in his face shortly before his symptoms began. Pt reports allergy to amoxicillin. He states that he is currently taking course of Augmentin for tooth infection (has had total of 9 days, 4x daily).  He denies any chronic medical diseases or other daily medication use. He is a current smoker but denies any alcohol or drug usage. There are no further complaints or modifying factors at this time.    Current Discharge Medication List      New Prescriptions    Details   predniSONE (DELTASONE) 20 MG tablet Take 2 tablets by mouth daily.  Qty: 10 tablet, Refills: 0      EPINEPHrine 0.3 MG/0.3ML auto-injector Inject 0.3 mLs into the muscle 1 time as needed for Anaphylaxis.  Qty: 1 each, Refills: 1      diphenhydrAMINE (Benadryl Allergy) 25 MG tablet Take 1 tablet by mouth 3 times daily as needed for Itching.  Qty: 30 tablet, Refills: 0      famotidine (Pepcid) 20 MG tablet Take 1 tablet by mouth 2 times daily.  Qty: 15 tablet, Refills: 0             History reviewed. No pertinent past medical history.    History reviewed. No pertinent past surgical history.    History reviewed. No pertinent family history.      Social History     Tobacco Use   • Smoking status: Not on file   • Smokeless tobacco: Not on file   Substance Use Topics   • Alcohol use: Not on file   • Drug use: Not on file       E-cigarette/Vaping     E-Cigarette/Vaping Substances & Devices       Review of Systems   Constitutional: Negative for chills, diaphoresis and fever.   HENT: Positive for facial swelling (lip).    Eyes: Negative.    Respiratory: Negative  for cough, shortness of breath and wheezing.    Cardiovascular: Negative for chest pain, palpitations and leg swelling.   Gastrointestinal: Negative for abdominal pain, constipation, diarrhea, nausea and vomiting.   Genitourinary: Negative for difficulty urinating, dysuria, flank pain and hematuria.   Musculoskeletal: Negative for back pain, joint swelling and myalgias.   Skin: Positive for rash. Negative for color change.   Allergic/Immunologic: Negative for food allergies.   Neurological: Negative for dizziness, weakness, light-headedness, numbness and headaches.   Hematological: Does not bruise/bleed easily.   Psychiatric/Behavioral: Negative for confusion and suicidal ideas. The patient is not nervous/anxious.    All other systems reviewed and are negative.      Physical Exam     ED Triage Vitals [05/09/22 2001]   ED Triage Vitals Group      Temp 97.8 °F (36.6 °C)      Heart Rate 88      Resp 18      BP (!) 143/75      SpO2 95 %      EtCO2 mmHg       Height 6' (1.829 m)      Weight 300 lb (136.1 kg)      Weight Scale Used ED Estimate      BMI (Calculated) 40.69      IBW/kg (Calculated) 77.6       Physical Exam  Vitals and nursing note reviewed.   Constitutional:       Appearance: Normal appearance. He is well-developed. He is not toxic-appearing.   HENT:      Head: Normocephalic and atraumatic.      Nose: Nose normal.      Mouth/Throat:      Pharynx: Uvula midline.      Comments: Mild upper and lower lip swelling  No tongue swelling  Can visualize the uvula and posterior oropharynx  Eyes:      Conjunctiva/sclera: Conjunctivae normal.      Pupils: Pupils are equal, round, and reactive to light.   Neck:      Trachea: Trachea normal.   Cardiovascular:      Rate and Rhythm: Normal rate and regular rhythm.      Heart sounds: Normal heart sounds, S1 normal and S2 normal. No murmur heard.    No gallop.   Pulmonary:      Effort: Pulmonary effort is normal. No tachypnea or respiratory distress.      Breath sounds: No  stridor. No wheezing (focal).      Comments: Coarse breath sounds  Abdominal:      General: Bowel sounds are normal.      Palpations: Abdomen is soft. There is no mass.      Tenderness: There is no abdominal tenderness. There is no guarding or rebound.   Musculoskeletal:         General: No tenderness. Normal range of motion.      Cervical back: Normal range of motion and neck supple.   Lymphadenopathy:      Cervical: No cervical adenopathy.   Skin:     General: Skin is warm and dry.      Comments: Diffuse erythematous rash to torso, arms, and face   Neurological:      Mental Status: He is alert and oriented to person, place, and time.      GCS: GCS eye subscore is 4. GCS verbal subscore is 5. GCS motor subscore is 6.      Cranial Nerves: No cranial nerve deficit.      Sensory: No sensory deficit.      Deep Tendon Reflexes: Reflexes are normal and symmetric.   Psychiatric:         Speech: Speech normal.         Behavior: Behavior normal.          ED Course     Procedures    Lab Results     Results for orders placed or performed during the hospital encounter of 05/09/22   Comprehensive Metabolic Panel   Result Value Ref Range    Fasting Status      Sodium 141 135 - 145 mmol/L    Potassium 3.9 3.4 - 5.1 mmol/L    Chloride 108 (H) 98 - 107 mmol/L    Carbon Dioxide 27 21 - 32 mmol/L    Anion Gap 10 10 - 20 mmol/L    Glucose 167 (H) 70 - 99 mg/dL    BUN 20 6 - 20 mg/dL    Creatinine 0.92 0.67 - 1.17 mg/dL    Glomerular Filtration Rate >90 >=60    BUN/ Creatinine Ratio 22 7 - 25    Calcium 8.9 8.4 - 10.2 mg/dL    Bilirubin, Total 0.3 0.2 - 1.0 mg/dL    GOT/AST 30 <=37 Units/L    GPT/ALT 20 <64 Units/L    Alkaline Phosphatase 62 45 - 117 Units/L    Albumin 3.7 3.6 - 5.1 g/dL    Protein, Total 7.2 6.4 - 8.2 g/dL    Globulin 3.5 2.0 - 4.0 g/dL    A/G Ratio 1.1 1.0 - 2.4   CBC with Automated Differential (performable only)   Result Value Ref Range    WBC 10.3 4.2 - 11.0 K/mcL    RBC 5.27 4.50 - 5.90 mil/mcL    HGB 15.4 13.0 -  17.0 g/dL    HCT 46.4 39.0 - 51.0 %    MCV 88.0 78.0 - 100.0 fl    MCH 29.2 26.0 - 34.0 pg    MCHC 33.2 32.0 - 36.5 g/dL    RDW-CV 14.0 11.0 - 15.0 %    RDW-SD 44.7 39.0 - 50.0 fL     140 - 450 K/mcL    NRBC 0 <=0 /100 WBC    Neutrophil, Percent 67 %    Lymphocytes, Percent 26 %    Mono, Percent 6 %    Eosinophils, Percent 1 %    Basophils, Percent 0 %    Immature Granulocytes 0 %    Absolute Neutrophils 6.9 1.8 - 7.7 K/mcL    Absolute Lymphocytes 2.7 1.0 - 4.8 K/mcL    Absolute Monocytes 0.6 0.3 - 0.9 K/mcL    Absolute Eosinophils  0.1 0.0 - 0.5 K/mcL    Absolute Basophils 0.0 0.0 - 0.3 K/mcL    Absolute Immmature Granulocytes 0.0 0.0 - 0.2 K/mcL       EKG Results     Results for orders placed or performed during the hospital encounter of 05/09/22   Electrocardiogram 12-Lead   Result Value Ref Range    Systolic Blood Pressure 143     Diastolic Blood Pressure 75     Ventricular Rate EKG/Min (BPM) 83     Atrial Rate (BPM) 83     ND-Interval (MSEC) 142     QRS-Interval (MSEC) 98     QT-Interval (MSEC) 372     QTc 437     P Axis (Degrees) 19     R Axis (Degrees) 82     T Axis (Degrees) 59     REPORT TEXT       Normal sinus rhythm  Normal ECG  No previous ECGs available  Confirmed by RENEE CLAYTON EDUARDO ([id number]),  Fatuma Ramos ([id number]) on 5/9/2022 8:12:39 PM           Radiology Results     Imaging Results          XR Chest AP or PA (Final result)  Result time 05/09/22 21:11:35    Final result                 Impression:    FINDINGS/IMPRESSION:      The heart size and central vasculature are within normal limits. There is  no evidence of dense focal consolidation or significant effusion.               Narrative:    EXAM: XR CHEST AP OR PA    INDICATION:  SOB    COMPARISON:  None.                                ED Medication Orders (From admission, onward)    Ordered Start     Status Ordering Provider    05/09/22 1959 05/09/22 2000  diphenhydrAMINE (BENADRYL) injection 50 mg  (diphenhydrAMINE)  ONCE          Last MAR action: Given CLAYTON VIKTORIA    05/09/22 1959 05/09/22 2000  famotidine (PEPCID) injection 20 mg  (famotidine)  ONCE         Last MAR action: Given CLAYTONVIKTORIA CEJA    05/09/22 1959 05/09/22 2000  methylPREDNISolone (SOLU-Medrol) PF injection 125 mg  (glucocorticoids)  ONCE         Last MAR action: Given VIKTORIA CLAYTON    05/09/22 1959 05/09/22 2000  EPINEPHrine (ADRENALIN) injection 0.3 mg  (EPINEPHrine)  ONCE         Last MAR action: Given VIKTORIA CLAYTON    05/09/22 1959 05/09/22 2000  lactated ringers infusion  CONTINUOUS         Last MAR action: New Bag HEAVEN CLAYTONARDO               [facility]    Vitals  Vitals:    05/09/22 2224 05/09/22 2243 05/09/22 2258 05/09/22 2303   BP:  138/84 135/82    BP Location:       Patient Position:       Pulse: 72 72 72 72   Resp: 15 19  18   Temp:       TempSrc:       SpO2: 95% 93% 92% 96%   Weight:       Height:           ED Course  8:42 PM - Girlfriend is now present at bedside and brought the bottle of cephalexin that patient is currently taking for his tooth. She states that he has an extensive history of allergies aside from amoxicillin. I explained to them that we are treating the histamine mediated portion of the allergic reaction as well as the cell mediated immune response. We will continue to monitor the patient over the next several hours.     10:51 PM I reevaluated the patient who appears to be sleeping comfortably in bed. He reports that he still feels sleepy from the medication so plan is to monitor the patient for another hour.    11:59 p.m. the patient is now awake and alert as his medication has worn off.  He has no respiratory distress, minimal upper lip swelling and his urticaria has resolved.  We discussed continued management of an allergic process at home.  We let the patient know that he can return to the emergency department at any point for increased rash, itching, swelling, respiratory distress or fever.  Patient and his significant other acknowledge  the parameters for which she should return to the emergency department are discharged home in stable condition.    MDM  Critical Care    No Critical Care    Clinical Impression and Diagnosis  11:20 PM       ED Diagnosis        Final diagnosis    Acute allergic reaction, initial encounter                The patient was provided with a recommendation to follow up with a primary care provider and obtain reassessment of his/her blood pressure within three months.    Follow Up:  No follow-up provider specified.        Summary of your Discharge Medications      Take these Medications      Details   diphenhydrAMINE 25 MG tablet  Commonly known as: Benadryl Allergy   Take 1 tablet by mouth 3 times daily as needed for Itching.     EPINEPHrine 0.3 MG/0.3ML auto-injector   Inject 0.3 mLs into the muscle 1 time as needed for Anaphylaxis.     famotidine 20 MG tablet  Commonly known as: Pepcid   Take 1 tablet by mouth 2 times daily.     predniSONE 20 MG tablet  Commonly known as: DELTASONE   Take 2 tablets by mouth daily.            Pt is discharged to home/self care in stable condition.         I have reviewed the information recorded by the scribe for accuracy and agree with its contents.    ____________________________________________________________________    Randi Ballard acting as a scribe for Dr. Nico Vasquez  Dictation # [id number]  Scribe: Randi Vasquez MD  05/10/22 0016     show

## 2022-07-13 NOTE — ED PROVIDER NOTE - NEURO NEGATIVE STATEMENT, MLM
Patient chief complaint: patient was sent over from infectious disease for concerns with BP. She states she is taking metoprolol XL as prescribed. She has a hx of CVA. She mentions headaches recently daily, resolved by tylenol. On exam there are no neurological deficits however based on hx and the fact she is taking her BP meds as prescribed she will go to ER for further evaluation. She will go via private to [facility] [place]. Patient agreed to plan of care. Will not treat in clinic, not appropriate based on hx and she has taken her metoprolol this morning as prescribed. Dx: HTN Emergency  PE:   Vitals reviewed,     Pt A &O x 4  Skin w/d/i       I performed and completed a Triage Screening Examination for this patient. It was determined further evaluation, testing, and treatment were needed to complete the Medical Screening Exam. Please refer to disposition providerâs documentation for detailed History, Physical Exam, Test Results, Medical Decision Making and Disposition       Pt directed to ED for higher level care.
no loss of consciousness, no gait abnormality, no headache, no sensory deficits, and no weakness.

## 2022-08-09 NOTE — ED PROVIDER NOTE - TEMPLATE, MLM
Quality 130: Documentation Of Current Medications In The Medical Record: Current Medications Documented Additional Notes: Medications complete. Detail Level: Detailed Cardiac

## 2023-01-09 NOTE — PHYSICAL THERAPY INITIAL EVALUATION ADULT - NS ASR RISK AREAS PT EVAL
--------------------------------------------------------------------------------------------------------------  [facility]    Monday - Friday 8 a.m. - 8 p.m.    Saturday - Sunday 8 a.m. - 4 p.m.    ----------------  [email or web address]  See current wait times for [facility] in real-time!  Reserve your waiting-room spot in line!   Receive text/email messages if our wait times are long,  so that you can do your waiting at home or work, instead of in our waiting room.     Note: This system does not guarantee an \"appointment time\" to see a provider. Also, patients may be called out of the waiting room \"ahead of turn\" in emergency situations, at discretion of Urgent Care staff.    ----------------    Thank you for choosing [facility].    We hope you had a pleasant experience and we look forward to serving your future needs.   If you receive a survey in the mail about today's services, we hope that you will take a few minutes to let us know about your experience.    If you have any questions about your VISIT, please call [phone]    If you have any questions about your BILL, please call [phone].    If you need a copy of your MEDICAL RECORD, please call [phone] or email [email or web address]    --------------------------------------------------------------------------------------------------------------  UNLESS OTHERWISE INSTRUCTED BY YOUR URGENT CARE PROVIDER TODAY, all follow-up for your medical issues should be managed by your primary care provider.  The Urgent Care does not manage chronic medical issues or refill medications.  You are responsible for scheduling and keeping any necessary follow-up visits with your primary care provider after this visit today.   --------------------------------------------------------------------------------------------------------------  IF YOU WERE PRESCRIBED AN ANTIBIOTIC TODAY:  We recommend taking an  over-the-counter probiotic (Such as Florajen 3 for adults or Trxlxnst9Oecs for children -- AVAILABLE IN THE [facility] and other local pharmacies too) once a day for the entire duration of your antibiotics and continuing it for 2 weeks after the antibiotics are finished.  This will help reduce your chance of developing antibiotic-related diarrhea and/or yeast infections.  --------------------------------------------------------------------------------------------------------------  IF YOU HAVE LAB RESULTS or XRAY REPORTS STILL PENDING: We typically call patients back only if (1) the results are \"significantly abnormal\", (2) we need to change your treatment plan based on the results, or (3) the report is different than what we told you during your visit. If we have not called you about your results 48 hours after your visit, you can call us at [phone] to check on the status.  --------------------------------------------------------------------------------------------------------------       fall

## 2023-02-28 NOTE — ED ADULT NURSE NOTE - NURSING SKIN WOUND APPEAR #1
50 Y.o male states hx of HLD not on med sent from his PMD's office when he was found to have new TWI in anterior leads. Subsequently sent here for further evaluation. Denies any active chest pain or SOB. Does feel fatigued and has felt more tired the past two weeks. states some times feels left side of the chest pain  when he works and lift heavy . denies any smoking or drinking or use drugs . he received asa at his primary care clinic as per pt he denies any hx of CAD in his family mom or dad  PT's EKG has Inverted T On V1,2,3 No St elevation   added serial trop and x 2 with EkG   pro BNP   mild elevated DD: Dr barakat spoke with [facility] as per DR fitzpatrick dose not need Ac therapy    ECHo and CCTA in AM   RVP pending  elevated LFT possible fatty liver - abd exam Wnl and possible lipoma bleeding minimally

## 2023-06-30 NOTE — ED ADULT TRIAGE NOTE - ARRIVAL FROM
Home 76-year-old man history of hyperlipidemia hypothyroid BPH status post UroLift June 27 presenting here complaining of decreased urine output in his Garcia bag since 12 PM and bleeding around the penile tip no fevers chills   CONSTITUTIONAL: WA / WN / NAD  HEAD: NCAT  EYES: PERRL; EOMI;   NECK: Supple;  ABD: Soft, mildly distended   MSK/EXT: No gross deformities; full range of motion.  SKIN: Warm and dry;   NEURO: AAOx3,  PSYCH: Memory Intact, Normal Affect

## 2023-09-15 NOTE — H&P ADULT - NSHPPOAPULMEMBOLUS_GEN_A_CORE
Tanesha Haque RN  09/15/23 077 Deja Watts RN  09/15/23 [id number] no

## 2024-03-15 NOTE — DISCHARGE NOTE PROVIDER - NSDCCPCAREPLAN_GEN_ALL_CORE_FT
No PRINCIPAL DISCHARGE DIAGNOSIS  Diagnosis: Closed fracture of ramus of right pubis, initial encounter  Assessment and Plan of Treatment: Weight bearing as tolerated with assistive device  Follow up with Orthopedics Dr. Ruiz 2 weeks after discharge. Call to schedule an appointment.  Follow up with Primary care provider at the Rehab facility      SECONDARY DISCHARGE DIAGNOSES  Diagnosis: Hyponatremia  Assessment and Plan of Treatment: Follow up with Primary care provider at the Rehab facility    Diagnosis: Essential hypertension  Assessment and Plan of Treatment: continue Metoprolol, Losartan    Diagnosis: Bronchiectasis without complication  Assessment and Plan of Treatment: Follow up with Primary care provider at the Rehab facility     PRINCIPAL DISCHARGE DIAGNOSIS  Diagnosis: Closed fracture of ramus of right pubis, initial encounter  Assessment and Plan of Treatment: Xrays showed minimally discplaced fractures of bryant right superior and inferior pubic rami.   - Orthopedic team was consulted.  - no acute orthopedic intervention indicated at this time  - weight bearing as tolerated with assistive devices as needed for comfort  - encourage ambulation  - pain control  *****Follow up with Orthopedics Dr. Ruiz 2 weeks after discharge. Call to schedule an appointment.  Please follow up with your primary care provider within 1 week of discharge from the hospital. If you do not have a primary care provider please follow up with the [facility], call [phone]         SECONDARY DISCHARGE DIAGNOSES  Diagnosis: Hyponatremia  Assessment and Plan of Treatment: Your sodium level was foudn to be low, imporved overall. Sodium level 132 on 5/18/2021. Please follow up with your primary care provider within 1 week of discharge from the hospital for repeat blood work adn further maangement. If you do not have a primary care provider please follow up with the [facility], call [phone]    Diagnosis: Bronchiectasis without complication  Assessment and Plan of Treatment: Continue medications as prescribed and follow-up with your primary care provider/pulmonologist for further care/recommendations. Monitor for signs/symptoms of shortness of breath, increased sputum production, increased cough, wheezing, difficulty breathing, or fever - Report to the emergency room if symptoms are not relieved by usual regimen.    Diagnosis: Essential hypertension  Assessment and Plan of Treatment: Continue blood pressure medication regimen as directed. Monitor for any visual changes, headaches or dizziness.  Monitor blood pressure regularly.  Follow up with your PCP for further management for high blood pressure.

## 2025-01-05 ENCOUNTER — EMERGENCY (EMERGENCY)
Facility: HOSPITAL | Age: 89
LOS: 1 days | Discharge: HOME | End: 2025-01-05
Attending: STUDENT IN AN ORGANIZED HEALTH CARE EDUCATION/TRAINING PROGRAM
Payer: MEDICARE

## 2025-01-05 VITALS
TEMPERATURE: 98 F | OXYGEN SATURATION: 95 % | HEIGHT: 64 IN | SYSTOLIC BLOOD PRESSURE: 159 MMHG | RESPIRATION RATE: 19 BRPM | WEIGHT: 139.99 LBS | DIASTOLIC BLOOD PRESSURE: 84 MMHG | HEART RATE: 69 BPM

## 2025-01-05 DIAGNOSIS — Z90.49 ACQUIRED ABSENCE OF OTHER SPECIFIED PARTS OF DIGESTIVE TRACT: Chronic | ICD-10-CM

## 2025-01-05 LAB
ALBUMIN SERPL ELPH-MCNC: 3.6 G/DL — SIGNIFICANT CHANGE UP (ref 3.3–5)
ALP SERPL-CCNC: 80 U/L — SIGNIFICANT CHANGE UP (ref 40–120)
ALT FLD-CCNC: 30 U/L — SIGNIFICANT CHANGE UP (ref 10–45)
ANION GAP SERPL CALC-SCNC: 12 MMOL/L — SIGNIFICANT CHANGE UP (ref 5–17)
APPEARANCE UR: CLEAR — SIGNIFICANT CHANGE UP
AST SERPL-CCNC: 38 U/L — SIGNIFICANT CHANGE UP (ref 10–40)
BACTERIA # UR AUTO: NEGATIVE /HPF — SIGNIFICANT CHANGE UP
BASOPHILS # BLD AUTO: 0.04 K/UL — SIGNIFICANT CHANGE UP (ref 0–0.2)
BASOPHILS NFR BLD AUTO: 0.4 % — SIGNIFICANT CHANGE UP (ref 0–2)
BILIRUB SERPL-MCNC: 0.4 MG/DL — SIGNIFICANT CHANGE UP (ref 0.2–1.2)
BILIRUB UR-MCNC: NEGATIVE — SIGNIFICANT CHANGE UP
BUN SERPL-MCNC: 14 MG/DL — SIGNIFICANT CHANGE UP (ref 7–23)
CALCIUM SERPL-MCNC: 9.9 MG/DL — SIGNIFICANT CHANGE UP (ref 8.4–10.5)
CAST: 0 /LPF — SIGNIFICANT CHANGE UP (ref 0–4)
CHLORIDE SERPL-SCNC: 98 MMOL/L — SIGNIFICANT CHANGE UP (ref 96–108)
CO2 SERPL-SCNC: 27 MMOL/L — SIGNIFICANT CHANGE UP (ref 22–31)
COLOR SPEC: YELLOW — SIGNIFICANT CHANGE UP
CREAT SERPL-MCNC: 0.71 MG/DL — SIGNIFICANT CHANGE UP (ref 0.5–1.3)
DIFF PNL FLD: NEGATIVE — SIGNIFICANT CHANGE UP
EGFR: 82 ML/MIN/1.73M2 — SIGNIFICANT CHANGE UP
EOSINOPHIL # BLD AUTO: 0.22 K/UL — SIGNIFICANT CHANGE UP (ref 0–0.5)
EOSINOPHIL NFR BLD AUTO: 2.4 % — SIGNIFICANT CHANGE UP (ref 0–6)
GLUCOSE SERPL-MCNC: 116 MG/DL — HIGH (ref 70–99)
GLUCOSE UR QL: NEGATIVE MG/DL — SIGNIFICANT CHANGE UP
HCT VFR BLD CALC: 33.5 % — LOW (ref 34.5–45)
HGB BLD-MCNC: 11.4 G/DL — LOW (ref 11.5–15.5)
IMM GRANULOCYTES NFR BLD AUTO: 0.9 % — SIGNIFICANT CHANGE UP (ref 0–0.9)
KETONES UR-MCNC: NEGATIVE MG/DL — SIGNIFICANT CHANGE UP
LEUKOCYTE ESTERASE UR-ACNC: ABNORMAL
LIDOCAIN IGE QN: 59 U/L — SIGNIFICANT CHANGE UP (ref 7–60)
LYMPHOCYTES # BLD AUTO: 1.73 K/UL — SIGNIFICANT CHANGE UP (ref 1–3.3)
LYMPHOCYTES # BLD AUTO: 19.1 % — SIGNIFICANT CHANGE UP (ref 13–44)
MAGNESIUM SERPL-MCNC: 2 MG/DL — SIGNIFICANT CHANGE UP (ref 1.6–2.6)
MCHC RBC-ENTMCNC: 30.6 PG — SIGNIFICANT CHANGE UP (ref 27–34)
MCHC RBC-ENTMCNC: 34 G/DL — SIGNIFICANT CHANGE UP (ref 32–36)
MCV RBC AUTO: 90.1 FL — SIGNIFICANT CHANGE UP (ref 80–100)
MONOCYTES # BLD AUTO: 0.88 K/UL — SIGNIFICANT CHANGE UP (ref 0–0.9)
MONOCYTES NFR BLD AUTO: 9.7 % — SIGNIFICANT CHANGE UP (ref 2–14)
NEUTROPHILS # BLD AUTO: 6.1 K/UL — SIGNIFICANT CHANGE UP (ref 1.8–7.4)
NEUTROPHILS NFR BLD AUTO: 67.5 % — SIGNIFICANT CHANGE UP (ref 43–77)
NITRITE UR-MCNC: NEGATIVE — SIGNIFICANT CHANGE UP
NRBC # BLD: 0 /100 WBCS — SIGNIFICANT CHANGE UP (ref 0–0)
PH UR: 8 — SIGNIFICANT CHANGE UP (ref 5–8)
PHOSPHATE SERPL-MCNC: 3.1 MG/DL — SIGNIFICANT CHANGE UP (ref 2.5–4.5)
PLATELET # BLD AUTO: 255 K/UL — SIGNIFICANT CHANGE UP (ref 150–400)
POTASSIUM SERPL-MCNC: 4.1 MMOL/L — SIGNIFICANT CHANGE UP (ref 3.5–5.3)
POTASSIUM SERPL-SCNC: 4.1 MMOL/L — SIGNIFICANT CHANGE UP (ref 3.5–5.3)
PROT SERPL-MCNC: 6.7 G/DL — SIGNIFICANT CHANGE UP (ref 6–8.3)
PROT UR-MCNC: NEGATIVE MG/DL — SIGNIFICANT CHANGE UP
RBC # BLD: 3.72 M/UL — LOW (ref 3.8–5.2)
RBC # FLD: 13.3 % — SIGNIFICANT CHANGE UP (ref 10.3–14.5)
RBC CASTS # UR COMP ASSIST: 1 /HPF — SIGNIFICANT CHANGE UP (ref 0–4)
SODIUM SERPL-SCNC: 137 MMOL/L — SIGNIFICANT CHANGE UP (ref 135–145)
SP GR SPEC: 1.01 — SIGNIFICANT CHANGE UP (ref 1–1.03)
SQUAMOUS # UR AUTO: 3 /HPF — SIGNIFICANT CHANGE UP (ref 0–5)
UROBILINOGEN FLD QL: 0.2 MG/DL — SIGNIFICANT CHANGE UP (ref 0.2–1)
WBC # BLD: 9.05 K/UL — SIGNIFICANT CHANGE UP (ref 3.8–10.5)
WBC # FLD AUTO: 9.05 K/UL — SIGNIFICANT CHANGE UP (ref 3.8–10.5)
WBC UR QL: 4 /HPF — SIGNIFICANT CHANGE UP (ref 0–5)

## 2025-01-05 PROCEDURE — 74177 CT ABD & PELVIS W/CONTRAST: CPT | Mod: 26,MC

## 2025-01-05 PROCEDURE — 99285 EMERGENCY DEPT VISIT HI MDM: CPT

## 2025-01-05 RX ORDER — ACETAMINOPHEN 80 MG/.8ML
1000 SOLUTION/ DROPS ORAL ONCE
Refills: 0 | Status: COMPLETED | OUTPATIENT
Start: 2025-01-05 | End: 2025-01-05

## 2025-01-05 RX ADMIN — ACETAMINOPHEN 400 MILLIGRAM(S): 80 SOLUTION/ DROPS ORAL at 23:15

## 2025-01-05 NOTE — ED PROVIDER NOTE - ATTENDING CONTRIBUTION TO CARE
Yasemin Barnhart DO EM Attending  89 year old female with a PMH of bronchiectasis, HTN, hyponatremia as an outpatient, presents to the ER accompanied by son for 1 day of diffuse abdominal pain, multiple episodes of watery nonbloody diarrhea, and dysuria. Denies fevers, vomiting, congestion, cough, hematuria. No recent travel, sick contacts, antibiotic use or hospitalizations.    PHYSICAL EXAM:  CONSTITUTIONAL: Well appearing, awake, alert, oriented to person, place, time/situation and in no apparent distress.  HEAD: Atraumatic  EYES: Clear bilaterally, pupils equal, round and reactive to light.  ENMT: Airway patent, Nasal mucosa clear. Mouth with normal mucosa. Uvula is midline.   CARDIAC: Normal rate, regular rhythm. +S1/S2. No murmurs, rubs or gallops.  RESPIRATORY: Breathing unlabored. Breath sounds clear and equal bilaterally.  ABDOMEN:  Soft, LLQ and LUQ ttp, nondistended. No rebound tenderness or guarding.  NEUROLOGICAL: Alert and oriented, no focal deficits, no motor or sensory deficits.   MSK: No clubbing, cyanosis, or edema. Full range of motion of all extremities.   SKIN: Skin warm and dry. No evidence of rashes or lesions.    Will evaluate for hematologic/electrolyte abnormalities, intraabdominal pathology such as diverticulitis/colitis, uti. Plan to obtain CT AP with IV contrast, labs, UA, pain control, and re-assess.

## 2025-01-05 NOTE — ED PROVIDER NOTE - PATIENT PORTAL LINK FT
You can access the FollowMyHealth Patient Portal offered by [facility] by registering at the following website: [email or web address]. By joining [facility]’s FollowMyHealth portal, you will also be able to view your health information using other applications (apps) compatible with our system.

## 2025-01-05 NOTE — ED ADULT TRIAGE NOTE - NS ED TRIAGE AVPU SCALE
"Requested Prescriptions   Pending Prescriptions Disp Refills     metFORMIN (GLUCOPHAGE) 500 MG tablet  Last Written Prescription Date:  06/04/20  Last Fill Quantity: 12 mL,  # refills: 0   Last office visit: 1/24/2020 with prescribing provider:  LULY 01/24/20   Future Office Visit:           120 tablet 0     Sig: TAKE 1 TABLET BY MOUTH TWICE DAILY WITH MEALS       Biguanide Agents Failed - 8/4/2020 10:33 AM        Failed - Patient has documented A1c within the specified period of time.     If HgbA1C is 8 or greater, it needs to be on file within the past 3 months.  If less than 8, must be on file within the past 6 months.     Recent Labs   Lab Test 09/16/19  0948   A1C 5.8*             Failed - Recent (6 mo) or future (30 days) visit within the authorizing provider's specialty     Patient had office visit in the last 6 months or has a visit in the next 30 days with authorizing provider or within the authorizing provider's specialty.  See \"Patient Info\" tab in inbasket, or \"Choose Columns\" in Meds & Orders section of the refill encounter.            Passed - Patient is age 10 or older        Passed - Patient's CR is NOT>1.4 OR Patient's EGFR is NOT<45 within past 12 mos.     Recent Labs   Lab Test 01/22/20  1356   GFRESTIMATED 46*   GFRESTBLACK 54*       Recent Labs   Lab Test 01/22/20  1356   CR 1.57*             Passed - Patient does NOT have a diagnosis of CHF.        Passed - Medication is active on med list           liraglutide (VICTOZA PEN) 18 MG/3ML solution 12 mL 0     Sig: INJECT 1.2 MG UNDER THE SKIN ONCE DAILY       GLP-1 Agonists Protocol Failed - 8/4/2020 10:33 AM        Failed - HgbA1C in past 3 or 6 months     If HgbA1C is 8 or greater, it needs to be on file within the past 3 months.  If less than 8, must be on file within the past 6 months.     Recent Labs   Lab Test 09/16/19  0948   A1C 5.8*             Failed - Normal serum creatinine on file in past 12 months     Recent Labs   Lab Test " Alert-The patient is alert, awake and responds to voice. The patient is oriented to time, place, and person. The triage nurse is able to obtain subjective information. "01/22/20  1356   CR 1.57*       Ok to refill medication if creatinine is low          Failed - Recent (6 mo) or future (30 days) visit within the authorizing provider's specialty     Patient had office visit in the last 6 months or has a visit in the next 30 days with authorizing provider.  See \"Patient Info\" tab in inbasket, or \"Choose Columns\" in Meds & Orders section of the refill encounter.            Passed - Medication is active on med list        Passed - Patient is age 18 or older             "

## 2025-01-05 NOTE — ED PROVIDER NOTE - PROGRESS NOTE DETAILS
Pt reassessed. Pt has not had a bowel movement/diarrhea in the department so will cancel stool cultures. Pt endorsing reduced pain. Labs nonactionable. UA clean. CT without any evidence of acute intraabdominal pathology. Likely enterocolitis. Pt is afebrile currently, vitals are stable. Pt was educated on outpatient treatment, follow up and return precautions. Shared decision making performed. Pt okay for DC home at this time. Questions answered. Pt understands and agrees with the plan for discharge home. Pt has access to appropriate follow up.   Larissa Pérez PGY1

## 2025-01-05 NOTE — ED ADULT NURSE NOTE - OBJECTIVE STATEMENT
88 y/o female complaining of abd pain. Pt is A&Ox4, breathing spontaneously speaking in full sentences with son at bedside to translate, with pmh of Htn, Depression, presents to the Ed for abd pain that started today. PT endorsing pain on the left side of her abdomen, diarrhea and burning on urination which is new. Pt deneis chest pain, sob, dizziness, recent travel or sick contacts, denies N,V. Pt lung sounds clear and equal bilat abd soft tender on the left upper and lower quadrants, nontender on the right side, nondistended x4 quadrants, negative for peripheral edema. Pt placed in patient gown bed in lowest position to maintain safety.

## 2025-01-05 NOTE — ED PROVIDER NOTE - ATTENDING WITH...
"Patient Name:  Jesus Shelley  MRN:  [id number]    Assessment & Plan     Right knee severe posttraumatic DJD  Patient was offered and accepted a right knee intra-articular cortisone injection today.  Prescription for Tylenol and Voltaren gel provided today.  Advised against oral NSAIDs due to CKD.  Patient feels her medial  brace was the cause of her increased pain.  Advise she may discontinue this until her pain improves.  Activities as tolerated with modification avoid pain.  Follow-up in 2 months with Dr. Rodriguez.    Chief Complaint     Right knee pain    History of the Present Illness     Jesus Shelley is a 67 y.o. female who reports to the office today for evaluation of her right knee.  Patient does have a known history of right knee severe posttraumatic DJD.  She has been seeing Dr. Rodriguez for this issue and has been opting for conservative management consisting of medial  bracing and activity modification.  She has declined an injection up to this point.  Patient recently received her medial  brace and begin wearing it.  After she began wearing it she noted increased pain over the past few days.  She denies any recent injury or trauma.  She notes generalized severe pain and swelling with stiffness.  She states her pain is 10 out of 10 in intensity and worse with all activity.  She has been taking Tylenol and ibuprofen intermittently with some improvement.  She denies any instability or weakness.  No numbness or tingling.  No fevers or chills.    Physical Exam     /50 (BP Location: Left arm, Patient Position: Sitting, Cuff Size: Standard)   Ht 4' 11\" (1.499 m)   Wt 70.8 kg (156 lb)   BMI 31.51 kg/m²     Right knee: Varus deformity evident.  Scar from prior surgery evident as well.  No open wounds or drainage.  No erythema ecchymosis or swelling.  Small effusion. Diffuse TTP.  Range of motion is extension -10, flexion 80 with pain. Slight laxity with valgus stress. Extensor " mechanism intact. Sensation intact distally. Skin warm and well perfused distally.     Eyes: Anicteric sclerae.  ENT: Trachea midline.  Lungs: Normal respiratory effort.  CV: Capillary refill is less than 2 seconds.  Skin: Intact without erythema.  Lymph: No palpable lymphadenopathy.  Neuro: Sensation is grossly intact to light touch.  Psych: Mood and affect are appropriate.    Data Review     I have personally reviewed pertinent films in PACS, and my interpretation follows:    X-rays right knee 11/14/24: No acute osseous abnormality.  No fracture or dislocation.  Severe tricompartmental degenerative changes and associated orthopedic hardware without evidence of failure or complication.  No interval change compared to prior radiographs performed on 7/30/2024.    History reviewed. No pertinent past medical history.    Past Surgical History:   Procedure Laterality Date    HYMENECTOMY  09/2023    KNEE SURGERY  1976       No Known Allergies    Current Outpatient Medications on File Prior to Visit   Medication Sig Dispense Refill    alendronate (Fosamax) 70 mg tablet Take 1 tablet (70 mg total) by mouth every 7 days 12 tablet 3    amLODIPine (NORVASC) 2.5 mg tablet Take 1 tablet (2.5 mg total) by mouth daily 100 tablet 3    atenolol-chlorthalidone (TENORETIC) 100-25 mg per tablet Take 1 tablet by mouth daily 30 tablet 5    benzonatate (TESSALON PERLES) 100 mg capsule Take 1 capsule (100 mg total) by mouth 3 (three) times a day as needed for cough 20 capsule 0    Calcium Carb-Cholecalciferol (calcium carbonate-vitamin D) 500 mg-5 mcg tablet Take 1 tablet by mouth 2 (two) times a day with meals 180 tablet 3    calcium carbonate-vitamin D 500 mg-5 mcg per tablet Take 1 tablet by mouth 2 (two) times a day with meals 180 tablet 3    rosuvastatin (CRESTOR) 10 MG tablet Take 1 tablet (10 mg total) by mouth daily 100 tablet 3    valsartan (DIOVAN) 80 mg tablet Take 1 tablet (80 mg total) by mouth daily 100 tablet 3     No  current facility-administered medications on file prior to visit.       Social History     Tobacco Use    Smoking status: Never    Smokeless tobacco: Never       History reviewed. No pertinent family history.    Review of Systems     As stated in the HPI. All other systems reviewed and are negative.      Large joint arthrocentesis: R knee  Procedure Details  Location: knee - R knee  Needle size: 22 G  Ultrasound guidance: no  Approach: anterolateral  Medications administered: 1 mL methylPREDNISolone acetate 40 mg/mL; 4 mL ropivacaine 0.2 %    Patient tolerance: patient tolerated the procedure well with no immediate complications  Dressing:  Sterile dressing applied           Resident

## 2025-01-06 VITALS
SYSTOLIC BLOOD PRESSURE: 183 MMHG | DIASTOLIC BLOOD PRESSURE: 60 MMHG | HEART RATE: 62 BPM | OXYGEN SATURATION: 96 % | RESPIRATION RATE: 18 BRPM

## 2025-01-06 LAB
FLUAV AG NPH QL: SIGNIFICANT CHANGE UP
FLUBV AG NPH QL: SIGNIFICANT CHANGE UP
RSV RNA NPH QL NAA+NON-PROBE: SIGNIFICANT CHANGE UP
SARS-COV-2 RNA SPEC QL NAA+PROBE: SIGNIFICANT CHANGE UP

## 2025-01-06 PROCEDURE — 87637 SARSCOV2&INF A&B&RSV AMP PRB: CPT

## 2025-01-06 PROCEDURE — 87086 URINE CULTURE/COLONY COUNT: CPT

## 2025-01-06 PROCEDURE — 83690 ASSAY OF LIPASE: CPT

## 2025-01-06 PROCEDURE — 96374 THER/PROPH/DIAG INJ IV PUSH: CPT | Mod: XU

## 2025-01-06 PROCEDURE — 85025 COMPLETE CBC W/AUTO DIFF WBC: CPT

## 2025-01-06 PROCEDURE — 83735 ASSAY OF MAGNESIUM: CPT

## 2025-01-06 PROCEDURE — 74177 CT ABD & PELVIS W/CONTRAST: CPT | Mod: MC

## 2025-01-06 PROCEDURE — 80053 COMPREHEN METABOLIC PANEL: CPT

## 2025-01-06 PROCEDURE — 93005 ELECTROCARDIOGRAM TRACING: CPT

## 2025-01-06 PROCEDURE — 99285 EMERGENCY DEPT VISIT HI MDM: CPT | Mod: 25

## 2025-01-06 PROCEDURE — 84100 ASSAY OF PHOSPHORUS: CPT

## 2025-01-06 PROCEDURE — 81001 URINALYSIS AUTO W/SCOPE: CPT

## 2025-01-07 LAB
CULTURE RESULTS: SIGNIFICANT CHANGE UP
SPECIMEN SOURCE: SIGNIFICANT CHANGE UP

## 2025-04-15 ENCOUNTER — APPOINTMENT (OUTPATIENT)
Dept: PODIATRY | Facility: CLINIC | Age: 89
End: 2025-04-15
Payer: MEDICARE

## 2025-04-15 DIAGNOSIS — Z87.09 PERSONAL HISTORY OF OTHER DISEASES OF THE RESPIRATORY SYSTEM: ICD-10-CM

## 2025-04-15 DIAGNOSIS — L97.519 NON-PRESSURE CHRONIC ULCER OF OTHER PART OF RIGHT FOOT WITH UNSPECIFIED SEVERITY: ICD-10-CM

## 2025-04-15 DIAGNOSIS — B35.1 TINEA UNGUIUM: ICD-10-CM

## 2025-04-15 DIAGNOSIS — Z86.59 PERSONAL HISTORY OF OTHER MENTAL AND BEHAVIORAL DISORDERS: ICD-10-CM

## 2025-04-15 DIAGNOSIS — M79.674 PAIN IN RIGHT TOE(S): ICD-10-CM

## 2025-04-15 DIAGNOSIS — M79.675 PAIN IN RIGHT TOE(S): ICD-10-CM

## 2025-04-15 DIAGNOSIS — M79.89 OTHER SPECIFIED SOFT TISSUE DISORDERS: ICD-10-CM

## 2025-04-15 DIAGNOSIS — R22.41 LOCALIZED SWELLING, MASS AND LUMP, RIGHT LOWER LIMB: ICD-10-CM

## 2025-04-15 DIAGNOSIS — Z86.69 PERSONAL HISTORY OF OTHER DISEASES OF THE NERVOUS SYSTEM AND SENSE ORGANS: ICD-10-CM

## 2025-04-15 DIAGNOSIS — Z86.79 PERSONAL HISTORY OF OTHER DISEASES OF THE CIRCULATORY SYSTEM: ICD-10-CM

## 2025-04-15 DIAGNOSIS — L81.9 DISORDER OF PIGMENTATION, UNSPECIFIED: ICD-10-CM

## 2025-04-15 PROCEDURE — 99204 OFFICE O/P NEW MOD 45 MIN: CPT | Mod: 25

## 2025-04-15 PROCEDURE — 11721 DEBRIDE NAIL 6 OR MORE: CPT | Mod: 59

## 2025-04-15 PROCEDURE — 11301 SHAVE SKIN LESION 0.6-1.0 CM: CPT | Mod: RT,59

## 2025-04-15 PROCEDURE — 11104 PUNCH BX SKIN SINGLE LESION: CPT | Mod: 59,RT

## 2025-04-15 PROCEDURE — 73630 X-RAY EXAM OF FOOT: CPT | Mod: RT

## 2025-04-15 RX ORDER — KETOCONAZOLE 20 MG/G
2 CREAM TOPICAL TWICE DAILY
Qty: 1 | Refills: 3 | Status: ACTIVE | COMMUNITY
Start: 2025-04-15 | End: 1900-01-01

## 2025-04-15 RX ORDER — MUPIROCIN 20 MG/G
2 OINTMENT TOPICAL
Qty: 1 | Refills: 3 | Status: DISCONTINUED | COMMUNITY
Start: 2025-04-15 | End: 2025-04-15

## 2025-04-15 RX ORDER — MUPIROCIN 20 MG/G
2 OINTMENT TOPICAL
Qty: 1 | Refills: 3 | Status: ACTIVE | COMMUNITY
Start: 2025-04-15 | End: 1900-01-01

## 2025-04-17 PROBLEM — R22.41: Status: ACTIVE | Noted: 2025-04-15

## 2025-04-20 PROBLEM — M79.674 PAIN IN TOES OF BOTH FEET: Status: ACTIVE | Noted: 2025-04-17

## 2025-04-20 PROBLEM — M79.89 MASS OF SOFT TISSUE OF FOOT: Status: ACTIVE | Noted: 2025-04-17

## 2025-04-22 LAB — CORE LAB BIOPSY: NORMAL

## 2025-05-13 ENCOUNTER — APPOINTMENT (OUTPATIENT)
Dept: PODIATRY | Facility: CLINIC | Age: 89
End: 2025-05-13

## 2025-05-13 DIAGNOSIS — L81.9 DISORDER OF PIGMENTATION, UNSPECIFIED: ICD-10-CM

## 2025-05-13 DIAGNOSIS — D22.9 MELANOCYTIC NEVI, UNSPECIFIED: ICD-10-CM

## 2025-05-13 DIAGNOSIS — C44.91 BASAL CELL CARCINOMA OF SKIN, UNSPECIFIED: ICD-10-CM

## 2025-05-13 PROCEDURE — 99213 OFFICE O/P EST LOW 20 MIN: CPT

## 2025-05-14 NOTE — ED ADULT NURSE NOTE - CHPI ED NUR DURATION
----- Message from Dr. Eliel Warner MD sent at 5/14/2025 10:04 AM EDT -----  Left breast nodule needs furhter imaging  Diagnostic left mammogram and left breast ultrasound needed  ----- Message -----  From: Karen Keating  Sent: 5/13/2025   4:23 PM EDT  To: Eliel Warner MD    Can mammogram wait for Dr PALMA?   today

## 2025-05-18 PROBLEM — D22.9 ACRAL COMPOUND NEVUS: Status: ACTIVE | Noted: 2025-05-18

## 2025-06-02 ENCOUNTER — NON-APPOINTMENT (OUTPATIENT)
Age: 89
End: 2025-06-02

## 2025-06-05 ENCOUNTER — NON-APPOINTMENT (OUTPATIENT)
Age: 89
End: 2025-06-05

## 2025-06-05 ENCOUNTER — APPOINTMENT (OUTPATIENT)
Dept: SURGICAL ONCOLOGY | Facility: CLINIC | Age: 89
End: 2025-06-05
Payer: MEDICARE

## 2025-06-05 VITALS
WEIGHT: 125 LBS | HEIGHT: 60 IN | SYSTOLIC BLOOD PRESSURE: 171 MMHG | DIASTOLIC BLOOD PRESSURE: 67 MMHG | RESPIRATION RATE: 16 BRPM | BODY MASS INDEX: 24.54 KG/M2 | OXYGEN SATURATION: 96 % | HEART RATE: 74 BPM

## 2025-06-05 DIAGNOSIS — R22.41 LOCALIZED SWELLING, MASS AND LUMP, RIGHT LOWER LIMB: ICD-10-CM

## 2025-06-05 PROCEDURE — 99205 OFFICE O/P NEW HI 60 MIN: CPT

## 2025-06-20 ENCOUNTER — APPOINTMENT (OUTPATIENT)
Dept: MRI IMAGING | Facility: CLINIC | Age: 89
End: 2025-06-20

## 2025-06-20 PROCEDURE — 73720 MRI LWR EXTREMITY W/O&W/DYE: CPT | Mod: RT

## 2025-06-20 PROCEDURE — A9585: CPT | Mod: JW

## 2025-07-10 ENCOUNTER — APPOINTMENT (OUTPATIENT)
Dept: PLASTIC SURGERY | Facility: CLINIC | Age: 89
End: 2025-07-10

## 2025-07-29 ENCOUNTER — APPOINTMENT (OUTPATIENT)
Dept: SURGICAL ONCOLOGY | Facility: HOSPITAL | Age: 89
End: 2025-07-29